# Patient Record
Sex: FEMALE | Race: WHITE | Employment: OTHER | URBAN - METROPOLITAN AREA
[De-identification: names, ages, dates, MRNs, and addresses within clinical notes are randomized per-mention and may not be internally consistent; named-entity substitution may affect disease eponyms.]

---

## 2018-01-17 ENCOUNTER — LAB ENCOUNTER (OUTPATIENT)
Dept: LAB | Facility: HOSPITAL | Age: 53
End: 2018-01-17
Attending: INTERNAL MEDICINE
Payer: COMMERCIAL

## 2018-01-17 DIAGNOSIS — I10 ESSENTIAL HYPERTENSION, MALIGNANT: Primary | ICD-10-CM

## 2018-01-17 LAB
ANION GAP SERPL CALC-SCNC: 8 MMOL/L (ref 0–18)
BUN SERPL-MCNC: 14 MG/DL (ref 8–20)
BUN/CREAT SERPL: 18.2 (ref 10–20)
CALCIUM SERPL-MCNC: 9.2 MG/DL (ref 8.5–10.5)
CHLORIDE SERPL-SCNC: 102 MMOL/L (ref 95–110)
CO2 SERPL-SCNC: 27 MMOL/L (ref 22–32)
CREAT SERPL-MCNC: 0.77 MG/DL (ref 0.5–1.5)
GLUCOSE SERPL-MCNC: 100 MG/DL (ref 70–99)
OSMOLALITY UR CALC.SUM OF ELEC: 285 MOSM/KG (ref 275–295)
POTASSIUM SERPL-SCNC: 4.2 MMOL/L (ref 3.3–5.1)
SODIUM SERPL-SCNC: 137 MMOL/L (ref 136–144)

## 2018-01-17 PROCEDURE — 36415 COLL VENOUS BLD VENIPUNCTURE: CPT

## 2018-01-17 PROCEDURE — 80048 BASIC METABOLIC PNL TOTAL CA: CPT

## 2018-06-21 ENCOUNTER — LAB ENCOUNTER (OUTPATIENT)
Dept: LAB | Facility: HOSPITAL | Age: 53
End: 2018-06-21
Attending: ANESTHESIOLOGY
Payer: COMMERCIAL

## 2018-06-21 DIAGNOSIS — E03.8 TSH (THYROID-STIMULATING HORMONE DEFICIENCY): Primary | ICD-10-CM

## 2018-06-21 DIAGNOSIS — I10 HIGH BLOOD PRESSURE: ICD-10-CM

## 2018-06-21 PROCEDURE — 80053 COMPREHEN METABOLIC PANEL: CPT

## 2018-06-21 PROCEDURE — 84480 ASSAY TRIIODOTHYRONINE (T3): CPT

## 2018-06-21 PROCEDURE — 84443 ASSAY THYROID STIM HORMONE: CPT

## 2018-06-21 PROCEDURE — 80061 LIPID PANEL: CPT

## 2018-07-26 RX ORDER — CLONIDINE HYDROCHLORIDE 0.1 MG/1
TABLET ORAL
Qty: 30 TABLET | Refills: 0 | Status: ON HOLD | OUTPATIENT
Start: 2018-07-26 | End: 2019-04-07

## 2018-10-04 RX ORDER — DILTIAZEM HYDROCHLORIDE 180 MG/1
CAPSULE, EXTENDED RELEASE ORAL
Qty: 60 CAPSULE | Refills: 3 | Status: ON HOLD | OUTPATIENT
Start: 2018-10-04 | End: 2021-05-02

## 2018-10-15 RX ORDER — PANCRELIPASE 24000; 76000; 120000 [USP'U]/1; [USP'U]/1; [USP'U]/1
CAPSULE, DELAYED RELEASE PELLETS ORAL
Refills: 2 | OUTPATIENT
Start: 2018-10-15

## 2018-10-15 NOTE — TELEPHONE ENCOUNTER
Pt has not seen Dr. Tanesha Neff since 2015. Will need to call office for apt before any refills can be given.

## 2018-10-18 ENCOUNTER — OFFICE VISIT (OUTPATIENT)
Dept: OPTOMETRY | Facility: CLINIC | Age: 53
End: 2018-10-18
Payer: COMMERCIAL

## 2018-10-18 DIAGNOSIS — H52.13 MYOPIA OF BOTH EYES: ICD-10-CM

## 2018-10-18 DIAGNOSIS — E11.9 CONTROLLED TYPE 2 DIABETES MELLITUS WITHOUT COMPLICATION, WITHOUT LONG-TERM CURRENT USE OF INSULIN (HCC): Primary | ICD-10-CM

## 2018-10-18 PROCEDURE — 92015 DETERMINE REFRACTIVE STATE: CPT | Performed by: OPTOMETRIST

## 2018-10-18 PROCEDURE — 92004 COMPRE OPH EXAM NEW PT 1/>: CPT | Performed by: OPTOMETRIST

## 2018-10-18 NOTE — ASSESSMENT & PLAN NOTE
New glasses and contact lens RX given today. Patient knows that she can come back if she feels her vision hs changed due to blood sugar fluctuation.

## 2018-10-18 NOTE — PROGRESS NOTES
Leopoldo Carrera is a 48year old female. HPI:     HPI     Diabetic Eye Exam     Diabetes characteristics include controlled with diet, Type 2 and taking oral medications. Duration of 4 years. Blood sugar level fluctuates.   Number of years diabetic 4 CAPSULES BY MOUTH IN THE MORNING  Disp: 60 capsule Rfl: 3   Glucose Blood In Vitro Strip by Misc. (Non-Drug; Combo Route) route. Pt test bs bid Disp:  Rfl:    Pancrelipase, Lip-Prot-Amyl, 29420 UNITS Oral Cap DR Particles Take by mouth.  Disp:  Rfl:    CloNI Normal          Keratometry       K1 K2    Right 46.25 45.25    Left 46.50 45.25            Slit Lamp and Fundus Exam     External Exam       Right Left    External Normal Normal          Slit Lamp Exam       Right Left    Lids/Lashes Normal Normal    Conj Prescriptions      No prescriptions requested or ordered in this encounter        Follow up instructions:  No Follow-up on file.     10/18/2018  Scribed by: Terell Joseph

## 2018-10-18 NOTE — PROGRESS NOTES
Shelley Reis is a 48year old female. HPI:     HPI     Diabetic Eye Exam     Diabetes characteristics include controlled with diet, Type 2 and taking oral medications. Duration of 4 years. Blood sugar level fluctuates.   Number of years diabetic 4 CAPSULES BY MOUTH IN THE MORNING  Disp: 60 capsule Rfl: 3   Glucose Blood In Vitro Strip by Misc. (Non-Drug; Combo Route) route. Pt test bs bid Disp:  Rfl:    Pancrelipase, Lip-Prot-Amyl, 12032 UNITS Oral Cap DR Particles Take by mouth.  Disp:  Rfl:    CloNI Normal          Keratometry       K1 K2    Right 46.25 45.25    Left 46.50 45.25            Slit Lamp and Fundus Exam     External Exam       Right Left    External Normal Normal          Slit Lamp Exam       Right Left    Lids/Lashes Normal Normal    Conj diabetes mellitus without complication, without long-term current use of insulin ([address]  I advised patient that there is no background diabetic retinopathy in either eye and that they should continue to keep their blood sugar under control and continue to s

## 2018-10-18 NOTE — PATIENT INSTRUCTIONS
Myopia  New glasses and contact lens RX given today. Patient knows that she can come back if she feels her vision hs changed due to blood sugar fluctuation.     Controlled type 2 diabetes mellitus without complication, without long-term current use of ins

## 2018-11-12 ENCOUNTER — LAB ENCOUNTER (OUTPATIENT)
Dept: LAB | Facility: HOSPITAL | Age: 53
End: 2018-11-12
Attending: ANESTHESIOLOGY
Payer: COMMERCIAL

## 2018-11-12 DIAGNOSIS — E11.9 DIABETES (HCC): Primary | ICD-10-CM

## 2018-11-12 PROCEDURE — 80061 LIPID PANEL: CPT

## 2018-11-12 PROCEDURE — 36415 COLL VENOUS BLD VENIPUNCTURE: CPT

## 2018-11-12 PROCEDURE — 83036 HEMOGLOBIN GLYCOSYLATED A1C: CPT

## 2018-11-12 PROCEDURE — 82947 ASSAY GLUCOSE BLOOD QUANT: CPT

## 2018-11-12 PROCEDURE — 80051 ELECTROLYTE PANEL: CPT

## 2018-12-26 ENCOUNTER — APPOINTMENT (OUTPATIENT)
Dept: GENERAL RADIOLOGY | Facility: HOSPITAL | Age: 53
End: 2018-12-26
Attending: EMERGENCY MEDICINE
Payer: COMMERCIAL

## 2018-12-26 ENCOUNTER — HOSPITAL ENCOUNTER (OUTPATIENT)
Facility: HOSPITAL | Age: 53
Setting detail: OBSERVATION
Discharge: HOME | End: 2018-12-27
Attending: EMERGENCY MEDICINE | Admitting: HOSPITALIST
Payer: COMMERCIAL

## 2018-12-26 DIAGNOSIS — R07.89 CHEST PAIN, ATYPICAL: Primary | ICD-10-CM

## 2018-12-26 PROCEDURE — 71046 X-RAY EXAM CHEST 2 VIEWS: CPT | Performed by: EMERGENCY MEDICINE

## 2018-12-26 PROCEDURE — 99219 INITIAL OBSERVATION CARE,LEVL II: CPT | Performed by: HOSPITALIST

## 2018-12-26 RX ORDER — OMEPRAZOLE 20 MG/1
20 CAPSULE, DELAYED RELEASE ORAL
COMMUNITY

## 2018-12-26 RX ORDER — ASPIRIN 81 MG/1
324 TABLET, CHEWABLE ORAL ONCE
Status: DISCONTINUED | OUTPATIENT
Start: 2018-12-26 | End: 2018-12-27

## 2018-12-26 RX ORDER — DULOXETIN HYDROCHLORIDE 60 MG/1
60 CAPSULE, DELAYED RELEASE ORAL NIGHTLY
COMMUNITY
End: 2020-08-05

## 2018-12-27 ENCOUNTER — APPOINTMENT (OUTPATIENT)
Dept: CV DIAGNOSTICS | Facility: HOSPITAL | Age: 53
End: 2018-12-27
Attending: HOSPITALIST
Payer: COMMERCIAL

## 2018-12-27 ENCOUNTER — APPOINTMENT (OUTPATIENT)
Dept: CV DIAGNOSTICS | Facility: HOSPITAL | Age: 53
End: 2018-12-27
Attending: INTERNAL MEDICINE
Payer: COMMERCIAL

## 2018-12-27 ENCOUNTER — APPOINTMENT (OUTPATIENT)
Dept: NUCLEAR MEDICINE | Facility: HOSPITAL | Age: 53
End: 2018-12-27
Attending: HOSPITALIST
Payer: COMMERCIAL

## 2018-12-27 ENCOUNTER — ANESTHESIA EVENT (OUTPATIENT)
Dept: ENDOSCOPY | Facility: HOSPITAL | Age: 53
End: 2018-12-27
Payer: COMMERCIAL

## 2018-12-27 ENCOUNTER — ANESTHESIA (OUTPATIENT)
Dept: ENDOSCOPY | Facility: HOSPITAL | Age: 53
End: 2018-12-27
Payer: COMMERCIAL

## 2018-12-27 VITALS
OXYGEN SATURATION: 96 % | RESPIRATION RATE: 16 BRPM | SYSTOLIC BLOOD PRESSURE: 126 MMHG | BODY MASS INDEX: 22.29 KG/M2 | TEMPERATURE: 98 F | WEIGHT: 142 LBS | DIASTOLIC BLOOD PRESSURE: 80 MMHG | HEART RATE: 60 BPM | HEIGHT: 67 IN

## 2018-12-27 PROCEDURE — 93306 TTE W/DOPPLER COMPLETE: CPT | Performed by: INTERNAL MEDICINE

## 2018-12-27 PROCEDURE — 0DB38ZX EXCISION OF LOWER ESOPHAGUS, VIA NATURAL OR ARTIFICIAL OPENING ENDOSCOPIC, DIAGNOSTIC: ICD-10-PCS | Performed by: INTERNAL MEDICINE

## 2018-12-27 PROCEDURE — 0DB68ZX EXCISION OF STOMACH, VIA NATURAL OR ARTIFICIAL OPENING ENDOSCOPIC, DIAGNOSTIC: ICD-10-PCS | Performed by: INTERNAL MEDICINE

## 2018-12-27 PROCEDURE — 0DB18ZX EXCISION OF UPPER ESOPHAGUS, VIA NATURAL OR ARTIFICIAL OPENING ENDOSCOPIC, DIAGNOSTIC: ICD-10-PCS | Performed by: INTERNAL MEDICINE

## 2018-12-27 PROCEDURE — 99217 OBSERVATION CARE DISCHARGE: CPT | Performed by: HOSPITALIST

## 2018-12-27 PROCEDURE — 93017 CV STRESS TEST TRACING ONLY: CPT | Performed by: HOSPITALIST

## 2018-12-27 PROCEDURE — 78452 HT MUSCLE IMAGE SPECT MULT: CPT | Performed by: HOSPITALIST

## 2018-12-27 PROCEDURE — 0DB98ZX EXCISION OF DUODENUM, VIA NATURAL OR ARTIFICIAL OPENING ENDOSCOPIC, DIAGNOSTIC: ICD-10-PCS | Performed by: INTERNAL MEDICINE

## 2018-12-27 RX ORDER — NALOXONE HYDROCHLORIDE 0.4 MG/ML
80 INJECTION, SOLUTION INTRAMUSCULAR; INTRAVENOUS; SUBCUTANEOUS AS NEEDED
Status: DISCONTINUED | OUTPATIENT
Start: 2018-12-27 | End: 2018-12-27 | Stop reason: HOSPADM

## 2018-12-27 RX ORDER — SODIUM CHLORIDE, SODIUM LACTATE, POTASSIUM CHLORIDE, CALCIUM CHLORIDE 600; 310; 30; 20 MG/100ML; MG/100ML; MG/100ML; MG/100ML
INJECTION, SOLUTION INTRAVENOUS CONTINUOUS
Status: DISCONTINUED | OUTPATIENT
Start: 2018-12-27 | End: 2018-12-27

## 2018-12-27 RX ORDER — MAGNESIUM HYDROXIDE/ALUMINUM HYDROXICE/SIMETHICONE 120; 1200; 1200 MG/30ML; MG/30ML; MG/30ML
30 SUSPENSION ORAL 4 TIMES DAILY PRN
Status: DISCONTINUED | OUTPATIENT
Start: 2018-12-27 | End: 2018-12-27

## 2018-12-27 RX ORDER — ACETAMINOPHEN 325 MG/1
650 TABLET ORAL EVERY 6 HOURS PRN
Status: DISCONTINUED | OUTPATIENT
Start: 2018-12-27 | End: 2018-12-27

## 2018-12-27 RX ORDER — HEPARIN SODIUM 5000 [USP'U]/ML
5000 INJECTION, SOLUTION INTRAVENOUS; SUBCUTANEOUS EVERY 12 HOURS
Status: DISCONTINUED | OUTPATIENT
Start: 2018-12-27 | End: 2018-12-27

## 2018-12-27 RX ORDER — LIDOCAINE HYDROCHLORIDE 10 MG/ML
INJECTION, SOLUTION EPIDURAL; INFILTRATION; INTRACAUDAL; PERINEURAL AS NEEDED
Status: DISCONTINUED | OUTPATIENT
Start: 2018-12-27 | End: 2018-12-27 | Stop reason: SURG

## 2018-12-27 RX ORDER — ONDANSETRON 2 MG/ML
4 INJECTION INTRAMUSCULAR; INTRAVENOUS EVERY 6 HOURS PRN
Status: DISCONTINUED | OUTPATIENT
Start: 2018-12-27 | End: 2018-12-27

## 2018-12-27 RX ORDER — NEBIVOLOL 5 MG/1
10 TABLET ORAL DAILY
Status: DISCONTINUED | OUTPATIENT
Start: 2018-12-27 | End: 2018-12-27

## 2018-12-27 RX ORDER — SODIUM CHLORIDE, SODIUM LACTATE, POTASSIUM CHLORIDE, CALCIUM CHLORIDE 600; 310; 30; 20 MG/100ML; MG/100ML; MG/100ML; MG/100ML
INJECTION, SOLUTION INTRAVENOUS CONTINUOUS PRN
Status: DISCONTINUED | OUTPATIENT
Start: 2018-12-27 | End: 2018-12-27 | Stop reason: SURG

## 2018-12-27 RX ORDER — PANTOPRAZOLE SODIUM 20 MG/1
20 TABLET, DELAYED RELEASE ORAL
Status: DISCONTINUED | OUTPATIENT
Start: 2018-12-27 | End: 2018-12-27

## 2018-12-27 RX ORDER — DIAZEPAM 2 MG/1
2 TABLET ORAL NIGHTLY PRN
Status: DISCONTINUED | OUTPATIENT
Start: 2018-12-27 | End: 2018-12-27

## 2018-12-27 RX ORDER — SODIUM CHLORIDE 9 MG/ML
INJECTION, SOLUTION INTRAVENOUS
Status: COMPLETED
Start: 2018-12-27 | End: 2018-12-27

## 2018-12-27 RX ORDER — DEXTROSE MONOHYDRATE 25 G/50ML
50 INJECTION, SOLUTION INTRAVENOUS
Status: DISCONTINUED | OUTPATIENT
Start: 2018-12-27 | End: 2018-12-27 | Stop reason: HOSPADM

## 2018-12-27 RX ORDER — HYDROCODONE BITARTRATE AND ACETAMINOPHEN 5; 325 MG/1; MG/1
1 TABLET ORAL EVERY 6 HOURS PRN
Status: DISCONTINUED | OUTPATIENT
Start: 2018-12-27 | End: 2018-12-27

## 2018-12-27 RX ORDER — DILTIAZEM HYDROCHLORIDE 180 MG/1
360 CAPSULE, COATED, EXTENDED RELEASE ORAL DAILY
Status: DISCONTINUED | OUTPATIENT
Start: 2018-12-27 | End: 2018-12-27

## 2018-12-27 RX ORDER — DULOXETIN HYDROCHLORIDE 30 MG/1
60 CAPSULE, DELAYED RELEASE ORAL NIGHTLY
Status: DISCONTINUED | OUTPATIENT
Start: 2018-12-27 | End: 2018-12-27

## 2018-12-27 RX ORDER — ONDANSETRON 2 MG/ML
INJECTION INTRAMUSCULAR; INTRAVENOUS AS NEEDED
Status: DISCONTINUED | OUTPATIENT
Start: 2018-12-27 | End: 2018-12-27 | Stop reason: SURG

## 2018-12-27 RX ORDER — CLONIDINE HYDROCHLORIDE 0.2 MG/1
0.2 TABLET ORAL 3 TIMES DAILY
Status: DISCONTINUED | OUTPATIENT
Start: 2018-12-27 | End: 2018-12-27

## 2018-12-27 RX ADMIN — SODIUM CHLORIDE, SODIUM LACTATE, POTASSIUM CHLORIDE, CALCIUM CHLORIDE: 600; 310; 30; 20 INJECTION, SOLUTION INTRAVENOUS at 14:38:00

## 2018-12-27 RX ADMIN — ONDANSETRON 4 MG: 2 INJECTION INTRAMUSCULAR; INTRAVENOUS at 14:47:00

## 2018-12-27 RX ADMIN — SODIUM CHLORIDE, SODIUM LACTATE, POTASSIUM CHLORIDE, CALCIUM CHLORIDE: 600; 310; 30; 20 INJECTION, SOLUTION INTRAVENOUS at 15:03:00

## 2018-12-27 RX ADMIN — LIDOCAINE HYDROCHLORIDE 80 MG: 10 INJECTION, SOLUTION EPIDURAL; INFILTRATION; INTRACAUDAL; PERINEURAL at 14:42:00

## 2018-12-27 NOTE — ANESTHESIA POSTPROCEDURE EVALUATION
Patient: Shine Perry    Procedure Summary     Date:  12/27/18 Room / Location:  [facility] ENDOSCOPY 03 / [facility]    Anesthesia Start:  5792 Anesthesia Stop:      Procedure:  ESOPHAGOGASTRODUODENOSCOPY (EGD) (N/A ) Diagnosis:  (Hiatal hernia, gastric

## 2018-12-27 NOTE — CONSULTS
Gastroenterology consultation note    Reason for consultation:  Noncardiac chest pain, epigastric pain, dysphagia/odynophagia      History of present illness:   The patient is a 48year old female who presents with a several week history of malaise, exercis bacteria per:NG   • Serratia 2005   • Stress fracture of hip 2013    hip pinning   • Type 1 diabetes mellitus (HCC)     Type 1-C           Iodinated Diagnosti*        Comment:Other reaction(s): IODINATED CONTRAST MEDIA - IV             DYE  Penicillins History    Socioeconomic History      Marital status:       Spouse name: Not on file      Number of children: Not on file      Years of education: Not on file      Highest education level: Not on file    Social Needs      Financial resource strain: HPI.        Physical examination:  GEN:  Pleasant well-developed well-nourished female who is in no acute distress. HEENT: Negative for scleral icterus. Conjunctivae are pink.   Neck: Supple without adenopathy or thyromegaly  Chest: Clear to auscultation Biopsies of each area will be obtained. I will also obtain an ESR, CRP and LDH in light of the previous history of lymphoma.   If the endoscopy is negative and the patient's symptoms remain unexplained, a CT scan of the chest, abdomen and pelvis would be

## 2018-12-27 NOTE — H&P
[address] [place] Patient Status:  Emergency    1965 MRN [id number]   Location [address] Attending Sender, Kiko Theodore MD   Hosp Day # 0 PCP Leonela Stockton MD     Date:   Maternal Grandmother    • Genetic Disease Other 5        Neurofibromatosis   • Diabetes Neg       reports that  has never smoked. she has never used smokeless tobacco. She reports that she does not drink alcohol.     Allergies:    Iodinated Diagnosti* Alert and oriented. Diffuse skin problem:  None. Eye:  Pupils are equal, round and reactive to light, extraocular movements are intact, Normal conjunctiva. HENT:  Normocephalic, oral mucosa is moist.  Head:  Normocephalic, atraumatic.   Neck:  Supple, no

## 2018-12-27 NOTE — PLAN OF CARE
Problem: Diabetes/Glucose Control  Goal: Glucose maintained within prescribed range  INTERVENTIONS:  - Monitor Blood Glucose as ordered  - Assess for signs and symptoms of hyperglycemia and hypoglycemia  - Administer ordered medications to maintain glucose effectiveness of antiarrhythmic and heart rate control medications as ordered  - Initiate emergency measures for life threatening arrhythmias  - Monitor electrolytes and administer replacement therapy as ordered  Outcome: Progressing      Problem: METABOLI

## 2018-12-27 NOTE — HISTORICAL OFFICE NOTE
Flori Esparza  : 1965  ACCOUNT:  [id number]  [id number]  PCP: Dr. Gucci Castaneda     TODAY'S DATE: 2018  DICTATED BY:  Sabina Tomlinson MD]      CHIEF COMPLAINT: [Followup of Hypertension.]    HPI:    [On 2018, Anahi Machuca, a 46 year normal rate and rhythm, clear to auscultation. GI: no masses, tenderness or hepatosplenomegaly, rectal deferred. MS: adequate gait for exercise/testing. EXT: no clubbing or cyanosis. SKIN: no rashes, lesions, ulcers.   NEURO/PSYCH: alert and oriented to ti 08/25/1965 02/05/18 11:10:23  ACCOUNT:  [id number]  HOME PHONE:  [phone]  WORK PHONE:     Spoke with patient's  on her behalf. She went back on the clonidine 3 times a day.   She takes this in part because of neuropathic pain from previous cance cm/s with a renal-aortic ratio of 1.1. Renal resistive indices are normal and average less than 0.70. On the left, the kidney is 11.2 cm. There is a widely patent renal artery with a maximum velocity of 70/26 cm/s and a renal-aortic ratio of 0.8.

## 2018-12-27 NOTE — OPERATIVE REPORT
[facility] Endoscopy Report      Date of Procedure:  12/27/18        Preoperative Diagnosis:  1. Cardiac chest pain  2. Epigastric pain  3. Dysphagia/odynophagia      Postoperative Diagnosis:  1.  2 cm hiatal hernia  2.   Gastric fundic g surgical pathology. The duodenal bulb and post bulbar regions were normal.  Biopsies of the second portion of the duodenum were obtained. Impression:  1.  2 cm hiatal hernia  2. Gastric fundic gland polyps    Recommendations:  1.   Follow-up biopsy r

## 2018-12-27 NOTE — H&P
History & Physical Examination    Patient Name: Jai Price  MRN: [id number]  [facility]: [id number]  YOB: 1965    Diagnosis: Non cardiac chest pain, epigastric pain, dysphagia        Medications Prior to Admission:  DULoxetine HCl 60 MG Oral 0.2 mg Oral TID   ondansetron HCl (ZOFRAN) injection 4 mg 4 mg Intravenous Q6H PRN   acetaminophen (TYLENOL) tab 650 mg 650 mg Oral Q6H PRN   Heparin Sodium (Porcine) 5000 UNIT/ML injection 5,000 Units 5,000 Units Subcutaneous Q12H   HYDROcodone-acetaminop Breast Cancer Maternal Grandmother    • Genetic Disease Other 5        Neurofibromatosis   • Diabetes Neg      Social History    Tobacco Use      Smoking status: Never Smoker      Smokeless tobacco: Never Used    Alcohol use: No      SYSTEM Check if Review

## 2018-12-27 NOTE — ED INITIAL ASSESSMENT (HPI)
Chest pressure that radiates to her throat x 1 week intermittently. Pt states last 2 days have been worse. Pt denies sob.

## 2018-12-27 NOTE — ANESTHESIA PREPROCEDURE EVALUATION
Anesthesia PreOp Note    HPI:     Nithya Melara is a 48year old female who presents for preoperative consultation requested by: Yecenia Steele MD    Date of Surgery: 12/26/2018 - 12/27/2018    Procedure(s):  ESOPHAGOGASTRODUODENOSCOPY (EGD)  In mouth nightly as needed. Disp:  Rfl: 2 Taking   CREON 16166 UNITS Oral Cap DR Particles TAKE 3 CAPSULES BY MOUTH 3 (THREE) TIMES DAILY WITH MEALS.  Disp: 810 capsule Rfl: 3 Taking   CloNIDine HCl 0.1 MG Oral Tab DAILY IN THE AFTERNOON Disp: 30 tablet Rfl: Oral Q6H PRN Brice Finch MD    Alum & Mag Hydroxide-Simeth (MAALOX) oral suspension 30 mL 30 mL Oral QID PRN Brice Finch MD    Pancrelipase (Lip-Prot-Amyl) (CREON) 51025-50141 units DR- PATIENT'S OWN SUPPLY  72,000 units of lipase Oral TID CC Pot Exercise: Not Asked        Bike Helmet: Not Asked        Seat Belt: Not Asked        Self-Exams: Not Asked        Grew up on a farm: Not Asked        History of tanning: Not Asked        Outdoor occupation: Not Asked        Pt has a pacemaker: No Anesthesia Plan:   ASA:  2  Plan:   MAC  Informed Consent Plan and Risks Discussed With:  Patient  Discussed plan with:  Surgeon      I have informed Arely Guard and/or legal guardian or family member of the nature of the anesthetic plan, benefi

## 2018-12-27 NOTE — ED PROVIDER NOTES
Patient Seen in: [facility] Emergency Department    History   Patient presents with:  Chest Pain Angina (cardiovascular)    Stated Complaint: Chest pain for a week    HPI    Patient is a 59-year-old female who presents to the emergency department as noted above.     Physical Exam     ED Triage Vitals [12/26/18 1908]   /83   Pulse 52   Resp 18   Temp 99.3 °F (37.4 °C)   Temp src Oral   SpO2 99 %   O2 Device None (Room air)       Current:/83   Pulse (!) 47   Temp 99.3 °F (37.4 °C) (Oral) GREEN   RAINBOW DRAW LIGHT GREEN   RAINBOW DRAW GOLD   RAINBOW DRAW LAVENDER TALL (BNP)   CBC W/ DIFFERENTIAL     EKG    Rate, intervals and axes as noted on EKG Report.   Rate: 49  Rhythm: Sinus Rhythm  Reading: No acute changes                    MDM   Si

## 2018-12-27 NOTE — CONSULTS
[facility] HOSP - [facility]    Report of Cardiology Consultation    Henry Ayala Patient Status:  Emergency    1965 MRN [id number]   Location [address] Attending Sender, Sammy Galvez MD   Hosp Day # 0 PCP María Temple MD Family History  Family History   Problem Relation Age of Onset   • Glaucoma Mother    • Hypertension Mother    • Heart Disease Father         CAD   • Hypertension Father    • Other (Other) Father         polymyalgia rheumatic   • Breast Cancer Matern CREATSERUM 0.90 12/26/2018    BUN 15 12/26/2018     12/26/2018    K 3.7 12/26/2018    CL 99 12/26/2018    CO2 28 12/26/2018     (H) 12/26/2018    CA 9.3 12/26/2018    ALB 4.0 06/21/2018    ALKPHO 93 06/21/2018    TP 7.4 06/21/2018    AST 22 06

## 2018-12-27 NOTE — PROGRESS NOTES
[facility]    Progress Note    [facility] Patient Status:  Observation    1965 MRN [id number]   Location [facility] 3W/SW Attending Jevon Ruff MD   Hosp Day # 0 PCP Arvin Gutierrez MD       Assessment and Plan: effort  CV: Heart with regular rate and rhythm, nl S1,S2 ,no murmur  Abd: Abdomen soft, nontender, nondistended,   Ext:  no clubbing, no cyanosis,no edema  Neuro: no focal deficits  Skin: no rashes or lesions    Scheduled Meds:   • Nebivolol HCl  10 mg Tenna Job

## 2018-12-28 NOTE — PLAN OF CARE
- pt leaving for echo now  - ok for DC after echo is completed  - will f/u on results tomorrow 12/29/18  - plan d/w Dr. Roopa Chacon and Dr. Jose Schultz  - d/w patient        COSTA Iverson  S Cardiology  12/27/18

## 2018-12-28 NOTE — TRANSITION NOTE
For Cardiology Office: This is a patient of Dr. Phyllis Horne. She was seen by Dr. Pao Butcher on 12/26 for chest pain. She has a history of hypertension. Troponins were negative. Nuclear stress test showed normal perfusion and an ejection fraction of 84%.   An e CREON      Take by mouth. Refills:  0     CREON 10739-85083 units Cpep  Generic drug:  Pancrelipase (Lip-Prot-Amyl)      TAKE 3 CAPSULES BY MOUTH 3 (THREE) TIMES DAILY WITH MEALS.    Quantity:  810 capsule  Refills:  3     diazepam 2 MG Tabs  Commonly kno

## 2019-01-07 ENCOUNTER — MED REC SCAN ONLY (OUTPATIENT)
Dept: GASTROENTEROLOGY | Facility: CLINIC | Age: 54
End: 2019-01-07

## 2019-01-07 NOTE — DISCHARGE SUMMARY
[facility] HOSPITALIST  DISCHARGE SUMMARY     Silvina Carlos Patient Status:  Observation    1965 MRN [id number]   [facility] 3W/ Attending No att. providers found   Hosp Day # 0 PCP Enedina Cabrera MD     Date of Admission:  Her total cholesterol was 254 triglycerides 104, HDL 58 and LDL was 175. She states that she has been on a statin in the past and it caused muscle aches. Defer to cardiology/follow up as outpatient. She also has diabetes and a hemoglobin A1c of 6.2.  Cristal 30 tablet  Refills:  0     Pancrelipase (Lip-Prot-Amyl) 35104-34791 units Cpep  Commonly known as:  CREON      Take by mouth.    Refills:  0     CREON 73798-54657 units Cpep  Generic drug:  Pancrelipase (Lip-Prot-Amyl)      TAKE 3 CAPSULES BY MOUTH 3 (THRE Risk  0-28   Low Risk. TCM Follow-Up Recommendation:  LACE 29-58:  Moderate Risk of readmission after discharge from the hospital.        Philip Joseph MD 1/7/2019    Time spent:  > 30 minutes

## 2019-03-02 ENCOUNTER — LAB ENCOUNTER (OUTPATIENT)
Dept: LAB | Facility: HOSPITAL | Age: 54
End: 2019-03-02
Attending: INTERNAL MEDICINE
Payer: COMMERCIAL

## 2019-03-02 DIAGNOSIS — E10.9 DIABETES MELLITUS TYPE I (HCC): Primary | ICD-10-CM

## 2019-03-02 LAB
EST. AVERAGE GLUCOSE BLD GHB EST-MCNC: 137 MG/DL (ref 68–126)
GLUCOSE BLD-MCNC: 115 MG/DL (ref 70–99)
HBA1C MFR BLD HPLC: 6.4 % (ref ?–5.7)

## 2019-03-02 PROCEDURE — 83036 HEMOGLOBIN GLYCOSYLATED A1C: CPT

## 2019-03-02 PROCEDURE — 82947 ASSAY GLUCOSE BLOOD QUANT: CPT

## 2019-03-02 PROCEDURE — 36415 COLL VENOUS BLD VENIPUNCTURE: CPT

## 2019-03-02 PROCEDURE — 84681 ASSAY OF C-PEPTIDE: CPT

## 2019-03-04 LAB — C-PEPTIDE, SERUM OR PLASMA: 1.6 NG/ML

## 2019-04-05 ENCOUNTER — HOSPITAL ENCOUNTER (EMERGENCY)
Facility: HOSPITAL | Age: 54
Discharge: STILL ADMITTED OR ADMINISTRATIVE | End: 2019-04-06
Payer: COMMERCIAL

## 2019-04-07 ENCOUNTER — APPOINTMENT (OUTPATIENT)
Dept: NUCLEAR MEDICINE | Facility: HOSPITAL | Age: 54
End: 2019-04-07
Attending: EMERGENCY MEDICINE
Payer: COMMERCIAL

## 2019-04-07 ENCOUNTER — HOSPITAL ENCOUNTER (OUTPATIENT)
Facility: HOSPITAL | Age: 54
Setting detail: OBSERVATION
Discharge: HOME | End: 2019-04-09
Attending: EMERGENCY MEDICINE
Payer: COMMERCIAL

## 2019-04-07 ENCOUNTER — APPOINTMENT (OUTPATIENT)
Dept: GENERAL RADIOLOGY | Facility: HOSPITAL | Age: 54
End: 2019-04-07
Attending: EMERGENCY MEDICINE
Payer: COMMERCIAL

## 2019-04-07 DIAGNOSIS — R07.9 ACUTE CHEST PAIN: ICD-10-CM

## 2019-04-07 DIAGNOSIS — R23.0 PERIORAL CYANOSIS: ICD-10-CM

## 2019-04-07 DIAGNOSIS — R06.00 DYSPNEA ON EXERTION: Primary | ICD-10-CM

## 2019-04-07 PROBLEM — R06.09 DYSPNEA ON EXERTION: Status: ACTIVE | Noted: 2019-04-07

## 2019-04-07 PROBLEM — E87.3 METABOLIC ALKALOSIS: Status: ACTIVE | Noted: 2019-04-07

## 2019-04-07 PROBLEM — E87.6 HYPOKALEMIA: Status: ACTIVE | Noted: 2019-04-07

## 2019-04-07 PROCEDURE — 99220 INITIAL OBSERVATION CARE,LEVL III: CPT | Performed by: HOSPITALIST

## 2019-04-07 PROCEDURE — 99214 OFFICE O/P EST MOD 30 MIN: CPT | Performed by: INTERNAL MEDICINE

## 2019-04-07 PROCEDURE — 78582 LUNG VENTILAT&PERFUS IMAGING: CPT | Performed by: EMERGENCY MEDICINE

## 2019-04-07 PROCEDURE — 71045 X-RAY EXAM CHEST 1 VIEW: CPT | Performed by: EMERGENCY MEDICINE

## 2019-04-07 RX ORDER — OLMESARTAN MEDOXOMIL 40 MG/1
40 TABLET ORAL DAILY
COMMUNITY
End: 2019-10-08

## 2019-04-07 RX ORDER — LOSARTAN POTASSIUM 100 MG/1
100 TABLET ORAL DAILY
Status: DISCONTINUED | OUTPATIENT
Start: 2019-04-07 | End: 2019-04-09

## 2019-04-07 RX ORDER — METOPROLOL TARTRATE 50 MG/1
100 TABLET, FILM COATED ORAL ONCE
Status: COMPLETED | OUTPATIENT
Start: 2019-04-08 | End: 2019-04-08

## 2019-04-07 RX ORDER — EZETIMIBE 10 MG/1
10 TABLET ORAL DAILY
COMMUNITY
End: 2019-12-16 | Stop reason: ALTCHOICE

## 2019-04-07 RX ORDER — METOCLOPRAMIDE HYDROCHLORIDE 5 MG/ML
10 INJECTION INTRAMUSCULAR; INTRAVENOUS EVERY 8 HOURS PRN
Status: DISCONTINUED | OUTPATIENT
Start: 2019-04-07 | End: 2019-04-09

## 2019-04-07 RX ORDER — SODIUM CHLORIDE 0.9 % (FLUSH) 0.9 %
3 SYRINGE (ML) INJECTION AS NEEDED
Status: DISCONTINUED | OUTPATIENT
Start: 2019-04-07 | End: 2019-04-09

## 2019-04-07 RX ORDER — METOPROLOL TARTRATE 50 MG/1
100 TABLET, FILM COATED ORAL ONCE AS NEEDED
Status: DISCONTINUED | OUTPATIENT
Start: 2019-04-07 | End: 2019-04-09

## 2019-04-07 RX ORDER — ASPIRIN 81 MG/1
324 TABLET, CHEWABLE ORAL ONCE
Status: COMPLETED | OUTPATIENT
Start: 2019-04-07 | End: 2019-04-07

## 2019-04-07 RX ORDER — METOPROLOL TARTRATE 50 MG/1
100 TABLET, FILM COATED ORAL ONCE
Status: COMPLETED | OUTPATIENT
Start: 2019-04-07 | End: 2019-04-07

## 2019-04-07 RX ORDER — PANTOPRAZOLE SODIUM 40 MG/1
40 TABLET, DELAYED RELEASE ORAL
Status: DISCONTINUED | OUTPATIENT
Start: 2019-04-08 | End: 2019-04-09

## 2019-04-07 RX ORDER — METOPROLOL TARTRATE 50 MG/1
50 TABLET, FILM COATED ORAL ONCE AS NEEDED
Status: ACTIVE | OUTPATIENT
Start: 2019-04-07 | End: 2019-04-07

## 2019-04-07 RX ORDER — METOPROLOL TARTRATE 50 MG/1
50 TABLET, FILM COATED ORAL ONCE AS NEEDED
Status: DISCONTINUED | OUTPATIENT
Start: 2019-04-07 | End: 2019-04-09

## 2019-04-07 RX ORDER — DEXTROSE MONOHYDRATE 25 G/50ML
50 INJECTION, SOLUTION INTRAVENOUS AS NEEDED
Status: DISCONTINUED | OUTPATIENT
Start: 2019-04-07 | End: 2019-04-07

## 2019-04-07 RX ORDER — METOPROLOL TARTRATE 50 MG/1
50 TABLET, FILM COATED ORAL ONCE AS NEEDED
Status: COMPLETED | OUTPATIENT
Start: 2019-04-08 | End: 2019-04-08

## 2019-04-07 RX ORDER — DILTIAZEM HYDROCHLORIDE 180 MG/1
360 CAPSULE, EXTENDED RELEASE ORAL DAILY
Status: DISCONTINUED | OUTPATIENT
Start: 2019-04-08 | End: 2019-04-09

## 2019-04-07 RX ORDER — NITROGLYCERIN 0.4 MG/1
0.4 TABLET SUBLINGUAL EVERY 5 MIN PRN
Status: DISCONTINUED | OUTPATIENT
Start: 2019-04-07 | End: 2019-04-07

## 2019-04-07 RX ORDER — HEPARIN SODIUM 5000 [USP'U]/ML
5000 INJECTION, SOLUTION INTRAVENOUS; SUBCUTANEOUS EVERY 12 HOURS SCHEDULED
Status: DISCONTINUED | OUTPATIENT
Start: 2019-04-07 | End: 2019-04-09

## 2019-04-07 RX ORDER — INDAPAMIDE 1.25 MG/1
1.25 TABLET, FILM COATED ORAL DAILY
Status: DISCONTINUED | OUTPATIENT
Start: 2019-04-07 | End: 2019-04-07

## 2019-04-07 RX ORDER — METOPROLOL TARTRATE 50 MG/1
50 TABLET, FILM COATED ORAL ONCE
Status: COMPLETED | OUTPATIENT
Start: 2019-04-08 | End: 2019-04-08

## 2019-04-07 RX ORDER — POTASSIUM CHLORIDE 20 MEQ/1
40 TABLET, EXTENDED RELEASE ORAL ONCE
Status: COMPLETED | OUTPATIENT
Start: 2019-04-07 | End: 2019-04-07

## 2019-04-07 RX ORDER — DEXTROSE MONOHYDRATE 25 G/50ML
50 INJECTION, SOLUTION INTRAVENOUS AS NEEDED
Status: DISCONTINUED | OUTPATIENT
Start: 2019-04-07 | End: 2019-04-09

## 2019-04-07 RX ORDER — DIPHENHYDRAMINE HCL 50 MG
50 CAPSULE ORAL ONCE
Status: COMPLETED | OUTPATIENT
Start: 2019-04-07 | End: 2019-04-08

## 2019-04-07 RX ORDER — INSULIN ASPART INJECTION 100 [IU]/ML
INJECTION, SOLUTION SUBCUTANEOUS
COMMUNITY
End: 2022-01-26

## 2019-04-07 RX ORDER — NITROGLYCERIN 0.4 MG/1
0.4 TABLET SUBLINGUAL EVERY 5 MIN PRN
Status: DISCONTINUED | OUTPATIENT
Start: 2019-04-07 | End: 2019-04-09

## 2019-04-07 RX ORDER — AMLODIPINE BESYLATE 5 MG/1
5 TABLET ORAL NIGHTLY
Status: DISCONTINUED | OUTPATIENT
Start: 2019-04-07 | End: 2019-04-09

## 2019-04-07 RX ORDER — DILTIAZEM HYDROCHLORIDE 5 MG/ML
10 INJECTION INTRAVENOUS SEE ADMIN INSTRUCTIONS
Status: DISCONTINUED | OUTPATIENT
Start: 2019-04-07 | End: 2019-04-09

## 2019-04-07 RX ORDER — PREDNISONE 50 MG/1
50 TABLET ORAL EVERY 6 HOURS
Status: COMPLETED | OUTPATIENT
Start: 2019-04-07 | End: 2019-04-08

## 2019-04-07 RX ORDER — EZETIMIBE 10 MG/1
10 TABLET ORAL DAILY
Status: DISCONTINUED | OUTPATIENT
Start: 2019-04-07 | End: 2019-04-09

## 2019-04-07 RX ORDER — NITROGLYCERIN 0.4 MG/1
0.4 TABLET SUBLINGUAL ONCE
Status: COMPLETED | OUTPATIENT
Start: 2019-04-07 | End: 2019-04-08

## 2019-04-07 RX ORDER — ALPRAZOLAM 0.25 MG/1
0.25 TABLET ORAL ONCE AS NEEDED
Status: COMPLETED | OUTPATIENT
Start: 2019-04-08 | End: 2019-04-08

## 2019-04-07 RX ORDER — POTASSIUM CHLORIDE 20 MEQ/1
40 TABLET, EXTENDED RELEASE ORAL EVERY 4 HOURS
Status: DISPENSED | OUTPATIENT
Start: 2019-04-07 | End: 2019-04-08

## 2019-04-07 RX ORDER — ROSUVASTATIN CALCIUM 5 MG/1
5 TABLET, COATED ORAL NIGHTLY
Status: DISCONTINUED | OUTPATIENT
Start: 2019-04-07 | End: 2019-04-09

## 2019-04-07 RX ORDER — ACETAMINOPHEN 325 MG/1
650 TABLET ORAL EVERY 6 HOURS PRN
Status: DISCONTINUED | OUTPATIENT
Start: 2019-04-07 | End: 2019-04-09

## 2019-04-07 RX ORDER — ROSUVASTATIN CALCIUM 5 MG/1
5 TABLET, COATED ORAL NIGHTLY
COMMUNITY
End: 2019-07-11

## 2019-04-07 RX ORDER — METOPROLOL TARTRATE 5 MG/5ML
5 INJECTION INTRAVENOUS SEE ADMIN INSTRUCTIONS
Status: DISCONTINUED | OUTPATIENT
Start: 2019-04-07 | End: 2019-04-09

## 2019-04-07 RX ORDER — INDAPAMIDE 2.5 MG/1
2.5 TABLET, FILM COATED ORAL DAILY
Status: DISCONTINUED | OUTPATIENT
Start: 2019-04-08 | End: 2019-04-07

## 2019-04-07 RX ORDER — DULOXETIN HYDROCHLORIDE 30 MG/1
60 CAPSULE, DELAYED RELEASE ORAL NIGHTLY
Status: DISCONTINUED | OUTPATIENT
Start: 2019-04-07 | End: 2019-04-09

## 2019-04-07 RX ORDER — INDAPAMIDE 1.25 MG/1
1.25 TABLET, FILM COATED ORAL DAILY
COMMUNITY
End: 2019-12-16 | Stop reason: ALTCHOICE

## 2019-04-07 RX ORDER — AMLODIPINE BESYLATE 5 MG/1
5 TABLET ORAL NIGHTLY
COMMUNITY
End: 2019-10-08

## 2019-04-07 RX ORDER — DIAZEPAM 2 MG/1
2 TABLET ORAL NIGHTLY PRN
Status: DISCONTINUED | OUTPATIENT
Start: 2019-04-07 | End: 2019-04-09

## 2019-04-07 RX ORDER — ASPIRIN 325 MG
325 TABLET ORAL DAILY
Status: DISCONTINUED | OUTPATIENT
Start: 2019-04-07 | End: 2019-04-09

## 2019-04-07 RX ORDER — METOPROLOL TARTRATE 50 MG/1
100 TABLET, FILM COATED ORAL ONCE AS NEEDED
Status: ACTIVE | OUTPATIENT
Start: 2019-04-07 | End: 2019-04-07

## 2019-04-07 RX ORDER — ONDANSETRON 2 MG/ML
4 INJECTION INTRAMUSCULAR; INTRAVENOUS EVERY 6 HOURS PRN
Status: DISCONTINUED | OUTPATIENT
Start: 2019-04-07 | End: 2019-04-09

## 2019-04-07 RX ORDER — SODIUM CHLORIDE 9 MG/ML
INJECTION, SOLUTION INTRAVENOUS CONTINUOUS
Status: DISCONTINUED | OUTPATIENT
Start: 2019-04-07 | End: 2019-04-09

## 2019-04-07 RX ORDER — DILTIAZEM HYDROCHLORIDE 180 MG/1
180 CAPSULE, EXTENDED RELEASE ORAL DAILY
Status: DISCONTINUED | OUTPATIENT
Start: 2019-04-07 | End: 2019-04-07

## 2019-04-07 RX ORDER — METOPROLOL TARTRATE 50 MG/1
50 TABLET, FILM COATED ORAL ONCE
Status: COMPLETED | OUTPATIENT
Start: 2019-04-07 | End: 2019-04-07

## 2019-04-07 NOTE — ED PROVIDER NOTES
Patient Seen in: [facility] AND [facility] Emergency Department    History   Patient presents with:  Dyspnea ADRIAN SOB (respiratory)    Stated Complaint:     HPI    Patient presents emergency department complaining of shortness of breath.   She states that she has signs reviewed. All other systems reviewed and negative except as noted above.     Physical Exam     ED Triage Vitals [04/07/19 1155]   /84   Pulse 98   Resp 18   Temp    Temp src    SpO2 100 %   O2 Device None (Room air)       Current:/79 Monocyte Absolute 1.08 (*)     All other components within normal limits   TROPONIN I - Normal   CK CREATINE KINASE (NOT CREATININE) - Normal   CBC WITH DIFFERENTIAL WITH PLATELET    Narrative:      The following orders were created for panel order CBC WITH Disposition and Plan     Clinical Impression:  Dyspnea on exertion  (primary encounter diagnosis)  Perioral cyanosis  Acute chest pain    Disposition:  Admit  4/7/2019  2:21 pm    Follow-up:  No follow-up provider specified.       Medications Pr

## 2019-04-07 NOTE — CONSULTS
[facility]  Cardiology Consultation    Mindy Nicholas Patient Status:  Emergency    1965 MRN [id number]   Location [address] Attending Tenisha Singh MD   Hosp Day # 0 PCP Marcelino Meade MD     Reason for Con 12/27/2018    Performed by Logan Alvarado MD at [facility]   • HIP SURGERY  2013    hip pinning for stress fracture     Family History   Problem Relation Age of Onset   • Glaucoma Mother    • Hypertension Mother    • Heart Disease Father 04/07/2019    HCT 40.3 04/07/2019    .0 04/07/2019    CREATSERUM 1.13 04/07/2019    BUN 17 04/07/2019     04/07/2019    K 3.0 04/07/2019    CL 97 04/07/2019    CO2 33.0 04/07/2019    GLU 96 04/07/2019    CA 9.4 04/07/2019    TROP <0.045 04/07/

## 2019-04-07 NOTE — H&P
[address] [place] Patient Status:  Observation    1965 MRN [id number]   Location [facility]5W Attending Gracie Omer MD   Hosp Day # 0 PCP Pool Fatima MD     Date:    Date of Breast Cancer Maternal Grandmother    • Genetic Disease Other 5        Neurofibromatosis   • Diabetes Neg      Social History:  Social History    Tobacco Use      Smoking status: Never Smoker      Smokeless tobacco: Never Used    Alcohol use: No    Drug us pulse 83, temperature 98.4 °F (36.9 °C), temperature source Oral, resp. rate 18, height 5' 7\" (1.702 m), weight 149 lb (67.6 kg), SpO2 99 %.      Gen: No acute distress, alert and oriented x3  Pulm: Lungs clear, normal respiratory effort  CV: Heart with re --------------------------       Assessment/Plan:     Chest pain and  Dyspnea on exertion  - consult with cardiology and pulmonology  - plan for CTA in the morning  - prednisone protocol prior to CTA due to contrast allergy  - ABG reviewed   - echo with efra

## 2019-04-07 NOTE — CONSULTS
[facility] HOSP - [facility]    Report of Consultation    [facility] Patient Status:  Observation    1965 MRN [id number]   Location [facility]5W Attending Natalie Ulrich MD   Hosp Day # 0 PCP Shaka Velazco MD     Date of Admission:  4 SURGERY  2013    hip pinning for stress fracture       Family History  Family History   Problem Relation Age of Onset   • Glaucoma Mother    • Hypertension Mother    • Heart Disease Father         CAD   • Hypertension Father    • Other (Other) Father injection 5 mg 5 mg Intravenous See Admin Instructions   Metoprolol Tartrate (LOPRESSOR) tab 50 mg 50 mg Oral Once   Or      Metoprolol Tartrate (LOPRESSOR) tab 100 mg 100 mg Oral Once   [START ON 4/8/2019] Metoprolol Tartrate (LOPRESSOR) tab 50 mg 50 mg O Medoxomil 40 MG Oral Tab Take 40 mg by mouth daily. Take after breakfast   ezetimibe 10 MG Oral Tab Take 10 mg by mouth daily. Empagliflozin (JARDIANCE) 25 MG Oral Tab Take 1 tablet by mouth daily.    amLODIPine Besylate 5 MG Oral Tab Take 5 mg by mouth n Laboratory Data:  Lab Results   Component Value Date    WBC 12.9 (H) 04/07/2019    HGB 13.6 04/07/2019    HCT 40.3 04/07/2019    .0 (H) 04/07/2019    CREATSERUM 1.13 (H) 04/07/2019    BUN 17 04/07/2019     04/07/2019    K 3.0 (L) 04/07/201 participate in the care of your patient. Cathi Sloan.  Susan  4/7/2019

## 2019-04-08 ENCOUNTER — APPOINTMENT (OUTPATIENT)
Dept: CT IMAGING | Facility: HOSPITAL | Age: 54
End: 2019-04-08
Attending: INTERNAL MEDICINE
Payer: COMMERCIAL

## 2019-04-08 ENCOUNTER — APPOINTMENT (OUTPATIENT)
Dept: CV DIAGNOSTICS | Facility: HOSPITAL | Age: 54
End: 2019-04-08
Attending: INTERNAL MEDICINE
Payer: COMMERCIAL

## 2019-04-08 PROCEDURE — 93306 TTE W/DOPPLER COMPLETE: CPT | Performed by: INTERNAL MEDICINE

## 2019-04-08 PROCEDURE — 99226 SUBSEQUENT OBSERVATION CARE: CPT | Performed by: HOSPITALIST

## 2019-04-08 PROCEDURE — 99243 OFF/OP CNSLTJ NEW/EST LOW 30: CPT | Performed by: INTERNAL MEDICINE

## 2019-04-08 PROCEDURE — 75574 CT ANGIO HRT W/3D IMAGE: CPT | Performed by: INTERNAL MEDICINE

## 2019-04-08 PROCEDURE — 99214 OFFICE O/P EST MOD 30 MIN: CPT | Performed by: INTERNAL MEDICINE

## 2019-04-08 RX ORDER — DIPHENHYDRAMINE HCL 25 MG
CAPSULE ORAL
Status: DISPENSED
Start: 2019-04-08 | End: 2019-04-08

## 2019-04-08 RX ORDER — DIPHENHYDRAMINE HCL 25 MG
25 CAPSULE ORAL ONCE
Status: COMPLETED | OUTPATIENT
Start: 2019-04-08 | End: 2019-04-08

## 2019-04-08 RX ORDER — MAGNESIUM HYDROXIDE/ALUMINUM HYDROXICE/SIMETHICONE 120; 1200; 1200 MG/30ML; MG/30ML; MG/30ML
30 SUSPENSION ORAL 4 TIMES DAILY PRN
Status: DISCONTINUED | OUTPATIENT
Start: 2019-04-08 | End: 2019-04-09

## 2019-04-08 RX ORDER — ALPRAZOLAM 0.25 MG/1
TABLET ORAL
Status: DISPENSED
Start: 2019-04-08 | End: 2019-04-08

## 2019-04-08 RX ORDER — DILTIAZEM HYDROCHLORIDE 5 MG/ML
INJECTION INTRAVENOUS
Status: DISPENSED
Start: 2019-04-08 | End: 2019-04-08

## 2019-04-08 RX ORDER — METOPROLOL TARTRATE 5 MG/5ML
INJECTION INTRAVENOUS
Status: DISPENSED
Start: 2019-04-08 | End: 2019-04-08

## 2019-04-08 NOTE — IMAGING NOTE
TO RAD HOLDING AT 0815  HX TAKEN PT CONSENTED 4/7/19 VS /66 HR 69    18 GAUGE IV STARTED AT ON UNIT LEFT HAND POC TESTING COMPLETED GFR = 58 CREATINE = 1.09    CTA ORDERED BY  DR REA WAS PT GIVEN CTA  PREMEDS  MG 4/7, 100 MG 4/8, WITH AD

## 2019-04-08 NOTE — PLAN OF CARE
Problem: Patient Centered Care  Goal: Patient preferences are identified and integrated in the patient's plan of care  Description  Interventions:  - What would you like us to know as we care for you? \"I am a .  My  is an anesthesi

## 2019-04-08 NOTE — PROGRESS NOTES
[facility]    Progress Note    Maritza Kern Patient Status:  Observation    1965 MRN [id number]   Location [facility]5W Attending George Isbell MD   Hosp Day # 0 PCP Elvira Moreno MD        Subjective:     Constitut 04/08/2019    BUN 22 (H) 04/08/2019     04/08/2019    K 4.2 04/08/2019    K 4.2 04/08/2019     04/08/2019    CO2 28.0 04/08/2019     (H) 04/08/2019    CA 10.1 04/08/2019    ALB 3.6 04/08/2019    ALKPHO 111 (H) 04/08/2019    BILT 0.3 04/0 changes have occurred. Electronically signed on 04/08/2019 at 16:00 by Clarence Razo.  Brendan Dao MD  4/8/2019

## 2019-04-08 NOTE — HISTORICAL OFFICE NOTE
Katrina Corbinraheel  : 1965  ACCOUNT:  [id number]  [phone]  PCP: Dr. Chata Teague     TODAY'S DATE: 2019  DICTATED BY:  [Dr. Daniel Neighbours: [Followup of Hypertension.]    HPI:    [On 2019, Roxanna Marin, a 48 year [employer] [facility]    MEDICATIONS: Selected prescriptions see below    VITAL SIGNS: [B/P - 110/60 , Pulse - 70, Weight -  159, Height -   67 , BMI - 24.9 ]    CONS: well developed, well nourished. WEIGHT: BMI parameters reviewed and discussed.  HEAD/ allergy some 30 years ago and being treated for Hodgkin's. It was a rash. We also could potentially do a VQ scan to look for pulmonary emboli but this is pretty unlikely. Her  and coworkers say that from time to time her lips get blue randomly.

## 2019-04-08 NOTE — PLAN OF CARE
Received call from pharmacy that Empagliflozin [facility]) is non-formulary. Pt's  took all home medications at this time.  Pt made aware of medication being non-formulary and able to take home dose during hospitalization after pharmacy verifies and

## 2019-04-08 NOTE — PLAN OF CARE
IVF per renal @ 75ml/hr. Urine electrolytes and UA collected. Maalox PRN administered for c/o acid reflux overnight.  Steroid prep d/t dye allergy for cCTA and 2D echo in AM.     0530 - CT department notified that day shift RN is to be notified 1 hr prior t

## 2019-04-08 NOTE — PROGRESS NOTES
Cardiology  Patient seen earlier  CTA without any significant disease. Echo with nl ef, I reviewed bubble study, I see no pfo on that or ct.    Pro bnp mildly elevated for age but no clinical chf, no pe  I have no cardiac cause for chest pain nor for perio

## 2019-04-08 NOTE — PROGRESS NOTES
[facility]    Progress Note    Diana Luong Patient Status:  Observation    1965 MRN [id number]   Location [facility]5W Attending Isabella Edwards MD   Hosp Day # 0 PCP León Arce MD       SUBJECTIVE:    Still not f (OVV=[id number])    Result Date: 4/7/2019  CONCLUSION: Ventilation/perfusion lung scan demonstrates no evidence for pulmonary embolism. The retained radiotracer on the ventilation portion indicates air trapping possibly from bronchospasm or COPD.   Please corre See Admin Instructions   Or      metoprolol Tartrate (LOPRESSOR) 5 MG/5ML injection 5 mg 5 mg Intravenous See Admin Instructions   Or      dilTIAZem HCl (CARDIZEM) injection 10 mg 10 mg Intravenous See Admin Instructions   Or      metoprolol Tartrate (LOPR alkalosis  - nephrology consulted     Perioral cyanosis  - pulm and cards consulted  - v/q scan done and neg for pe  - cxr negaitive  - ABG with minimally elevated carboxyhemoglobin - d/w Pulm  - await echo results to evaluate for shunt      Lactic acidosi

## 2019-04-09 ENCOUNTER — APPOINTMENT (OUTPATIENT)
Dept: CT IMAGING | Facility: HOSPITAL | Age: 54
End: 2019-04-09
Attending: INTERNAL MEDICINE
Payer: COMMERCIAL

## 2019-04-09 VITALS
RESPIRATION RATE: 18 BRPM | BODY MASS INDEX: 25.94 KG/M2 | SYSTOLIC BLOOD PRESSURE: 112 MMHG | DIASTOLIC BLOOD PRESSURE: 66 MMHG | HEIGHT: 67 IN | TEMPERATURE: 99 F | WEIGHT: 165.31 LBS | HEART RATE: 73 BPM | OXYGEN SATURATION: 97 %

## 2019-04-09 PROCEDURE — 94726 PLETHYSMOGRAPHY LUNG VOLUMES: CPT | Performed by: INTERNAL MEDICINE

## 2019-04-09 PROCEDURE — 99214 OFFICE O/P EST MOD 30 MIN: CPT | Performed by: INTERNAL MEDICINE

## 2019-04-09 PROCEDURE — 94729 DIFFUSING CAPACITY: CPT | Performed by: INTERNAL MEDICINE

## 2019-04-09 PROCEDURE — 94060 EVALUATION OF WHEEZING: CPT | Performed by: INTERNAL MEDICINE

## 2019-04-09 PROCEDURE — 99217 OBSERVATION CARE DISCHARGE: CPT | Performed by: HOSPITALIST

## 2019-04-09 PROCEDURE — 99226 SUBSEQUENT OBSERVATION CARE: CPT | Performed by: INTERNAL MEDICINE

## 2019-04-09 PROCEDURE — 71250 CT THORAX DX C-: CPT | Performed by: INTERNAL MEDICINE

## 2019-04-09 RX ORDER — POTASSIUM CHLORIDE 20 MEQ/1
40 TABLET, EXTENDED RELEASE ORAL DAILY
Status: DISCONTINUED | OUTPATIENT
Start: 2019-04-09 | End: 2019-04-09

## 2019-04-09 RX ORDER — INDAPAMIDE 2.5 MG/1
5 TABLET, FILM COATED ORAL DAILY
Status: DISCONTINUED | OUTPATIENT
Start: 2019-04-09 | End: 2019-04-09

## 2019-04-09 RX ORDER — ALBUTEROL SULFATE 2.5 MG/3ML
SOLUTION RESPIRATORY (INHALATION)
Status: DISCONTINUED
Start: 2019-04-09 | End: 2019-04-09

## 2019-04-09 RX ORDER — INDAPAMIDE 2.5 MG/1
2.5 TABLET, FILM COATED ORAL DAILY
Status: DISCONTINUED | OUTPATIENT
Start: 2019-04-10 | End: 2019-04-09

## 2019-04-09 NOTE — PROGRESS NOTES
Pt seen today   twice     discussed w  Johnie Mckeon who is  afriend of mine  Will re start indipamide in am  Per pt request.   Await pulm workup  ? Interstitial fibrosis,  Dec perfusion of lungs?   Dr Karina Medrano doesn't feel this is cardiac which I agree with

## 2019-04-09 NOTE — PROGRESS NOTES
[facility]    Progress Note    Malu Mood Patient Status:  Observation    1965 MRN [id number]   Location [address] [facility] Attending No att. providers found   Hosp Day # 0 PCP Steve Busby MD        Subjective:     Ryan Son interstitial lung disease. Biapical pleural parenchymal scarring. No focal airspace consolidation. Age indeterminate right posterior 7th rib fracture. Correlate with point tenderness and history.    Dictated by (CST): Aide Noel MD on 4/09/2019 a

## 2019-04-09 NOTE — PLAN OF CARE
Problem: Patient Centered Care  Goal: Patient preferences are identified and integrated in the patient's plan of care  Description  Interventions:  - What would you like us to know as we care for you? \"I am a .  My  is an anesthesi \"out of shape. \" She insisted on continuing to ambulate; her O2 sat rebounded to upper 90's, but would again suddenly drop to mid 80's.  This occurred 4 times within 8-10 minutes of walking, and each occurrence, O2 sat rebounded to upper 90's without rest.

## 2019-04-09 NOTE — PROGRESS NOTES
S: c/o of fatigue     O:   Blood pressure (!) 84/46, pulse 64, temperature 98.8 °F (37.1 °C), temperature source Oral, resp. rate 18, height 170.2 cm (5' 7\"), weight 165 lb 4.8 oz (75 kg), SpO2 97 %.        04/09/19  0459 04/09/19  0810 04/09/19  0930 04/0 mg Intravenous See Admin Instructions    Or   • metoprolol Tartrate  5 mg Intravenous See Admin Instructions   • amLODIPine Besylate  5 mg Oral Nightly   • DULoxetine HCl  60 mg Oral Nightly   • ezetimibe  10 mg Oral Daily   • insulin detemir  7 Units Subc

## 2019-04-09 NOTE — PROCEDURES
[facility]    PFT Interpretation    [address]     1965 MRN [id number]   Height 5' 7\" (170.2 cm) Age 48year old   Weight 165 lb 4.8 oz (75 kg) Sex Female         Spirometry:     FEV1 3.06 L which is 103 %   FEV1 /FVC 81 %

## 2019-04-09 NOTE — PLAN OF CARE
Problem: Patient Centered Care  Goal: Patient preferences are identified and integrated in the patient's plan of care  Description  Interventions:  - What would you like us to know as we care for you? \"I am a .  My  is an anesthesi consult  - Plan for cCTA and 2D echo in AM   - See additional Care Plan goals for specific interventions   4/9/2019 1301 by Nhan Chang, RN  Outcome: Adequate for Discharge  4/9/2019 1004 by Nhan Chang, RN  Outcome: Not Progressing

## 2019-04-09 NOTE — PLAN OF CARE
Problem: Patient Centered Care  Goal: Patient preferences are identified and integrated in the patient's plan of care  Description  Interventions:  - What would you like us to know as we care for you? \"I am a .  My  is an anesthesi respiratory and cardiac stability.

## 2019-04-09 NOTE — PROGRESS NOTES
[facility]  Nephrology Daily Progress Note    Violet Caraballo  [id number]  48year old      HPI:   Violet Caraballo is a 48year old female. Comfortable at rest.  Just completed PFTs. Results pending.  RN was walking with pt today and t strength  no deformities  Extremities: no edema, cyanosis, or clubbing  Neurological: exam appropriate for age reflexes and motor skills appropriate for age    Labs:  Lab Results   Component Value Date    CREATSERUM 0.99 04/09/2019    BUN 22 04/09/2019 Please correlate clinically. Dictated by (CST): Feng Lowe MD on 4/07/2019 at 13:35     Approved by (CST): Feng Lowe MD on 4/07/2019 at 13:39          Ct Cardiac Over Read    Result Date: 4/8/2019  CONCLUSION:  CT cardiac over read.  Ple Tartrate (LOPRESSOR) 5 MG/5ML injection 5 mg, 5 mg, Intravenous, See Admin Instructions  •  amLODIPine Besylate (NORVASC) tab 5 mg, 5 mg, Oral, Nightly  •  diazepam (VALIUM) tab 2 mg, 2 mg, Oral, Nightly PRN  •  DULoxetine HCl (CYMBALTA) DR particles cap 6 likely had contraction alkalosis secondary to over diueresis. Start KCL 40 meq qd. Discussed with RN and Dr. Douglas Parra. Await PFT results.              4/9/2019  Dillon Trujillo MD

## 2019-04-09 NOTE — CONSULTS
[facility]    PATIENT'S NAME: Bhavna Gray HASMUKH   ATTENDING PHYSICIAN: Tsering Dalton MD   CONSULTING PHYSICIAN: Steve Fountain.  Mary Jo iGron MD   PATIENT ACCOUNT#:   [id number]    LOCATION:  [address] [place] [address] #:   [id number]       DATE OF BIRTH: underwent chemo and received vincristine and bleomycin. She subsequently developed type 1 diabetes, which she states is well controlled. PAST SURGICAL HISTORY:  The patient has also had an appendectomy, a hip pinning, and laparoscopic surgery.      ME which could cause right-to-left shunting, and she is going to get a high-chest resolution CAT scan tomorrow. Her oxygen has desaturated when she is walking. It is possible she has a diffusion problem with her lungs or fibrosis.   We will defer to Dr. Hakan Avitia

## 2019-04-10 ENCOUNTER — ANESTHESIA EVENT (OUTPATIENT)
Dept: INTERVENTIONAL RADIOLOGY/VASCULAR | Facility: HOSPITAL | Age: 54
End: 2019-04-10
Payer: COMMERCIAL

## 2019-04-10 ENCOUNTER — HOSPITAL ENCOUNTER (OUTPATIENT)
Dept: INTERVENTIONAL RADIOLOGY/VASCULAR | Facility: HOSPITAL | Age: 54
Discharge: HOME | End: 2019-04-10
Attending: INTERNAL MEDICINE | Admitting: INTERNAL MEDICINE
Payer: COMMERCIAL

## 2019-04-10 ENCOUNTER — APPOINTMENT (OUTPATIENT)
Dept: CV DIAGNOSTICS | Facility: HOSPITAL | Age: 54
End: 2019-04-10
Attending: INTERNAL MEDICINE
Payer: COMMERCIAL

## 2019-04-10 VITALS
DIASTOLIC BLOOD PRESSURE: 70 MMHG | BODY MASS INDEX: 25.96 KG/M2 | SYSTOLIC BLOOD PRESSURE: 108 MMHG | HEART RATE: 73 BPM | WEIGHT: 165.38 LBS | HEIGHT: 67 IN | RESPIRATION RATE: 13 BRPM | OXYGEN SATURATION: 97 %

## 2019-04-10 PROCEDURE — 36415 COLL VENOUS BLD VENIPUNCTURE: CPT

## 2019-04-10 PROCEDURE — 93312 ECHO TRANSESOPHAGEAL: CPT

## 2019-04-10 PROCEDURE — 82962 GLUCOSE BLOOD TEST: CPT

## 2019-04-10 PROCEDURE — 93325 DOPPLER ECHO COLOR FLOW MAPG: CPT | Performed by: INTERNAL MEDICINE

## 2019-04-10 PROCEDURE — 93320 DOPPLER ECHO COMPLETE: CPT | Performed by: INTERNAL MEDICINE

## 2019-04-10 PROCEDURE — 84132 ASSAY OF SERUM POTASSIUM: CPT | Performed by: INTERNAL MEDICINE

## 2019-04-10 PROCEDURE — B24BZZ4 ULTRASONOGRAPHY OF HEART WITH AORTA, TRANSESOPHAGEAL: ICD-10-PCS | Performed by: INTERNAL MEDICINE

## 2019-04-10 RX ORDER — ONDANSETRON 2 MG/ML
4 INJECTION INTRAMUSCULAR; INTRAVENOUS ONCE AS NEEDED
Status: DISCONTINUED | OUTPATIENT
Start: 2019-04-10 | End: 2019-04-10

## 2019-04-10 RX ORDER — DEXTROSE MONOHYDRATE 25 G/50ML
50 INJECTION, SOLUTION INTRAVENOUS
Status: DISCONTINUED | OUTPATIENT
Start: 2019-04-10 | End: 2019-04-10

## 2019-04-10 RX ORDER — SODIUM CHLORIDE 9 MG/ML
INJECTION, SOLUTION INTRAVENOUS CONTINUOUS
Status: DISCONTINUED | OUTPATIENT
Start: 2019-04-10 | End: 2019-04-10

## 2019-04-10 RX ORDER — HALOPERIDOL 5 MG/ML
0.25 INJECTION INTRAMUSCULAR ONCE AS NEEDED
Status: DISCONTINUED | OUTPATIENT
Start: 2019-04-10 | End: 2019-04-10

## 2019-04-10 RX ORDER — LIDOCAINE HYDROCHLORIDE 10 MG/ML
INJECTION, SOLUTION EPIDURAL; INFILTRATION; INTRACAUDAL; PERINEURAL AS NEEDED
Status: DISCONTINUED | OUTPATIENT
Start: 2019-04-10 | End: 2019-04-10 | Stop reason: SURG

## 2019-04-10 RX ORDER — SODIUM CHLORIDE, SODIUM LACTATE, POTASSIUM CHLORIDE, CALCIUM CHLORIDE 600; 310; 30; 20 MG/100ML; MG/100ML; MG/100ML; MG/100ML
INJECTION, SOLUTION INTRAVENOUS CONTINUOUS
Status: DISCONTINUED | OUTPATIENT
Start: 2019-04-10 | End: 2019-04-10

## 2019-04-10 RX ORDER — NALOXONE HYDROCHLORIDE 0.4 MG/ML
80 INJECTION, SOLUTION INTRAMUSCULAR; INTRAVENOUS; SUBCUTANEOUS AS NEEDED
Status: DISCONTINUED | OUTPATIENT
Start: 2019-04-10 | End: 2019-04-10

## 2019-04-10 RX ORDER — SODIUM CHLORIDE, SODIUM LACTATE, POTASSIUM CHLORIDE, CALCIUM CHLORIDE 600; 310; 30; 20 MG/100ML; MG/100ML; MG/100ML; MG/100ML
INJECTION, SOLUTION INTRAVENOUS CONTINUOUS PRN
Status: DISCONTINUED | OUTPATIENT
Start: 2019-04-10 | End: 2019-04-10 | Stop reason: SURG

## 2019-04-10 RX ORDER — SODIUM CHLORIDE 9 MG/ML
INJECTION, SOLUTION INTRAVENOUS
Status: DISCONTINUED
Start: 2019-04-10 | End: 2019-04-10

## 2019-04-10 RX ADMIN — LIDOCAINE HYDROCHLORIDE 50 MG: 10 INJECTION, SOLUTION EPIDURAL; INFILTRATION; INTRACAUDAL; PERINEURAL at 14:20:00

## 2019-04-10 RX ADMIN — SODIUM CHLORIDE, SODIUM LACTATE, POTASSIUM CHLORIDE, CALCIUM CHLORIDE: 600; 310; 30; 20 INJECTION, SOLUTION INTRAVENOUS at 14:09:00

## 2019-04-10 NOTE — PROGRESS NOTES
Patient awake and alert, no episodes of circumoral cyanosis while here, Instructions were given to the patient, she stated understanding  Escorted to [facility]. IV's removed, IV's removed, drinking cold water 1 hour after lidocaine spray, without problems.

## 2019-04-10 NOTE — ANESTHESIA PREPROCEDURE EVALUATION
Anesthesia PreOp Note    HPI:     Tommie Norman is a 48year old female who presents for preoperative consultation requested by: * No surgeons listed *    Date of Surgery: 4/10/2019    * No procedures listed *  Indication: * No pre-op diagnosis Oral Tab Take 1.25 mg by mouth daily. Take 2 tablets by mouth every morning Disp:  Rfl:  4/9/2019 at Unknown time   Olmesartan Medoxomil 40 MG Oral Tab Take 40 mg by mouth daily.  Take after breakfast Disp:  Rfl:  4/9/2019 at Unknown time   ezetimibe 10 MG Damaris Martini MD     No current [facility]-ordered outpatient medications on file.       Iodinated Diagnosti*    HIVES    Comment:Other reaction(s): IODINATED CONTRAST MEDIA - IV             DYE  Penicillins                 Family History   Problem Relation Age of Onset Transfusions: Not Asked        Caffeine Concern: Yes          coffee-1 cup/day        Occupational Exposure: Not Asked        Hobby Hazards: Not Asked        Sleep Concern: Not Asked        Stress Concern: Not Asked        Weight Concern: Not Asked Neuro/Psych - negative ROS     GI/Hepatic/Renal    (+) hepatitis,     Endo/Other    (+) diabetes mellitus,     Comments: H/o hodgkin's dz  S/p splenectomy  Abdominal              Anesthesia Plan:   ASA:  3  Plan:   MAC  Informed Consent Plan and Risks Disc

## 2019-04-10 NOTE — ANESTHESIA POSTPROCEDURE EVALUATION
Patient: Fela Waters    Procedure Summary     Date:  04/10/19 Room / Location:  [facility]    Anesthesia Start:  1401 Anesthesia Stop:      Procedure:  EP SHERYL Diagnosis:  (Cyanosis, hypoxia)    Scheduled Providers:

## 2019-04-23 ENCOUNTER — HOSPITAL ENCOUNTER (OUTPATIENT)
Dept: ENDOCRINOLOGY | Facility: HOSPITAL | Age: 54
Discharge: HOME | End: 2019-04-23
Attending: INTERNAL MEDICINE
Payer: COMMERCIAL

## 2019-04-23 VITALS — BODY MASS INDEX: 26 KG/M2 | WEIGHT: 163.69 LBS

## 2019-04-23 DIAGNOSIS — E10.65 TYPE 1 DIABETES MELLITUS WITH HYPERGLYCEMIA, WITH LONG-TERM CURRENT USE OF INSULIN (HCC): Primary | ICD-10-CM

## 2019-04-23 NOTE — PROGRESS NOTES
Lolita Cline 8/25/1965 attended for Intensive Management: Diabetes Education  Date: 4/23/2019 Start Time: 1:15 pm End Time: 2:15 pm      HgA1c: 6.4%    Lolita Metzger currently uses a Dexcom for blood glucose management.  She reports her fasting blood

## 2019-04-25 ENCOUNTER — TELEPHONE (OUTPATIENT)
Dept: ENDOCRINOLOGY | Facility: HOSPITAL | Age: 54
End: 2019-04-25

## 2019-05-04 ENCOUNTER — HOSPITAL ENCOUNTER (OUTPATIENT)
Dept: ENDOCRINOLOGY | Facility: HOSPITAL | Age: 54
Discharge: HOME | End: 2019-05-04
Attending: INTERNAL MEDICINE
Payer: COMMERCIAL

## 2019-05-04 VITALS — WEIGHT: 164 LBS | BODY MASS INDEX: 26 KG/M2

## 2019-05-04 DIAGNOSIS — E11.9 CONTROLLED TYPE 2 DIABETES MELLITUS WITHOUT COMPLICATION, WITHOUT LONG-TERM CURRENT USE OF INSULIN (HCC): Primary | ICD-10-CM

## 2019-05-04 NOTE — PROGRESS NOTES
Katie Cline  : 1965 was seen for Pre-Pump Education:    Date: 2019   Start time: 11:15 A End time: 12:45 P  Assessment:    Counts carbs accurately?: Yes - guesstimating on pastas, eating out -- to use carb manager, use fist to [employer] understanding and has no further questions at this time.     Gaby Starr RD

## 2019-06-03 ENCOUNTER — TELEPHONE (OUTPATIENT)
Dept: ENDOCRINOLOGY | Facility: HOSPITAL | Age: 54
End: 2019-06-03

## 2019-06-03 NOTE — TELEPHONE ENCOUNTER
Answered several questions about lows in court. Wanted to stop long acting insulin to prevent this - discussed DKA and minimum dose of 3-6 units long acting insulin for runs or court dates. Using 7-10 units on day - to day when not running or in court.

## 2019-06-03 NOTE — TELEPHONE ENCOUNTER
Reports she is having lows in court. Saida asked that I call her back; left a message with hours of work and number for call back.

## 2019-06-10 ENCOUNTER — APPOINTMENT (OUTPATIENT)
Dept: ENDOCRINOLOGY | Facility: HOSPITAL | Age: 54
End: 2019-06-10
Attending: INTERNAL MEDICINE
Payer: COMMERCIAL

## 2019-06-13 ENCOUNTER — HOSPITAL ENCOUNTER (OUTPATIENT)
Dept: ENDOCRINOLOGY | Facility: HOSPITAL | Age: 54
Discharge: HOME | End: 2019-06-13
Attending: INTERNAL MEDICINE
Payer: COMMERCIAL

## 2019-06-13 VITALS — BODY MASS INDEX: 26 KG/M2 | WEIGHT: 164.5 LBS

## 2019-06-13 DIAGNOSIS — E10.65 TYPE 1 DIABETES MELLITUS WITH HYPERGLYCEMIA, WITH LONG-TERM CURRENT USE OF INSULIN (HCC): Primary | ICD-10-CM

## 2019-06-13 NOTE — PROGRESS NOTES
Solomon Cline  : 1965 was seen for Initial Pump Education: Tandem  x2 with Basal IQ technology    Date: 2019   Start time: 8:05 am End time: 10:15 am    Assessment: Wt 164 lb 8 oz   BMI 25.76 kg/m²        Taught concept of insulin p

## 2019-06-22 ENCOUNTER — HOSPITAL ENCOUNTER (OUTPATIENT)
Dept: ENDOCRINOLOGY | Facility: HOSPITAL | Age: 54
Discharge: HOME | End: 2019-06-22
Attending: INTERNAL MEDICINE
Payer: COMMERCIAL

## 2019-06-22 DIAGNOSIS — E10.65 TYPE 1 DIABETES MELLITUS WITH HYPERGLYCEMIA, WITH LONG-TERM CURRENT USE OF INSULIN (HCC): Primary | ICD-10-CM

## 2019-06-22 NOTE — PROGRESS NOTES
Katie Cline  : 1965 was seen for Insulin Pump Follow up: Tandem with Basal IQ Technology    Date: 2019   Start time: 10:30 am End time: 11:45 am    Sandrotara Marsh is happy with starting the insulin pump.  She feels it is helping her manag mg/dl      Patient verbalized understanding and has no further questions at this time.   Download faxed to office  Next appointment 7/27/18  Annie Pandey RN

## 2019-07-11 ENCOUNTER — HOSPITAL ENCOUNTER (OUTPATIENT)
Age: 54
Discharge: HOME | End: 2019-07-11
Attending: EMERGENCY MEDICINE
Payer: COMMERCIAL

## 2019-07-11 VITALS
BODY MASS INDEX: 23.23 KG/M2 | TEMPERATURE: 98 F | WEIGHT: 148 LBS | HEIGHT: 67 IN | RESPIRATION RATE: 18 BRPM | SYSTOLIC BLOOD PRESSURE: 134 MMHG | DIASTOLIC BLOOD PRESSURE: 87 MMHG | HEART RATE: 77 BPM | OXYGEN SATURATION: 98 %

## 2019-07-11 DIAGNOSIS — J02.8 ACUTE BACTERIAL PHARYNGITIS: Primary | ICD-10-CM

## 2019-07-11 DIAGNOSIS — B96.89 ACUTE BACTERIAL PHARYNGITIS: Primary | ICD-10-CM

## 2019-07-11 LAB — S PYO AG THROAT QL: NEGATIVE

## 2019-07-11 PROCEDURE — 99213 OFFICE O/P EST LOW 20 MIN: CPT

## 2019-07-11 PROCEDURE — 99214 OFFICE O/P EST MOD 30 MIN: CPT

## 2019-07-11 PROCEDURE — 87430 STREP A AG IA: CPT

## 2019-07-11 RX ORDER — AZITHROMYCIN 250 MG/1
TABLET, FILM COATED ORAL
Qty: 1 PACKAGE | Refills: 0 | Status: SHIPPED | OUTPATIENT
Start: 2019-07-11 | End: 2019-07-16

## 2019-07-11 NOTE — DISCHARGE SUMMARY
[facility]    Discharge Summary    [address] [facility] [place] Patient Status:  Observation    1965 MRN [id number]   Location [address] [facility] Attending No att. providers found   Hosp Day # 0 PCP Aleks Flower MD     Date of Admis Chest pain and  Dyspnea on exertion  - consult with cardiology and pulmonology  - plan for CTA in the morning - no acute findings  - prednisone protocol prior to CTA due to contrast allergy  - ABG reviewed   - echo with bubble study wnl     Hypokalemia before meals. Sliding scale based on carb intake at meals   Refills:  0     indapamide 1.25 MG Tabs  Commonly known as:  LOZOL      Take 1.25 mg by mouth daily.  Take 2 tablets by mouth every morning   Refills:  0     insulin glargine 100 UNIT/ML Soln  Comm

## 2019-07-11 NOTE — ED INITIAL ASSESSMENT (HPI)
Pt presents to the IC with c/o a sore throat for the last 6 weeks. No nasal congestion or cough. No fevers. Hx of type I diabetes and a slenectomy.

## 2019-07-11 NOTE — ED PROVIDER NOTES
Patient Seen in: [address]    History   Patient presents with:  Sore Throat    Stated Complaint: sore throat    HPI    Patient complains of sore throat which she has had for the past 6 weeks.   Symptoms have been persist no chest pain  GI no vomiting or diarrhea  Musculoskeletal no swelling  Neurologic denies dizziness  Skin no rash      All other systems reviewed and negative except as noted above.     Physical Exam     ED Triage Vitals [07/11/19 1222]   /87   Pulse improve        Medications Prescribed:  Current Discharge Medication List    START taking these medications    azithromycin (ZITHROMAX Z-ZORAIDA) 250 MG Oral Tab  500 mg once followed by 250 mg daily x 4 days  Qty: 1 Package Refills: 0

## 2019-07-27 ENCOUNTER — APPOINTMENT (OUTPATIENT)
Dept: ENDOCRINOLOGY | Facility: HOSPITAL | Age: 54
End: 2019-07-27
Attending: INTERNAL MEDICINE
Payer: COMMERCIAL

## 2019-08-28 ENCOUNTER — TELEPHONE (OUTPATIENT)
Dept: PULMONOLOGY | Facility: CLINIC | Age: 54
End: 2019-08-28

## 2019-08-29 NOTE — TELEPHONE ENCOUNTER
Pt called back attempt to transfer no answer please call   Pt is in [address] and will call back Tuesday

## 2019-09-03 NOTE — TELEPHONE ENCOUNTER
Pt calling again to schedule appt. Pt states she is back from [facility].  Please call [phone]

## 2019-09-03 NOTE — TELEPHONE ENCOUNTER
Consult appt made for 9/19/19 1:15. Pt notified of time date and place of appt. Pt verbalized understanding. Pt states Dr Carolee Williamson spoke to her  and suggested he prescribe an inhaler.   Pt states she has been using the inhaler 1 puff daily (inhaler n

## 2019-09-30 ENCOUNTER — LAB ENCOUNTER (OUTPATIENT)
Dept: LAB | Facility: HOSPITAL | Age: 54
End: 2019-09-30
Attending: INTERNAL MEDICINE
Payer: COMMERCIAL

## 2019-09-30 DIAGNOSIS — E10.9 DIABETES MELLITUS TYPE I (HCC): Primary | ICD-10-CM

## 2019-09-30 DIAGNOSIS — E78.2 MIXED HYPERLIPIDEMIA: ICD-10-CM

## 2019-09-30 PROCEDURE — 83036 HEMOGLOBIN GLYCOSYLATED A1C: CPT

## 2019-09-30 PROCEDURE — 82043 UR ALBUMIN QUANTITATIVE: CPT

## 2019-09-30 PROCEDURE — 80053 COMPREHEN METABOLIC PANEL: CPT

## 2019-09-30 PROCEDURE — 82570 ASSAY OF URINE CREATININE: CPT

## 2019-09-30 PROCEDURE — 36415 COLL VENOUS BLD VENIPUNCTURE: CPT

## 2019-09-30 PROCEDURE — 80061 LIPID PANEL: CPT

## 2019-10-08 ENCOUNTER — TELEPHONE (OUTPATIENT)
Dept: HEMATOLOGY/ONCOLOGY | Facility: HOSPITAL | Age: 54
End: 2019-10-08

## 2019-10-08 ENCOUNTER — OFFICE VISIT (OUTPATIENT)
Dept: HEMATOLOGY/ONCOLOGY | Facility: HOSPITAL | Age: 54
End: 2019-10-08
Attending: INTERNAL MEDICINE
Payer: COMMERCIAL

## 2019-10-08 VITALS
DIASTOLIC BLOOD PRESSURE: 79 MMHG | HEART RATE: 78 BPM | BODY MASS INDEX: 24.17 KG/M2 | WEIGHT: 154 LBS | TEMPERATURE: 98 F | SYSTOLIC BLOOD PRESSURE: 124 MMHG | RESPIRATION RATE: 16 BRPM | HEIGHT: 67 IN

## 2019-10-08 DIAGNOSIS — D72.9 NEUTROPHILIC LEUKOCYTOSIS: ICD-10-CM

## 2019-10-08 DIAGNOSIS — D89.2 ELEVATED IMMUNE PROTEIN IN BLOOD: Primary | ICD-10-CM

## 2019-10-08 DIAGNOSIS — D75.839 THROMBOCYTOSIS: ICD-10-CM

## 2019-10-08 DIAGNOSIS — G89.29 CHRONIC BILATERAL THORACIC BACK PAIN: ICD-10-CM

## 2019-10-08 DIAGNOSIS — M54.6 CHRONIC BILATERAL THORACIC BACK PAIN: ICD-10-CM

## 2019-10-08 DIAGNOSIS — M79.10 MUSCULAR PAIN: ICD-10-CM

## 2019-10-08 PROBLEM — D72.828 NEUTROPHILIC LEUKOCYTOSIS: Status: ACTIVE | Noted: 2019-10-08

## 2019-10-08 PROCEDURE — 99245 OFF/OP CONSLTJ NEW/EST HI 55: CPT | Performed by: INTERNAL MEDICINE

## 2019-10-08 RX ORDER — LISINOPRIL 10 MG/1
10 TABLET ORAL 2 TIMES DAILY
COMMUNITY
Start: 2019-10-04 | End: 2022-01-26

## 2019-10-08 NOTE — TELEPHONE ENCOUNTER
Pt called per Dr. Tien Tam request and notified that she spoke with Dr. Zakiya Xiao regarding low K+ level 2.8. Informed that symptoms of nausea,anorexia,muscle weakness and cramps can be related to low potassium level.  Dr. Zakiya Xiao ordered potassium and will monitor di

## 2019-10-08 NOTE — PROGRESS NOTES
NISHANT Ontiveros is a 47year old female who is here today for evaluation of Elevated immune protein in blood  (primary encounter diagnosis)  Muscular pain  Chronic bilateral thoracic back pain      Findings were initially noted on April 8, past 4 month.) and unexpected weight change. HENT:          Denies URI symptoms. Had URI in Sept that lasted 4 weeks. Respiratory: Positive for cough (dry cough prior to lisinopril, about 7 months.) and shortness of breath (BUSBY).     Cardiovascular Disp:  Rfl:    CARTIA  MG Oral Capsule SR 24 Hr TAKE TWO CAPSULES BY MOUTH IN THE MORNING  Disp: 60 capsule Rfl: 3   Glucose Blood In Vitro Strip by Misc. (Non-Drug; Combo Route) route.  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   Patient does have some symptoms that could be related to inflammation, particularly the proximal muscle myalgias that she has been describing normocytosis, will evaluate iron studies to rule out iron deficiency. Once the patient's results are available we will notify her of these results, and further work-up is necessary, as well as a follow-up pending results of these tests.         Orders Pl >60   GFR/      >60    >60   Bilirubin, Direct      0.0 - 0.2 mg/dL  0.1         Component      Latest Ref Rng & Units 4/8/2019 4/7/2019 12/26/2018 10/24/2014   WBC      4.0 - 11.0 x10(3) uL 16.5 (H) 12.9 (H) 10.5 9.5   RBC      3.80 - 5 0.9 mg/dL  <0.5   HSCRP      <3.00 mg/L 5.22 (H)

## 2019-10-09 NOTE — TELEPHONE ENCOUNTER
Discussed with the patient and her  test results. Patient aware of hypokalemia and d/w Dr. Esther Chun, due to diuretics. She had nl globulin. Hgb Nl but decreased 1 g from prior and evidence of iron deficiency. Recommend repeat GI w/u.     Also,

## 2019-10-10 RX ORDER — POLYETHYLENE GLYCOL 3350, SODIUM CHLORIDE, SODIUM BICARBONATE, POTASSIUM CHLORIDE 420; 11.2; 5.72; 1.48 G/4L; G/4L; G/4L; G/4L
4 POWDER, FOR SOLUTION ORAL ONCE
Qty: 4000 ML | Refills: 0 | Status: SHIPPED | OUTPATIENT
Start: 2019-10-10 | End: 2019-10-10

## 2019-10-10 NOTE — TELEPHONE ENCOUNTER
I spoke to [facility]. She has been very fatigued this year, however, she has had a difficult year with regards to heart failure secondary to diastolic dysfunction. Her diabetes is under good control. She has alternating constipation and diarrhea.   Stools

## 2019-10-11 ENCOUNTER — APPOINTMENT (OUTPATIENT)
Dept: LAB | Facility: HOSPITAL | Age: 54
End: 2019-10-11
Attending: INTERNAL MEDICINE
Payer: COMMERCIAL

## 2019-10-11 ENCOUNTER — APPOINTMENT (OUTPATIENT)
Dept: HEMATOLOGY/ONCOLOGY | Facility: HOSPITAL | Age: 54
End: 2019-10-11
Attending: INTERNAL MEDICINE
Payer: COMMERCIAL

## 2019-10-11 DIAGNOSIS — E87.6 HYPOKALEMIA: ICD-10-CM

## 2019-10-11 PROCEDURE — 80048 BASIC METABOLIC PNL TOTAL CA: CPT

## 2019-10-11 PROCEDURE — 36415 COLL VENOUS BLD VENIPUNCTURE: CPT

## 2019-10-11 RX ORDER — POTASSIUM CHLORIDE 1500 MG/1
20 TABLET, FILM COATED, EXTENDED RELEASE ORAL 2 TIMES DAILY
COMMUNITY
End: 2020-01-14 | Stop reason: ALTCHOICE

## 2019-10-11 NOTE — TELEPHONE ENCOUNTER
GI RN staff: Please schedule the patient for a colonoscopy on October 19, 2019 at 8 AM.  The procedure will be done with a split dose TriLyte preparation and monitored anesthesia care (I will arrange anesthesia support).   Please contact Dr. Dr. Ariane Lewis

## 2019-10-11 NOTE — TELEPHONE ENCOUNTER
CBLM to schedule procedure. Please transfer to [email or web address] at ext [phone] or [phone] for scheduling.

## 2019-10-11 NOTE — TELEPHONE ENCOUNTER
Called patient LMTCB so I may review what DM medications patient is on    Call center-- please transfer call to RN    Per medication list:  Insulin Aspart, w/Niacinamide, (FIASP) 100 UNIT/ML Subcutaneous Solution By Insulin Pump

## 2019-10-11 NOTE — TELEPHONE ENCOUNTER
CBLM to schedule procedure. Please transfer to [facility] at ext [phone] or [phone] for scheduling.

## 2019-10-11 NOTE — TELEPHONE ENCOUNTER
I spoke to patient she only takes:    Insulin Aspart, w/Niacinamide, (FIASP) 100 UNIT/ML Subcutaneous Solution By Insulin Pump     Called Dr. Radha Knox office at [phone] to get orders for insulin pump prior to CLN on 10/19/19.  Spoke to Josep she states of

## 2019-10-14 ENCOUNTER — TELEPHONE (OUTPATIENT)
Dept: GASTROENTEROLOGY | Facility: CLINIC | Age: 54
End: 2019-10-14

## 2019-10-14 DIAGNOSIS — Z86.010 PERSONAL HISTORY OF COLONIC POLYPS: ICD-10-CM

## 2019-10-14 DIAGNOSIS — D50.9 IRON DEFICIENCY ANEMIA, UNSPECIFIED IRON DEFICIENCY ANEMIA TYPE: Primary | ICD-10-CM

## 2019-10-14 NOTE — TELEPHONE ENCOUNTER
Called Dr. Trav Rivera office to f/u on orders and office is closed will reopen at 10 AM tomorrow.     Covering GI RN- Please get orders procedure is 10/19/19

## 2019-10-14 NOTE — TELEPHONE ENCOUNTER
Covering GI RN- Please get orders as pt is to be scheduled for 10/19/19      Called Dr. Ellington [facility] office at [phone] left message with Yuriy Guzmán at  to have RN call me back at GI RN direct line so I may get orders for insulin pump prior to procedure

## 2019-10-14 NOTE — TELEPHONE ENCOUNTER
Scheduled for:  Colonoscopy - 28938 & EGD - 00477  Provider Name:  Dr. Kervin Garcia  Date:  10/19/19  Location:  [facility]  Sedation:  MAC  Time:  8:00 am (pt is aware to arrive at 7:00 am)  Prep:  Trilyte, Prep instructions were given to pt over the phone, pt verbalized understanding. Meds/Allergies Reconciled?:  Yes, Physician reviewed  Diagnosis with codes:  JAIME - D50.9, Hx of colon polyps - Z86.010  Was patient informed to call insurance with codes (Y/N):  Yes, I confirmed [employer] PPO insurance with the patient. The patient also verbally understands to call her insurance to check for pre-cert, codes were given on prep instructions. Referral sent?:  N/A  EM or [facility] notified?:  Yes, I sent an electronic request to Endo Scheduling and received a confirmation today. Medication Orders:  Pt is aware to HOLD her Lisinopril the morning of the procedure. Routed to GI RN's to advise on insulin pump orders. Misc Orders:  N/A    Further instructions given by staff:  I discussed the prep instructions with the patient which she verbally understood and is aware that I will send the instructions today via [employer].

## 2019-10-14 NOTE — TELEPHONE ENCOUNTER
Please make sure that this patient is officially scheduled for both a colonoscopy and EGD this Saturday at 8 AM.

## 2019-10-15 RX ORDER — SODIUM CHLORIDE, SODIUM LACTATE, POTASSIUM CHLORIDE, CALCIUM CHLORIDE 600; 310; 30; 20 MG/100ML; MG/100ML; MG/100ML; MG/100ML
INJECTION, SOLUTION INTRAVENOUS CONTINUOUS
Status: CANCELLED | OUTPATIENT
Start: 2019-10-15

## 2019-10-15 NOTE — TELEPHONE ENCOUNTER
I spoke to Rajan Courtney in Dr Brittany Butcher office. Informed about 10/19 colonoscopy, and that day prior pt gets light breakfast followed by clear liquids only. Their office will need to obtain insulin pump orders from  and notify pt of these instructions.  We do n

## 2019-10-15 NOTE — TELEPHONE ENCOUNTER
See Dr Satish Beltran 10/8 encounter. This is being addressed there. I have already contacted Dr Wheeler Peers office.

## 2019-10-15 NOTE — TELEPHONE ENCOUNTER
Dr Lisa Chang called back. I reviewed below information with him. States he will personally call pt and give insulin pump orders. I notified Naomie Payne again and updated.  Left message on pt's identified voicemail that their office would be calling with insulin pu Yes

## 2019-10-16 NOTE — TELEPHONE ENCOUNTER
Called patient left VM asking pt to confirm that she received orders regarding her insulin pump prior to Saturday procedure.

## 2019-10-17 NOTE — TELEPHONE ENCOUNTER
Spoke to the patient she stated Dr. Lambert Lopez did call her and she did receive insulin pump orders from him prior to her procedure 10/19/19.

## 2019-10-19 ENCOUNTER — ANESTHESIA (OUTPATIENT)
Dept: ENDOSCOPY | Facility: HOSPITAL | Age: 54
End: 2019-10-19
Payer: COMMERCIAL

## 2019-10-19 ENCOUNTER — ANESTHESIA EVENT (OUTPATIENT)
Dept: ENDOSCOPY | Facility: HOSPITAL | Age: 54
End: 2019-10-19
Payer: COMMERCIAL

## 2019-10-19 ENCOUNTER — HOSPITAL ENCOUNTER (OUTPATIENT)
Facility: HOSPITAL | Age: 54
Setting detail: HOSPITAL OUTPATIENT SURGERY
Discharge: HOME | End: 2019-10-19
Attending: INTERNAL MEDICINE | Admitting: INTERNAL MEDICINE
Payer: COMMERCIAL

## 2019-10-19 VITALS
RESPIRATION RATE: 13 BRPM | SYSTOLIC BLOOD PRESSURE: 99 MMHG | BODY MASS INDEX: 23.23 KG/M2 | HEIGHT: 67 IN | OXYGEN SATURATION: 98 % | DIASTOLIC BLOOD PRESSURE: 68 MMHG | HEART RATE: 65 BPM | WEIGHT: 148 LBS

## 2019-10-19 DIAGNOSIS — D50.9 IRON DEFICIENCY ANEMIA, UNSPECIFIED IRON DEFICIENCY ANEMIA TYPE: ICD-10-CM

## 2019-10-19 DIAGNOSIS — Z86.010 PERSONAL HISTORY OF COLONIC POLYPS: ICD-10-CM

## 2019-10-19 PROCEDURE — 82962 GLUCOSE BLOOD TEST: CPT

## 2019-10-19 PROCEDURE — 88312 SPECIAL STAINS GROUP 1: CPT | Performed by: INTERNAL MEDICINE

## 2019-10-19 PROCEDURE — 0DB98ZX EXCISION OF DUODENUM, VIA NATURAL OR ARTIFICIAL OPENING ENDOSCOPIC, DIAGNOSTIC: ICD-10-PCS | Performed by: INTERNAL MEDICINE

## 2019-10-19 PROCEDURE — 0DB68ZX EXCISION OF STOMACH, VIA NATURAL OR ARTIFICIAL OPENING ENDOSCOPIC, DIAGNOSTIC: ICD-10-PCS | Performed by: INTERNAL MEDICINE

## 2019-10-19 PROCEDURE — 0DBL8ZX EXCISION OF TRANSVERSE COLON, VIA NATURAL OR ARTIFICIAL OPENING ENDOSCOPIC, DIAGNOSTIC: ICD-10-PCS | Performed by: INTERNAL MEDICINE

## 2019-10-19 PROCEDURE — 88305 TISSUE EXAM BY PATHOLOGIST: CPT | Performed by: INTERNAL MEDICINE

## 2019-10-19 PROCEDURE — 0DBH8ZX EXCISION OF CECUM, VIA NATURAL OR ARTIFICIAL OPENING ENDOSCOPIC, DIAGNOSTIC: ICD-10-PCS | Performed by: INTERNAL MEDICINE

## 2019-10-19 RX ORDER — NALOXONE HYDROCHLORIDE 0.4 MG/ML
80 INJECTION, SOLUTION INTRAMUSCULAR; INTRAVENOUS; SUBCUTANEOUS AS NEEDED
Status: CANCELLED | OUTPATIENT
Start: 2019-10-19 | End: 2019-10-19

## 2019-10-19 RX ORDER — DEXTROSE MONOHYDRATE 25 G/50ML
50 INJECTION, SOLUTION INTRAVENOUS
Status: CANCELLED | OUTPATIENT
Start: 2019-10-19

## 2019-10-19 RX ORDER — SODIUM CHLORIDE, SODIUM LACTATE, POTASSIUM CHLORIDE, CALCIUM CHLORIDE 600; 310; 30; 20 MG/100ML; MG/100ML; MG/100ML; MG/100ML
INJECTION, SOLUTION INTRAVENOUS CONTINUOUS
Status: CANCELLED | OUTPATIENT
Start: 2019-10-19

## 2019-10-19 RX ORDER — LIDOCAINE HYDROCHLORIDE 10 MG/ML
INJECTION, SOLUTION EPIDURAL; INFILTRATION; INTRACAUDAL; PERINEURAL AS NEEDED
Status: DISCONTINUED | OUTPATIENT
Start: 2019-10-19 | End: 2019-10-19 | Stop reason: SURG

## 2019-10-19 RX ORDER — SODIUM CHLORIDE, SODIUM LACTATE, POTASSIUM CHLORIDE, CALCIUM CHLORIDE 600; 310; 30; 20 MG/100ML; MG/100ML; MG/100ML; MG/100ML
INJECTION, SOLUTION INTRAVENOUS CONTINUOUS PRN
Status: DISCONTINUED | OUTPATIENT
Start: 2019-10-19 | End: 2019-10-19 | Stop reason: SURG

## 2019-10-19 RX ADMIN — LIDOCAINE HYDROCHLORIDE 25 MG: 10 INJECTION, SOLUTION EPIDURAL; INFILTRATION; INTRACAUDAL; PERINEURAL at 07:52:00

## 2019-10-19 RX ADMIN — SODIUM CHLORIDE, SODIUM LACTATE, POTASSIUM CHLORIDE, CALCIUM CHLORIDE: 600; 310; 30; 20 INJECTION, SOLUTION INTRAVENOUS at 07:49:00

## 2019-10-19 RX ADMIN — SODIUM CHLORIDE, SODIUM LACTATE, POTASSIUM CHLORIDE, CALCIUM CHLORIDE: 600; 310; 30; 20 INJECTION, SOLUTION INTRAVENOUS at 09:05:00

## 2019-10-19 NOTE — H&P
History & Physical Examination    Patient Name: Prasanth Corcoran  MRN: [id number]  CSN: [id number]  YOB: 1965    Diagnosis: Iron deficiency, personal history of adenomatous colon polyp, gastroesophageal reflux/regurgitation      Jamel Taking      No current facility-administered medications for this encounter.        Allergies:   Iodinated Diagnosti*    HIVES    Comment:Other reaction(s): IODINATED CONTRAST MEDIA - IV             DYE  Penicillins                 Past Medical History:   D with the patient/family. They understand and agree to proceed with plan of care. [ x ] I have reviewed the History and Physical done within the last 30 days. Any changes noted above.     Reilly Foy MD  10/19/2019  7:35 AM

## 2019-10-19 NOTE — ANESTHESIA PREPROCEDURE EVALUATION
Anesthesia PreOp Note    HPI:     Mahsa Duron is a 47year old female who presents for preoperative consultation requested by: Chiquis Allan MD    Date of Surgery: 10/19/2019    Procedure(s):  COLONOSCOPY  ESOPHAGOGASTRODUODENOSCOPY (E of hip 2013    hip pinning   • Type 1 diabetes mellitus ([facility] [address]     Type 1-C       Past Surgical History:   Procedure Laterality Date   • APPENDECTOMY  1992   • CHEMOTHERAPY  1984    Hodgkin;s disease-3B   • COLONOSCOPY  3/21/2014   • ESOPHAGOGASTRODUODENOSC 10/18/2019  CREON 44962 UNITS Oral Cap DR Particles, TAKE 3 CAPSULES BY MOUTH 3 (THREE) TIMES DAILY WITH MEALS., Disp: 810 capsule, Rfl: 3, 10/17/2019  ONETOUCH ULTRA BLUE In Vitro Strip, , Disp: , Rfl: 99, Taking      iron sucrose (VENOFER) IV Push 200 mg Active member of club or organization: Not on file        Attends meetings of clubs or organizations: Not on file        Relationship status: Not on file      Intimate partner violence:        Fear of current or ex partner: Not on file        Emotiona Resp: 14   SpO2: 98%   Weight: 67.1 kg (148 lb)   Height: 1.702 m (5' 7\")        Anesthesia Evaluation     Patient summary reviewed and Nursing notes reviewed    History of anesthetic complications   Airway   Mallampati: II  Dental      Pulmonary     br

## 2019-10-19 NOTE — ANESTHESIA POSTPROCEDURE EVALUATION
Patient: Ya Auguste    Procedure Summary     Date:  10/19/19 Room / Location:  [facility] ENDOSCOPY 01 / [facility] ENDOSCOPY    Anesthesia Start:  0431 Anesthesia Stop:  5133    Procedures:       COLONOSCOPY (N/A )      ESOPHAGOGASTRODUODENOSCOPY (EGD) (N

## 2019-10-19 NOTE — OPERATIVE REPORT
[facility] Endoscopy Report      Date of Procedure:  10/19/19      Preoperative Diagnosis:  1. Iron deficiency  2. Personal history of adenomatous colon polyp  3. Gastroesophageal reflux/regurgitation      Postoperative Diagnosis:  1.   D terminal ileum. The colon was examined upon withdrawal in the left lateral recumbent position. The procedures were well tolerated without immediate complication. Findings:   The esophagus was normal without evidence of ulceration, erosion, strictur gastric polyps likely fundic gland in nature  2. Tiny hiatal hernia  3. Ileal diverticulum  4. Multiple colon polyps      Recommendations:  1. Follow-up biopsy results. 2.  Further recommendations pending biopsy and clinical course.             Dolores Gao

## 2019-10-22 DIAGNOSIS — E61.1 IRON DEFICIENCY: Primary | ICD-10-CM

## 2019-10-28 ENCOUNTER — LAB ENCOUNTER (OUTPATIENT)
Dept: LAB | Facility: HOSPITAL | Age: 54
End: 2019-10-28
Attending: INTERNAL MEDICINE
Payer: COMMERCIAL

## 2019-10-28 DIAGNOSIS — E87.6 HYPOKALEMIA: ICD-10-CM

## 2019-10-28 DIAGNOSIS — E61.1 IRON DEFICIENCY: ICD-10-CM

## 2019-10-28 DIAGNOSIS — R06.00 DYSPNEA ON EXERTION: ICD-10-CM

## 2019-10-28 DIAGNOSIS — K86.1 IDIOPATHIC CHRONIC PANCREATITIS (HCC): Primary | ICD-10-CM

## 2019-10-28 PROCEDURE — 83540 ASSAY OF IRON: CPT

## 2019-10-28 PROCEDURE — 36415 COLL VENOUS BLD VENIPUNCTURE: CPT

## 2019-10-28 PROCEDURE — 82728 ASSAY OF FERRITIN: CPT

## 2019-10-28 PROCEDURE — 85025 COMPLETE CBC W/AUTO DIFF WBC: CPT

## 2019-10-28 PROCEDURE — 84466 ASSAY OF TRANSFERRIN: CPT

## 2019-10-28 PROCEDURE — 80048 BASIC METABOLIC PNL TOTAL CA: CPT

## 2019-10-28 RX ORDER — PREDNISONE 50 MG/1
TABLET ORAL
Qty: 3 TABLET | Refills: 0 | Status: SHIPPED | OUTPATIENT
Start: 2019-10-28 | End: 2019-12-16 | Stop reason: ALTCHOICE

## 2019-10-30 DIAGNOSIS — B99.9 INFECTION: Primary | ICD-10-CM

## 2019-10-31 ENCOUNTER — APPOINTMENT (OUTPATIENT)
Dept: LAB | Facility: HOSPITAL | Age: 54
End: 2019-10-31
Attending: ANESTHESIOLOGY
Payer: COMMERCIAL

## 2019-10-31 DIAGNOSIS — B99.9 INFECTION: ICD-10-CM

## 2019-10-31 PROCEDURE — 36415 COLL VENOUS BLD VENIPUNCTURE: CPT

## 2019-10-31 PROCEDURE — 85652 RBC SED RATE AUTOMATED: CPT

## 2019-10-31 PROCEDURE — 86140 C-REACTIVE PROTEIN: CPT

## 2019-11-03 ENCOUNTER — HOSPITAL ENCOUNTER (OUTPATIENT)
Dept: CT IMAGING | Facility: HOSPITAL | Age: 54
Discharge: HOME | End: 2019-11-03
Attending: INTERNAL MEDICINE
Payer: COMMERCIAL

## 2019-11-03 DIAGNOSIS — K86.1 IDIOPATHIC CHRONIC PANCREATITIS (HCC): ICD-10-CM

## 2019-11-05 ENCOUNTER — TELEPHONE (OUTPATIENT)
Dept: HEMATOLOGY/ONCOLOGY | Facility: HOSPITAL | Age: 54
End: 2019-11-05

## 2019-11-05 DIAGNOSIS — D72.9 NEUTROPHILIC LEUKOCYTOSIS: ICD-10-CM

## 2019-11-05 DIAGNOSIS — D75.839 THROMBOCYTOSIS: Primary | ICD-10-CM

## 2019-11-05 NOTE — TELEPHONE ENCOUNTER
Pt called per Dr. Fátima Jhaveri request and notified noted that Dr. Saim Jane ordered a CT scan. Dr. Copeland Pastures recommending to take OTC iron 325 mg twice a day along with folic acid 101 mcg twice a day.  Pt should repeat labs in 3 months ( orders in system) with a f/u

## 2019-11-08 ENCOUNTER — HOSPITAL ENCOUNTER (OUTPATIENT)
Dept: CT IMAGING | Facility: HOSPITAL | Age: 54
Discharge: HOME | End: 2019-11-08
Attending: INTERNAL MEDICINE
Payer: COMMERCIAL

## 2019-11-08 DIAGNOSIS — E11.9 TYPE 2 DIABETES MELLITUS WITHOUT COMPLICATION, WITH LONG-TERM CURRENT USE OF INSULIN (HCC): ICD-10-CM

## 2019-11-08 DIAGNOSIS — K21.9 GASTROESOPHAGEAL REFLUX DISEASE WITHOUT ESOPHAGITIS: Primary | ICD-10-CM

## 2019-11-08 DIAGNOSIS — Z79.4 TYPE 2 DIABETES MELLITUS WITHOUT COMPLICATION, WITH LONG-TERM CURRENT USE OF INSULIN (HCC): ICD-10-CM

## 2019-11-08 PROCEDURE — 74177 CT ABD & PELVIS W/CONTRAST: CPT | Performed by: INTERNAL MEDICINE

## 2019-11-27 ENCOUNTER — LAB ENCOUNTER (OUTPATIENT)
Dept: LAB | Facility: HOSPITAL | Age: 54
End: 2019-11-27
Attending: INTERNAL MEDICINE
Payer: COMMERCIAL

## 2019-11-27 ENCOUNTER — HOSPITAL ENCOUNTER (OUTPATIENT)
Dept: MAMMOGRAPHY | Facility: HOSPITAL | Age: 54
Discharge: HOME | End: 2019-11-27
Attending: INTERNAL MEDICINE
Payer: COMMERCIAL

## 2019-11-27 DIAGNOSIS — D50.8 OTHER IRON DEFICIENCY ANEMIA: ICD-10-CM

## 2019-11-27 DIAGNOSIS — Z12.39 SCREENING FOR MALIGNANT NEOPLASM OF BREAST: ICD-10-CM

## 2019-11-27 DIAGNOSIS — E11.9 DIABETES (HCC): ICD-10-CM

## 2019-11-27 PROCEDURE — 82728 ASSAY OF FERRITIN: CPT

## 2019-11-27 PROCEDURE — 83540 ASSAY OF IRON: CPT

## 2019-11-27 PROCEDURE — 36415 COLL VENOUS BLD VENIPUNCTURE: CPT

## 2019-11-27 PROCEDURE — 80048 BASIC METABOLIC PNL TOTAL CA: CPT

## 2019-11-27 PROCEDURE — 84466 ASSAY OF TRANSFERRIN: CPT

## 2019-11-27 PROCEDURE — 85025 COMPLETE CBC W/AUTO DIFF WBC: CPT

## 2019-11-27 PROCEDURE — 77063 BREAST TOMOSYNTHESIS BI: CPT | Performed by: INTERNAL MEDICINE

## 2019-11-27 PROCEDURE — 77067 SCR MAMMO BI INCL CAD: CPT | Performed by: INTERNAL MEDICINE

## 2019-11-27 PROCEDURE — 83036 HEMOGLOBIN GLYCOSYLATED A1C: CPT

## 2019-12-03 ENCOUNTER — HOSPITAL ENCOUNTER (OUTPATIENT)
Dept: NUCLEAR MEDICINE | Facility: HOSPITAL | Age: 54
Discharge: HOME | End: 2019-12-03
Attending: INTERNAL MEDICINE
Payer: COMMERCIAL

## 2019-12-03 DIAGNOSIS — Z79.4 TYPE 2 DIABETES MELLITUS WITHOUT COMPLICATION, WITH LONG-TERM CURRENT USE OF INSULIN (HCC): ICD-10-CM

## 2019-12-03 DIAGNOSIS — E11.9 TYPE 2 DIABETES MELLITUS WITHOUT COMPLICATION, WITH LONG-TERM CURRENT USE OF INSULIN (HCC): ICD-10-CM

## 2019-12-03 DIAGNOSIS — K21.9 GASTROESOPHAGEAL REFLUX DISEASE WITHOUT ESOPHAGITIS: ICD-10-CM

## 2019-12-03 PROCEDURE — 78264 GASTRIC EMPTYING IMG STUDY: CPT | Performed by: INTERNAL MEDICINE

## 2019-12-10 ENCOUNTER — HOSPITAL ENCOUNTER (OUTPATIENT)
Dept: ULTRASOUND IMAGING | Facility: HOSPITAL | Age: 54
Discharge: HOME | End: 2019-12-10
Attending: ANESTHESIOLOGY
Payer: COMMERCIAL

## 2019-12-10 ENCOUNTER — HOSPITAL ENCOUNTER (OUTPATIENT)
Dept: MAMMOGRAPHY | Facility: HOSPITAL | Age: 54
Discharge: HOME | End: 2019-12-10
Attending: ANESTHESIOLOGY
Payer: COMMERCIAL

## 2019-12-10 DIAGNOSIS — R92.2 INCONCLUSIVE MAMMOGRAM: ICD-10-CM

## 2019-12-10 PROBLEM — R92.8 ABNORMAL MAMMOGRAM OF RIGHT BREAST: Status: ACTIVE | Noted: 2019-12-10

## 2019-12-10 PROCEDURE — 76642 ULTRASOUND BREAST LIMITED: CPT | Performed by: ANESTHESIOLOGY

## 2019-12-10 PROCEDURE — 77065 DX MAMMO INCL CAD UNI: CPT | Performed by: ANESTHESIOLOGY

## 2019-12-10 PROCEDURE — 77061 BREAST TOMOSYNTHESIS UNI: CPT | Performed by: ANESTHESIOLOGY

## 2019-12-10 NOTE — IMAGING NOTE
This nurse introduced self and role of breast coordinator. Discussed recommended breast biopsy with patient. Pt was recommended by Dr. Narinder Hendrickson to have a Right Breast ultrasound guided biopsy.    Pt history discussed as below:  INDICATIONS:  Inconcl hour before the examination. 1 tablet 0   • Potassium Chloride ER 20 MEQ Oral Tab CR Take 20 mEq by mouth 2 (two) times daily. • lisinopril 10 MG Oral Tab Take 10 mg by mouth.      • Insulin Aspart, w/Niacinamide, (FIASP) 100 UNIT/ML Subcutaneous Soluti condition should be held at the direction of your physician. Radiology's preference is to hold this medication for 7 days prior to biopsy. Informed patient to call  cardiologist to go off aspirin. Denies use.      -Blood thinners/antiplatelet medicatio heavier than a gallon of milk for 24-48 hours after the procedure. Discussed with patient that some soreness and bruising is normal after biopsy but that prolonged or increased pain and bruising should be reported to the ordering physician.    Educated

## 2019-12-11 ENCOUNTER — TELEPHONE (OUTPATIENT)
Dept: MAMMOGRAPHY | Facility: HOSPITAL | Age: 54
End: 2019-12-11

## 2019-12-11 NOTE — TELEPHONE ENCOUNTER
,.  Phoned and introduced myself as breast coordinator . Reinforced to patient bx instructions. Mrs Jackman Balloon is scheduled for Friday offered early date time.  She would like to come in on Thursday at 2 pm ok by Dr Justus Clements This rn contacted Avi Trevino requ

## 2019-12-12 ENCOUNTER — APPOINTMENT (OUTPATIENT)
Dept: MAMMOGRAPHY | Facility: HOSPITAL | Age: 54
End: 2019-12-12
Attending: INTERNAL MEDICINE
Payer: COMMERCIAL

## 2019-12-12 ENCOUNTER — HOSPITAL ENCOUNTER (OUTPATIENT)
Dept: MAMMOGRAPHY | Facility: HOSPITAL | Age: 54
Discharge: HOME | End: 2019-12-12
Attending: INTERNAL MEDICINE
Payer: COMMERCIAL

## 2019-12-12 ENCOUNTER — HOSPITAL ENCOUNTER (OUTPATIENT)
Dept: ULTRASOUND IMAGING | Facility: HOSPITAL | Age: 54
Discharge: HOME | End: 2019-12-12
Attending: INTERNAL MEDICINE
Payer: COMMERCIAL

## 2019-12-12 VITALS
OXYGEN SATURATION: 98 % | WEIGHT: 147 LBS | SYSTOLIC BLOOD PRESSURE: 114 MMHG | DIASTOLIC BLOOD PRESSURE: 82 MMHG | BODY MASS INDEX: 23.07 KG/M2 | HEIGHT: 67 IN | HEART RATE: 76 BPM

## 2019-12-12 DIAGNOSIS — R92.8 ABNORMAL MAMMOGRAM: ICD-10-CM

## 2019-12-12 DIAGNOSIS — R92.8 ABNORMAL MAMMOGRAM OF RIGHT BREAST: ICD-10-CM

## 2019-12-12 PROCEDURE — 77065 DX MAMMO INCL CAD UNI: CPT | Performed by: INTERNAL MEDICINE

## 2019-12-12 PROCEDURE — 19083 BX BREAST 1ST LESION US IMAG: CPT | Performed by: INTERNAL MEDICINE

## 2019-12-12 PROCEDURE — 88305 TISSUE EXAM BY PATHOLOGIST: CPT | Performed by: INTERNAL MEDICINE

## 2019-12-12 PROCEDURE — 88374 M/PHMTRC ALYS ISHQUANT/SEMIQ: CPT | Performed by: PATHOLOGY

## 2019-12-12 PROCEDURE — 88360 TUMOR IMMUNOHISTOCHEM/MANUAL: CPT | Performed by: INTERNAL MEDICINE

## 2019-12-12 NOTE — PROCEDURES
[facility]  Procedure Note    Mahsa Duron Patient Status:  Outpatient    1965 MRN [id number]   Location [address] [facility] Attending Johnie Ambrose MD   Hosp Day # 0 PCP Ken[facility] Osorio [facility]     [facility]

## 2019-12-12 NOTE — IMAGING NOTE
0730: Mrs. Zo Gaspar arrived to ultrasound room #4 . Identified with spelling of name and date of birth. Medications and allergies reviewed.   Denies use of anticoagulating medication in the past 5 days including: prescription anticoagulants, aspiri requisition were taken to Pathology by ultrasound technologist, Jackelin Butcher.    1200:  Mrs. Yann De La Torre brought to mammography department  for post clip images in stable condition. Steri strips to the right breast dry and intact.   Report to Mala, mammography te

## 2019-12-13 ENCOUNTER — TELEPHONE (OUTPATIENT)
Dept: HEMATOLOGY/ONCOLOGY | Facility: HOSPITAL | Age: 54
End: 2019-12-13

## 2019-12-13 NOTE — TELEPHONE ENCOUNTER
Called patient with results of her breast biopsy. Discussed that results were consistent with infiltrating ductal carcinoma of the breast.  Discussed that ER was 100%, %, KI–67 9% and HER-2 paolo was 2+, this has been sent for [facility].   I discussed with

## 2019-12-16 ENCOUNTER — OFFICE VISIT (OUTPATIENT)
Dept: SURGERY | Facility: CLINIC | Age: 54
End: 2019-12-16
Payer: COMMERCIAL

## 2019-12-16 VITALS
DIASTOLIC BLOOD PRESSURE: 71 MMHG | TEMPERATURE: 99 F | RESPIRATION RATE: 16 BRPM | SYSTOLIC BLOOD PRESSURE: 137 MMHG | BODY MASS INDEX: 23.23 KG/M2 | HEART RATE: 80 BPM | HEIGHT: 67 IN | WEIGHT: 148 LBS

## 2019-12-16 DIAGNOSIS — C50.411 MALIGNANT NEOPLASM OF UPPER-OUTER QUADRANT OF RIGHT BREAST IN FEMALE, ESTROGEN RECEPTOR POSITIVE (HCC): Primary | ICD-10-CM

## 2019-12-16 DIAGNOSIS — Z17.0 MALIGNANT NEOPLASM OF UPPER-OUTER QUADRANT OF RIGHT BREAST IN FEMALE, ESTROGEN RECEPTOR POSITIVE (HCC): Primary | ICD-10-CM

## 2019-12-16 PROCEDURE — 99245 OFF/OP CONSLTJ NEW/EST HI 55: CPT | Performed by: SURGERY

## 2019-12-17 ENCOUNTER — OFFICE VISIT (OUTPATIENT)
Dept: HEMATOLOGY/ONCOLOGY | Facility: HOSPITAL | Age: 54
End: 2019-12-17
Attending: INTERNAL MEDICINE
Payer: COMMERCIAL

## 2019-12-17 VITALS
WEIGHT: 159 LBS | BODY MASS INDEX: 24.96 KG/M2 | HEIGHT: 67 IN | TEMPERATURE: 98 F | HEART RATE: 79 BPM | SYSTOLIC BLOOD PRESSURE: 118 MMHG | RESPIRATION RATE: 16 BRPM | OXYGEN SATURATION: 99 % | DIASTOLIC BLOOD PRESSURE: 80 MMHG

## 2019-12-17 DIAGNOSIS — Z17.0 MALIGNANT NEOPLASM OF UPPER-OUTER QUADRANT OF RIGHT BREAST IN FEMALE, ESTROGEN RECEPTOR POSITIVE (HCC): Primary | ICD-10-CM

## 2019-12-17 DIAGNOSIS — C50.411 MALIGNANT NEOPLASM OF UPPER-OUTER QUADRANT OF RIGHT BREAST IN FEMALE, ESTROGEN RECEPTOR POSITIVE (HCC): Primary | ICD-10-CM

## 2019-12-17 PROCEDURE — 99215 OFFICE O/P EST HI 40 MIN: CPT | Performed by: INTERNAL MEDICINE

## 2019-12-17 NOTE — PROGRESS NOTES
HPI   Ajit Flowers is a 47year old female who is here today for evaluation of new diagnosis of  Malignant neoplasm of upper-outer quadrant of right breast in female, estrogen receptor positive (hcc)  (primary encounter diagnosis)      Method Positive family h/o breast cancer. First degree relatives:  0  She has a Body mass index is 24.9 kg/m². Alcohol use No     No BRCA testing. Breast density category A. Prior history of breast biopsies:  Yes.    Prior history of atypical hyperplasia (du • lisinopril 10 MG Oral Tab Take 10 mg by mouth. • Insulin Aspart, w/Niacinamide, (FIASP) 100 UNIT/ML Subcutaneous Solution by Insulin pump route 4 (four) times daily.      • Insulin Aspart Pen (FIASP FLEXTOUCH) 100 UNIT/ML Subcutaneous Solution Pen-inj • COLONOSCOPY N/A 10/19/2019    Performed by Serg Vargas MD at [facility] ENDOSCOPY   • ESOPHAGOGASTRODUODENOSCOPY (EGD) N/A 10/19/2019    Performed by Serg Vargas MD at [facility] ENDOSCOPY   • ESOPHAGOGASTRODUODENOSCOPY (EGD) N/A 12/27/2018    Perf Chest: Clear to auscultation. Heart: Regular rate and rhythm. Abdomen: Soft, non tender with good bowel sounds. Insulin pump and glucose monitor in abdomen. Insulin pump and monitor in place. Extremities: No edema. Neurological: Grossly intact.    Nannette Block D/w the patient and her  that the [facility] is pending for the HER2 paolo. Discussed that based on the KI 67 and ER/AK, it will likely be negative.   However, discussed that the HER2 Paolo is significant for adjuvant therapy options, as patients with HER2 Ne Study Result     PROCEDURE:  [facility] EDI 2D+3D DIAGNOSTIC ADDL VWS  RIGHT (CPT=77065/07784) -RIGHT BREAST ULTRASOUND     COMPARISON: [facility], [facility]. [facility], [address] BREAST RIGHT LIMITED (WFO=[id number]), 12/10/2019, 13:47.   [facility], [facility]. for [place] Approved by (CST): Ashley Chauhan MD on 12/10/2019 at 15:01        PROCEDURE:  [facility] EDI 2D+3D SCRN BILAT SELF-REQUEST(HAS PCP)(CPT=77067/46848)     COMPARISON: [facility], [facility] SCREENING WITH CAD, 9/09/2009, 15:29.      INDICATION For patients over the age of 36, the target due date for the patient's next mammogram has been entered into a reminder system.        Patient received a discharge summary from the technologist after completion of exam.     Breast marker legend used on image Tumor Markers by Immunostaining (Polymer based detection method) using the ASCO/CAP scoring criteria, performed at [facility] on formalin fixed paraffin embedded tissue:      Estrogen receptor (Leica, monoclonal, clone 6 [id number]) status: Positi The specimen was collected at 8:12 a.m., on 12/12/19. Cold ischemic time is 7 minutes.  Received in pathology at 8:30 a.m., processed at 8:35 a.m., further processing at 9:30 p.m. (jq)      Melvin Lopez M.D./osei

## 2019-12-17 NOTE — PROGRESS NOTES
Breast Surgery New Patient Consultation    This is the first visit for this 47year old woman, referred by Dr. Meenakshi Martinez, who presents for evaluation of Right breast cancer.     History of Present Illness:   Ms. Earl Brown is a 47year old woman (EGD) N/A 12/27/2018    Performed by Garrett Poe MD at [place] [address]  2013    hip pinning for stress fracture   • FELISHA LOCALIZATION WIRE 1 SITE RIGHT (CPT=19281)      1994   • SPLENECTOMY  1984     Medications:    Potassium Chlorid • Other (Other) Father         polymyalgia rheumatic   • Breast Cancer Maternal Grandmother [de-identified]        post menopausal   • Genetic Disease Other 5        Neurofibromatosis   • Other (non Hodgkin's lymphoma follicular) Sister 48   • Other (epilepsy) Son or vaginal/penile discharge. Skin:    The patient denies rash, itching, skin lesions, dry skin, change in skin color or change in moles.      Hematologic/Lymphatic:  The patient denies easily bruising or bleeding or persistent swollen glands or lymph nod There is no skin dimpling with movement of the pectoralis. There is no nipple retraction. No nipple discharge can be elicited. The parenchyma is mildly nodular.  There are no dominant masses in the breast. The axillary tail is normal.  Left breast:   The sk risks and benefits of breast conservation and mastectomy (with or without reconstruction), including the fact that survival rates are equal with these two approaches.   If breast conservation is elected, I explained the need for free margins, the possibilit

## 2019-12-18 ENCOUNTER — OFFICE VISIT (OUTPATIENT)
Dept: SURGERY | Facility: CLINIC | Age: 54
End: 2019-12-18
Payer: COMMERCIAL

## 2019-12-18 VITALS
RESPIRATION RATE: 16 BRPM | SYSTOLIC BLOOD PRESSURE: 146 MMHG | DIASTOLIC BLOOD PRESSURE: 93 MMHG | BODY MASS INDEX: 23.23 KG/M2 | HEIGHT: 67 IN | HEART RATE: 72 BPM | WEIGHT: 148 LBS | OXYGEN SATURATION: 100 %

## 2019-12-18 DIAGNOSIS — C50.411 MALIGNANT NEOPLASM OF UPPER-OUTER QUADRANT OF RIGHT BREAST IN FEMALE, ESTROGEN RECEPTOR POSITIVE (HCC): Primary | ICD-10-CM

## 2019-12-18 DIAGNOSIS — Z17.0 MALIGNANT NEOPLASM OF UPPER-OUTER QUADRANT OF RIGHT BREAST IN FEMALE, ESTROGEN RECEPTOR POSITIVE (HCC): ICD-10-CM

## 2019-12-18 DIAGNOSIS — C50.411 MALIGNANT NEOPLASM OF UPPER-OUTER QUADRANT OF RIGHT BREAST IN FEMALE, ESTROGEN RECEPTOR POSITIVE (HCC): ICD-10-CM

## 2019-12-18 DIAGNOSIS — Z01.818 PREOP TESTING: Primary | ICD-10-CM

## 2019-12-18 DIAGNOSIS — Z17.0 MALIGNANT NEOPLASM OF UPPER-OUTER QUADRANT OF RIGHT BREAST IN FEMALE, ESTROGEN RECEPTOR POSITIVE (HCC): Primary | ICD-10-CM

## 2019-12-18 PROCEDURE — 99243 OFF/OP CNSLTJ NEW/EST LOW 30: CPT | Performed by: SURGERY

## 2019-12-18 NOTE — PATIENT INSTRUCTIONS
Surgeon:              Dr. Talon Harrell.  Yuly Benjamin, PhD                                          Tel:         [phone]                                  Fax:        [phone]    Surgery/Procedure:         Immediate bilateral breast reconstruction with tissue ex

## 2019-12-19 ENCOUNTER — LAB ENCOUNTER (OUTPATIENT)
Dept: LAB | Facility: HOSPITAL | Age: 54
End: 2019-12-19
Attending: ANESTHESIOLOGY
Payer: COMMERCIAL

## 2019-12-19 ENCOUNTER — TELEPHONE (OUTPATIENT)
Dept: SURGERY | Facility: CLINIC | Age: 54
End: 2019-12-19

## 2019-12-19 DIAGNOSIS — Z01.818 PREOP TESTING: ICD-10-CM

## 2019-12-19 DIAGNOSIS — D72.9 NEUTROPHILIC LEUKOCYTOSIS: ICD-10-CM

## 2019-12-19 DIAGNOSIS — D75.839 THROMBOCYTOSIS: ICD-10-CM

## 2019-12-19 DIAGNOSIS — E87.6 HYPOKALEMIA: ICD-10-CM

## 2019-12-19 DIAGNOSIS — D50.8 OTHER IRON DEFICIENCY ANEMIA: ICD-10-CM

## 2019-12-19 PROCEDURE — 85025 COMPLETE CBC W/AUTO DIFF WBC: CPT

## 2019-12-19 PROCEDURE — 84466 ASSAY OF TRANSFERRIN: CPT

## 2019-12-19 PROCEDURE — 85060 BLOOD SMEAR INTERPRETATION: CPT

## 2019-12-19 PROCEDURE — 93005 ELECTROCARDIOGRAM TRACING: CPT

## 2019-12-19 PROCEDURE — 82728 ASSAY OF FERRITIN: CPT

## 2019-12-19 PROCEDURE — 93010 ELECTROCARDIOGRAM REPORT: CPT | Performed by: PHYSICIAN ASSISTANT

## 2019-12-19 PROCEDURE — 36415 COLL VENOUS BLD VENIPUNCTURE: CPT

## 2019-12-19 PROCEDURE — 80053 COMPREHEN METABOLIC PANEL: CPT

## 2019-12-19 PROCEDURE — 83540 ASSAY OF IRON: CPT

## 2019-12-19 NOTE — TELEPHONE ENCOUNTER
Spoke to Patient regarding need for clearance for upcoming surgery on 12/23/19.  Patient stated that she will contact her Cardiologist Dr. Anne Munguia for an updated clearance and EKG as well as her PCP Dr. Vasquez Lagunas for her  [id number] with medical clearan

## 2019-12-19 NOTE — CONSULTS
New Patient Consultation    Chief Complaint:  No chief complaint on file. History of Present Illness:   Rosalind RuizTrishaSon is a 47year old female with R breast cancer.  She is planning to undergo BL mastectomy and SLNB with Dr Trenton Obrien, she is her Disp: , Rfl:   insulin glargine 100 UNIT/ML Subcutaneous Solution, Inject 7 Units into the skin nightly., Disp: , Rfl:   DULoxetine HCl 60 MG Oral Cap DR Particles, Take 60 mg by mouth nightly., Disp: , Rfl:   omeprazole 20 MG Oral Capsule Delayed Release, Smoking status: Never Smoker      Smokeless tobacco: Never Used    Substance and Sexual Activity      Alcohol use: No      Drug use: No    Other Topics      Concerns:        Caffeine Concern: Yes          coffee-1 cup/day        Pt has a pacemaker: No change in skin color or change in moles, sunburns, or sunburns with blistering. Hematologic/Lymphatic:  The patient denies easily bruising or bleeding, persistent swollen glands or lymph nodes, bleeding disorders, blood clots, or pulmonary embolism.    Gy deformities        Impression:   Meenakshi Esposito Ugo  is a 47year old with right-sided breast cancer    Discussion and Plan:  The patient was counseled on the different treatment options.      We reviewed the options for post-mastectomy reconstruction thromboembolism  reviewed.  The expected post-operative course was discussed including the need for activity limitation, drains, and suture removal. We reviewed the impact of post-mastectomy radiation therapy on flap-based reconstruction (should it be deeme

## 2019-12-20 RX ORDER — CLINDAMYCIN PHOSPHATE 900 MG/50ML
900 INJECTION INTRAVENOUS ONCE
Status: CANCELLED | OUTPATIENT
Start: 2019-12-20 | End: 2019-12-20

## 2019-12-23 ENCOUNTER — ANESTHESIA EVENT (OUTPATIENT)
Dept: SURGERY | Facility: HOSPITAL | Age: 54
End: 2019-12-23
Payer: COMMERCIAL

## 2019-12-23 ENCOUNTER — HOSPITAL ENCOUNTER (OUTPATIENT)
Dept: NUCLEAR MEDICINE | Facility: HOSPITAL | Age: 54
Discharge: HOME | End: 2019-12-23
Attending: SURGERY
Payer: COMMERCIAL

## 2019-12-23 ENCOUNTER — HOSPITAL ENCOUNTER (OUTPATIENT)
Facility: HOSPITAL | Age: 54
Discharge: HOME | End: 2019-12-24
Attending: SURGERY | Admitting: SURGERY
Payer: COMMERCIAL

## 2019-12-23 ENCOUNTER — ANESTHESIA (OUTPATIENT)
Dept: SURGERY | Facility: HOSPITAL | Age: 54
End: 2019-12-23
Payer: COMMERCIAL

## 2019-12-23 DIAGNOSIS — Z17.0 MALIGNANT NEOPLASM OF UPPER-OUTER QUADRANT OF RIGHT BREAST IN FEMALE, ESTROGEN RECEPTOR POSITIVE (HCC): ICD-10-CM

## 2019-12-23 DIAGNOSIS — C50.411 MALIGNANT NEOPLASM OF UPPER-OUTER QUADRANT OF RIGHT BREAST IN FEMALE, ESTROGEN RECEPTOR POSITIVE (HCC): ICD-10-CM

## 2019-12-23 PROBLEM — I10 ESSENTIAL HYPERTENSION: Chronic | Status: ACTIVE | Noted: 2019-12-23

## 2019-12-23 PROBLEM — Z79.4 INSULIN DEPENDENT TYPE 2 DIABETES MELLITUS (HCC): Chronic | Status: ACTIVE | Noted: 2019-12-23

## 2019-12-23 PROBLEM — E11.9 INSULIN DEPENDENT TYPE 2 DIABETES MELLITUS (HCC): Chronic | Status: ACTIVE | Noted: 2019-12-23

## 2019-12-23 PROCEDURE — 99214 OFFICE O/P EST MOD 30 MIN: CPT | Performed by: HOSPITALIST

## 2019-12-23 PROCEDURE — 3E0W3KZ INTRODUCTION OF OTHER DIAGNOSTIC SUBSTANCE INTO LYMPHATICS, PERCUTANEOUS APPROACH: ICD-10-PCS | Performed by: SURGERY

## 2019-12-23 PROCEDURE — 0HTV0ZZ RESECTION OF BILATERAL BREAST, OPEN APPROACH: ICD-10-PCS | Performed by: SURGERY

## 2019-12-23 PROCEDURE — 07B50ZX EXCISION OF RIGHT AXILLARY LYMPHATIC, OPEN APPROACH, DIAGNOSTIC: ICD-10-PCS | Performed by: SURGERY

## 2019-12-23 PROCEDURE — 0HUV7KZ SUPPLEMENT BILATERAL BREAST WITH NONAUTOLOGOUS TISSUE SUBSTITUTE, VIA NATURAL OR ARTIFICIAL OPENING: ICD-10-PCS | Performed by: SURGERY

## 2019-12-23 PROCEDURE — 78195 LYMPH SYSTEM IMAGING: CPT | Performed by: SURGERY

## 2019-12-23 PROCEDURE — 0HHV0NZ INSERTION OF TISSUE EXPANDER INTO BILATERAL BREAST, OPEN APPROACH: ICD-10-PCS | Performed by: SURGERY

## 2019-12-23 DEVICE — GRAFT ALLODERM CONTOUR MED PRF: Type: IMPLANTABLE DEVICE | Site: BREAST | Status: FUNCTIONAL

## 2019-12-23 RX ORDER — SODIUM CHLORIDE, SODIUM LACTATE, POTASSIUM CHLORIDE, CALCIUM CHLORIDE 600; 310; 30; 20 MG/100ML; MG/100ML; MG/100ML; MG/100ML
INJECTION, SOLUTION INTRAVENOUS CONTINUOUS
Status: DISCONTINUED | OUTPATIENT
Start: 2019-12-23 | End: 2019-12-23

## 2019-12-23 RX ORDER — MORPHINE SULFATE 4 MG/ML
4 INJECTION, SOLUTION INTRAMUSCULAR; INTRAVENOUS EVERY 2 HOUR PRN
Status: DISCONTINUED | OUTPATIENT
Start: 2019-12-23 | End: 2019-12-24

## 2019-12-23 RX ORDER — HYDROCODONE BITARTRATE AND ACETAMINOPHEN 5; 325 MG/1; MG/1
1 TABLET ORAL AS NEEDED
Status: DISCONTINUED | OUTPATIENT
Start: 2019-12-23 | End: 2019-12-23 | Stop reason: HOSPADM

## 2019-12-23 RX ORDER — DILTIAZEM HYDROCHLORIDE 180 MG/1
360 CAPSULE, EXTENDED RELEASE ORAL DAILY
Status: DISCONTINUED | OUTPATIENT
Start: 2019-12-24 | End: 2019-12-24

## 2019-12-23 RX ORDER — METOCLOPRAMIDE HYDROCHLORIDE 5 MG/ML
10 INJECTION INTRAMUSCULAR; INTRAVENOUS EVERY 6 HOURS PRN
Status: DISCONTINUED | OUTPATIENT
Start: 2019-12-23 | End: 2019-12-24

## 2019-12-23 RX ORDER — DOCUSATE SODIUM 100 MG/1
100 CAPSULE, LIQUID FILLED ORAL 2 TIMES DAILY
Qty: 40 CAPSULE | Refills: 0 | Status: SHIPPED | OUTPATIENT
Start: 2019-12-23 | End: 2020-01-14 | Stop reason: ALTCHOICE

## 2019-12-23 RX ORDER — HYDROCODONE BITARTRATE AND ACETAMINOPHEN 5; 325 MG/1; MG/1
2 TABLET ORAL EVERY 6 HOURS PRN
Status: DISCONTINUED | OUTPATIENT
Start: 2019-12-23 | End: 2019-12-24

## 2019-12-23 RX ORDER — ACETAMINOPHEN 325 MG/1
650 TABLET ORAL EVERY 6 HOURS PRN
Status: DISCONTINUED | OUTPATIENT
Start: 2019-12-23 | End: 2019-12-24

## 2019-12-23 RX ORDER — DIPHENHYDRAMINE HYDROCHLORIDE 50 MG/ML
25 INJECTION INTRAMUSCULAR; INTRAVENOUS EVERY 6 HOURS PRN
Status: DISCONTINUED | OUTPATIENT
Start: 2019-12-23 | End: 2019-12-24

## 2019-12-23 RX ORDER — DIAZEPAM 2 MG/1
2 TABLET ORAL NIGHTLY PRN
Status: DISCONTINUED | OUTPATIENT
Start: 2019-12-23 | End: 2019-12-24

## 2019-12-23 RX ORDER — MORPHINE SULFATE 4 MG/ML
6 INJECTION, SOLUTION INTRAMUSCULAR; INTRAVENOUS EVERY 2 HOUR PRN
Status: DISCONTINUED | OUTPATIENT
Start: 2019-12-23 | End: 2019-12-24

## 2019-12-23 RX ORDER — GLYCOPYRROLATE 0.2 MG/ML
INJECTION INTRAMUSCULAR; INTRAVENOUS AS NEEDED
Status: DISCONTINUED | OUTPATIENT
Start: 2019-12-23 | End: 2019-12-23 | Stop reason: SURG

## 2019-12-23 RX ORDER — DEXTROSE MONOHYDRATE 25 G/50ML
50 INJECTION, SOLUTION INTRAVENOUS
Status: DISCONTINUED | OUTPATIENT
Start: 2019-12-23 | End: 2019-12-23 | Stop reason: HOSPADM

## 2019-12-23 RX ORDER — HYDROCODONE BITARTRATE AND ACETAMINOPHEN 5; 325 MG/1; MG/1
2 TABLET ORAL AS NEEDED
Status: DISCONTINUED | OUTPATIENT
Start: 2019-12-23 | End: 2019-12-23 | Stop reason: HOSPADM

## 2019-12-23 RX ORDER — PANTOPRAZOLE SODIUM 20 MG/1
20 TABLET, DELAYED RELEASE ORAL
Status: DISCONTINUED | OUTPATIENT
Start: 2019-12-24 | End: 2019-12-23

## 2019-12-23 RX ORDER — DILTIAZEM HYDROCHLORIDE 180 MG/1
180 CAPSULE, EXTENDED RELEASE ORAL DAILY
Status: DISCONTINUED | OUTPATIENT
Start: 2019-12-24 | End: 2019-12-23

## 2019-12-23 RX ORDER — MORPHINE SULFATE 4 MG/ML
2 INJECTION, SOLUTION INTRAMUSCULAR; INTRAVENOUS EVERY 10 MIN PRN
Status: DISCONTINUED | OUTPATIENT
Start: 2019-12-23 | End: 2019-12-23 | Stop reason: HOSPADM

## 2019-12-23 RX ORDER — ONDANSETRON 4 MG/1
4 TABLET, FILM COATED ORAL EVERY 8 HOURS PRN
Qty: 20 TABLET | Refills: 1 | Status: SHIPPED | OUTPATIENT
Start: 2019-12-23 | End: 2020-01-14 | Stop reason: ALTCHOICE

## 2019-12-23 RX ORDER — HALOPERIDOL 5 MG/ML
0.25 INJECTION INTRAMUSCULAR ONCE AS NEEDED
Status: DISCONTINUED | OUTPATIENT
Start: 2019-12-23 | End: 2019-12-23 | Stop reason: HOSPADM

## 2019-12-23 RX ORDER — CLINDAMYCIN PHOSPHATE 150 MG/ML
INJECTION, SOLUTION INTRAVENOUS AS NEEDED
Status: DISCONTINUED | OUTPATIENT
Start: 2019-12-23 | End: 2019-12-23 | Stop reason: SURG

## 2019-12-23 RX ORDER — PROCHLORPERAZINE EDISYLATE 5 MG/ML
5 INJECTION INTRAMUSCULAR; INTRAVENOUS ONCE AS NEEDED
Status: COMPLETED | OUTPATIENT
Start: 2019-12-23 | End: 2019-12-23

## 2019-12-23 RX ORDER — EPHEDRINE SULFATE 50 MG/ML
INJECTION, SOLUTION INTRAVENOUS AS NEEDED
Status: DISCONTINUED | OUTPATIENT
Start: 2019-12-23 | End: 2019-12-23 | Stop reason: SURG

## 2019-12-23 RX ORDER — ENOXAPARIN SODIUM 100 MG/ML
40 INJECTION SUBCUTANEOUS DAILY
Status: DISCONTINUED | OUTPATIENT
Start: 2019-12-24 | End: 2019-12-24

## 2019-12-23 RX ORDER — FAMOTIDINE 20 MG/1
20 TABLET ORAL ONCE
Status: DISCONTINUED | OUTPATIENT
Start: 2019-12-23 | End: 2019-12-23 | Stop reason: HOSPADM

## 2019-12-23 RX ORDER — CLINDAMYCIN PHOSPHATE 900 MG/50ML
900 INJECTION INTRAVENOUS ONCE
Status: DISCONTINUED | OUTPATIENT
Start: 2019-12-23 | End: 2019-12-23 | Stop reason: HOSPADM

## 2019-12-23 RX ORDER — ROCURONIUM BROMIDE 10 MG/ML
INJECTION, SOLUTION INTRAVENOUS AS NEEDED
Status: DISCONTINUED | OUTPATIENT
Start: 2019-12-23 | End: 2019-12-23 | Stop reason: SURG

## 2019-12-23 RX ORDER — ACETAMINOPHEN 500 MG
1000 TABLET ORAL ONCE
Status: COMPLETED | OUTPATIENT
Start: 2019-12-23 | End: 2019-12-23

## 2019-12-23 RX ORDER — HYDROCODONE BITARTRATE AND ACETAMINOPHEN 5; 325 MG/1; MG/1
1 TABLET ORAL EVERY 6 HOURS PRN
Status: DISCONTINUED | OUTPATIENT
Start: 2019-12-23 | End: 2019-12-24

## 2019-12-23 RX ORDER — CLINDAMYCIN HYDROCHLORIDE 300 MG/1
300 CAPSULE ORAL 3 TIMES DAILY
Qty: 30 CAPSULE | Refills: 1 | Status: SHIPPED | OUTPATIENT
Start: 2019-12-23 | End: 2020-01-02

## 2019-12-23 RX ORDER — ONDANSETRON 4 MG/1
4 TABLET, FILM COATED ORAL EVERY 8 HOURS PRN
Status: DISCONTINUED | OUTPATIENT
Start: 2019-12-23 | End: 2019-12-24

## 2019-12-23 RX ORDER — LIDOCAINE HYDROCHLORIDE 40 MG/ML
SOLUTION TOPICAL AS NEEDED
Status: DISCONTINUED | OUTPATIENT
Start: 2019-12-23 | End: 2019-12-23 | Stop reason: SURG

## 2019-12-23 RX ORDER — DOCUSATE SODIUM 100 MG/1
100 CAPSULE, LIQUID FILLED ORAL 2 TIMES DAILY
Status: DISCONTINUED | OUTPATIENT
Start: 2019-12-24 | End: 2019-12-24

## 2019-12-23 RX ORDER — HYDROMORPHONE HYDROCHLORIDE 1 MG/ML
0.6 INJECTION, SOLUTION INTRAMUSCULAR; INTRAVENOUS; SUBCUTANEOUS EVERY 5 MIN PRN
Status: DISCONTINUED | OUTPATIENT
Start: 2019-12-23 | End: 2019-12-23 | Stop reason: HOSPADM

## 2019-12-23 RX ORDER — PANTOPRAZOLE SODIUM 40 MG/1
40 TABLET, DELAYED RELEASE ORAL EVERY 24 HOURS
Status: DISCONTINUED | OUTPATIENT
Start: 2019-12-23 | End: 2019-12-24

## 2019-12-23 RX ORDER — ONDANSETRON 2 MG/ML
4 INJECTION INTRAMUSCULAR; INTRAVENOUS EVERY 6 HOURS PRN
Status: DISCONTINUED | OUTPATIENT
Start: 2019-12-23 | End: 2019-12-23

## 2019-12-23 RX ORDER — DEXTROSE AND SODIUM CHLORIDE 5; .45 G/100ML; G/100ML
INJECTION, SOLUTION INTRAVENOUS CONTINUOUS
Status: DISCONTINUED | OUTPATIENT
Start: 2019-12-23 | End: 2019-12-24

## 2019-12-23 RX ORDER — HYDROMORPHONE HYDROCHLORIDE 1 MG/ML
0.4 INJECTION, SOLUTION INTRAMUSCULAR; INTRAVENOUS; SUBCUTANEOUS EVERY 5 MIN PRN
Status: DISCONTINUED | OUTPATIENT
Start: 2019-12-23 | End: 2019-12-23 | Stop reason: HOSPADM

## 2019-12-23 RX ORDER — HYDROCODONE BITARTRATE AND ACETAMINOPHEN 5; 325 MG/1; MG/1
1-2 TABLET ORAL
Qty: 40 TABLET | Refills: 0 | Status: SHIPPED | OUTPATIENT
Start: 2019-12-23 | End: 2020-01-14 | Stop reason: ALTCHOICE

## 2019-12-23 RX ORDER — CLINDAMYCIN PHOSPHATE 900 MG/50ML
900 INJECTION INTRAVENOUS EVERY 8 HOURS
Status: COMPLETED | OUTPATIENT
Start: 2019-12-23 | End: 2019-12-24

## 2019-12-23 RX ORDER — MORPHINE SULFATE 4 MG/ML
4 INJECTION, SOLUTION INTRAMUSCULAR; INTRAVENOUS EVERY 10 MIN PRN
Status: DISCONTINUED | OUTPATIENT
Start: 2019-12-23 | End: 2019-12-23 | Stop reason: HOSPADM

## 2019-12-23 RX ORDER — DULOXETIN HYDROCHLORIDE 30 MG/1
60 CAPSULE, DELAYED RELEASE ORAL NIGHTLY
Status: DISCONTINUED | OUTPATIENT
Start: 2019-12-23 | End: 2019-12-24

## 2019-12-23 RX ORDER — LIDOCAINE HYDROCHLORIDE 10 MG/ML
INJECTION, SOLUTION EPIDURAL; INFILTRATION; INTRACAUDAL; PERINEURAL AS NEEDED
Status: DISCONTINUED | OUTPATIENT
Start: 2019-12-23 | End: 2019-12-23 | Stop reason: SURG

## 2019-12-23 RX ORDER — HYDROMORPHONE HYDROCHLORIDE 1 MG/ML
0.2 INJECTION, SOLUTION INTRAMUSCULAR; INTRAVENOUS; SUBCUTANEOUS EVERY 5 MIN PRN
Status: DISCONTINUED | OUTPATIENT
Start: 2019-12-23 | End: 2019-12-23 | Stop reason: HOSPADM

## 2019-12-23 RX ORDER — ONDANSETRON 2 MG/ML
4 INJECTION INTRAMUSCULAR; INTRAVENOUS ONCE AS NEEDED
Status: COMPLETED | OUTPATIENT
Start: 2019-12-23 | End: 2019-12-23

## 2019-12-23 RX ORDER — ONDANSETRON 2 MG/ML
4 INJECTION INTRAMUSCULAR; INTRAVENOUS EVERY 8 HOURS PRN
Status: DISCONTINUED | OUTPATIENT
Start: 2019-12-23 | End: 2019-12-24

## 2019-12-23 RX ORDER — METHYLENE BLUE 10 MG/ML
INJECTION INTRAVENOUS AS NEEDED
Status: DISCONTINUED | OUTPATIENT
Start: 2019-12-23 | End: 2019-12-23 | Stop reason: HOSPADM

## 2019-12-23 RX ORDER — MORPHINE SULFATE 4 MG/ML
2 INJECTION, SOLUTION INTRAMUSCULAR; INTRAVENOUS EVERY 2 HOUR PRN
Status: DISCONTINUED | OUTPATIENT
Start: 2019-12-23 | End: 2019-12-24

## 2019-12-23 RX ORDER — MORPHINE SULFATE 10 MG/ML
6 INJECTION, SOLUTION INTRAMUSCULAR; INTRAVENOUS EVERY 10 MIN PRN
Status: DISCONTINUED | OUTPATIENT
Start: 2019-12-23 | End: 2019-12-23 | Stop reason: HOSPADM

## 2019-12-23 RX ORDER — DEXAMETHASONE SODIUM PHOSPHATE 4 MG/ML
VIAL (ML) INJECTION AS NEEDED
Status: DISCONTINUED | OUTPATIENT
Start: 2019-12-23 | End: 2019-12-23 | Stop reason: SURG

## 2019-12-23 RX ORDER — ONDANSETRON 2 MG/ML
INJECTION INTRAMUSCULAR; INTRAVENOUS AS NEEDED
Status: DISCONTINUED | OUTPATIENT
Start: 2019-12-23 | End: 2019-12-23 | Stop reason: SURG

## 2019-12-23 RX ORDER — NALOXONE HYDROCHLORIDE 0.4 MG/ML
80 INJECTION, SOLUTION INTRAMUSCULAR; INTRAVENOUS; SUBCUTANEOUS AS NEEDED
Status: DISCONTINUED | OUTPATIENT
Start: 2019-12-23 | End: 2019-12-23 | Stop reason: HOSPADM

## 2019-12-23 RX ORDER — LISINOPRIL 10 MG/1
10 TABLET ORAL DAILY
Status: DISCONTINUED | OUTPATIENT
Start: 2019-12-24 | End: 2019-12-24

## 2019-12-23 RX ORDER — SCOLOPAMINE TRANSDERMAL SYSTEM 1 MG/1
1 PATCH, EXTENDED RELEASE TRANSDERMAL
Status: DISCONTINUED | OUTPATIENT
Start: 2019-12-23 | End: 2019-12-23 | Stop reason: HOSPADM

## 2019-12-23 RX ORDER — NEOSTIGMINE METHYLSULFATE 0.5 MG/ML
INJECTION INTRAVENOUS AS NEEDED
Status: DISCONTINUED | OUTPATIENT
Start: 2019-12-23 | End: 2019-12-23 | Stop reason: SURG

## 2019-12-23 RX ORDER — SODIUM CHLORIDE, SODIUM LACTATE, POTASSIUM CHLORIDE, CALCIUM CHLORIDE 600; 310; 30; 20 MG/100ML; MG/100ML; MG/100ML; MG/100ML
INJECTION, SOLUTION INTRAVENOUS CONTINUOUS
Status: DISCONTINUED | OUTPATIENT
Start: 2019-12-23 | End: 2019-12-23 | Stop reason: HOSPADM

## 2019-12-23 RX ORDER — MIDAZOLAM HYDROCHLORIDE 1 MG/ML
INJECTION INTRAMUSCULAR; INTRAVENOUS AS NEEDED
Status: DISCONTINUED | OUTPATIENT
Start: 2019-12-23 | End: 2019-12-23 | Stop reason: SURG

## 2019-12-23 RX ORDER — DEXTROSE MONOHYDRATE 25 G/50ML
50 INJECTION, SOLUTION INTRAVENOUS AS NEEDED
Status: DISCONTINUED | OUTPATIENT
Start: 2019-12-23 | End: 2019-12-24

## 2019-12-23 RX ADMIN — LIDOCAINE HYDROCHLORIDE 4 ML: 40 SOLUTION TOPICAL at 11:58:00

## 2019-12-23 RX ADMIN — LIDOCAINE HYDROCHLORIDE 25 MG: 10 INJECTION, SOLUTION EPIDURAL; INFILTRATION; INTRACAUDAL; PERINEURAL at 11:58:00

## 2019-12-23 RX ADMIN — SODIUM CHLORIDE, SODIUM LACTATE, POTASSIUM CHLORIDE, CALCIUM CHLORIDE: 600; 310; 30; 20 INJECTION, SOLUTION INTRAVENOUS at 16:40:00

## 2019-12-23 RX ADMIN — SODIUM CHLORIDE, SODIUM LACTATE, POTASSIUM CHLORIDE, CALCIUM CHLORIDE: 600; 310; 30; 20 INJECTION, SOLUTION INTRAVENOUS at 11:58:00

## 2019-12-23 RX ADMIN — ONDANSETRON 4 MG: 2 INJECTION INTRAMUSCULAR; INTRAVENOUS at 11:58:00

## 2019-12-23 RX ADMIN — NEOSTIGMINE METHYLSULFATE 2 MG: 0.5 INJECTION INTRAVENOUS at 16:29:00

## 2019-12-23 RX ADMIN — ROCURONIUM BROMIDE 20 MG: 10 INJECTION, SOLUTION INTRAVENOUS at 13:37:00

## 2019-12-23 RX ADMIN — MIDAZOLAM HYDROCHLORIDE 2 MG: 1 INJECTION INTRAMUSCULAR; INTRAVENOUS at 11:58:00

## 2019-12-23 RX ADMIN — EPHEDRINE SULFATE 5 MG: 50 INJECTION, SOLUTION INTRAVENOUS at 12:21:00

## 2019-12-23 RX ADMIN — CLINDAMYCIN PHOSPHATE 900 MG: 150 INJECTION, SOLUTION INTRAVENOUS at 11:58:00

## 2019-12-23 RX ADMIN — GLYCOPYRROLATE 0.4 MG: 0.2 INJECTION INTRAMUSCULAR; INTRAVENOUS at 16:29:00

## 2019-12-23 RX ADMIN — EPHEDRINE SULFATE 5 MG: 50 INJECTION, SOLUTION INTRAVENOUS at 14:56:00

## 2019-12-23 RX ADMIN — DEXAMETHASONE SODIUM PHOSPHATE 4 MG: 4 MG/ML VIAL (ML) INJECTION at 11:58:00

## 2019-12-23 RX ADMIN — EPHEDRINE SULFATE 5 MG: 50 INJECTION, SOLUTION INTRAVENOUS at 13:09:00

## 2019-12-23 RX ADMIN — Medication 0.2 MG: at 12:17:00

## 2019-12-23 RX ADMIN — EPHEDRINE SULFATE 5 MG: 50 INJECTION, SOLUTION INTRAVENOUS at 15:09:00

## 2019-12-23 RX ADMIN — ONDANSETRON 4 MG: 2 INJECTION INTRAMUSCULAR; INTRAVENOUS at 16:29:00

## 2019-12-23 RX ADMIN — SODIUM CHLORIDE, SODIUM LACTATE, POTASSIUM CHLORIDE, CALCIUM CHLORIDE: 600; 310; 30; 20 INJECTION, SOLUTION INTRAVENOUS at 13:42:00

## 2019-12-23 NOTE — PROGRESS NOTES
[facility]    Progress Note    Padmini Mis Patient Status:  Outpatient in a Bed    1965 MRN [id number]   Location [address] [facility] Attending Rodrigo Moon MD   Hosp Day # 0 PCP Antonio Mayes lymph node biopsy with Right breast lymphoscintigraphy, [place]), Immediate bilateral breast reconstruction with tissue expander placement, with acellular dermal matrix Awais Avila), PAIN CONTROL, MONITOR DRAINS, DVT PROPHYLAXIS, PATHOLOGY PENDING.        Nate

## 2019-12-23 NOTE — ANESTHESIA PREPROCEDURE EVALUATION
Anesthesia PreOp Note    HPI:     Rosalind Kuhn is a 47year old female who presents for preoperative consultation requested by: Summer Fisher MD    Date of Surgery: 12/23/2019    Procedure(s):  BREAST SENTINEL LYMPH NODE BIOPSY  BREAST MAS procedure 1984   • [facility] 1997   • Congestive heart disease ([facility]    • Essential hypertension    • High blood pressure    • Hodgkin's disease ([facility] 1984    3B-chemo   • Neuropathy 1984   • Pancreatic insufficiency    • Personal history of anti meals.  , Disp: , Rfl: , 12/23/2019 at 0400  CARTIA  MG Oral Capsule SR 24 Hr, TAKE TWO CAPSULES BY MOUTH IN THE MORNING , Disp: 60 capsule, Rfl: 3, 12/23/2019 at 0400  cholecalciferol 1000 UNITS Oral Cap, Take 1,000 Units by mouth daily.   , Disp: , file      Years of education: Not on file      Highest education level: Not on file    Occupational History      Occupation:     Social Needs      Financial resource strain: Not on file      Food insecurity:        Worry: Not on file        ESPOO No    Social History Narrative      Not on file      Available pre-op labs reviewed.   Lab Results   Component Value Date    WBC 11.0 12/19/2019    RBC 4.23 12/19/2019    HGB 12.7 12/19/2019    HCT 37.5 12/19/2019    MCV 88.7 12/19/2019    MCH 30.0 12/19/20 best of my ability. The patient desires the anesthetic management as planned.   Davie Luo  12/23/2019 10:03 AM

## 2019-12-23 NOTE — H&P
History of Present Illness:   Ms. Mahsa Duron is a 47year old woman who presents with imaging detected right breast cancer. She denies any palpable masses, nipple discharge, skin changes or axillary symptoms.   She has no personal prior histo LOCALIZATION WIRE 1 SITE RIGHT (CPT=19281)         1994   • SPLENECTOMY   1984      Medications:    Potassium Chloride ER 20 MEQ Oral Tab CR, Take 40 mEq by mouth 2 (two) times daily.   , Disp: , Rfl:   lisinopril 10 MG Oral Tab, Take 10 mg by mouth., Disp: • Genetic Disease Other 5         Neurofibromatosis   • Other (non Hodgkin's lymphoma follicular) Sister 48   • Other (epilepsy) Son     • No Known Problems Daughter     • Cancer Self           hodgkins 1994   • Diabetes Neg        Social History:     Al skin, change in skin color or change in moles.      Hematologic/Lymphatic:  The patient denies easily bruising or bleeding or persistent swollen glands or lymph nodes.      Musculoskeletal:  The patient denies muscle aches/pain, joint pain, stiff joints, n retraction. No nipple discharge can be elicited. The parenchyma is mildly nodular.  There are no dominant masses in the breast. The axillary tail is normal.  Left breast:   The skin, nipple, and areola appear normal. There is no skin dimpling with movement without reconstruction), including the fact that survival rates are equal with these two approaches.   If breast conservation is elected, I explained the need for free margins, the possibility of re-excision to achieve free margins, and the need for post-op likelihood of the patient achieving goals; and potential problems that might occur during recuperation.   I discussed reasonable alternatives to the procedure, including risks, benefits and side effects related to the alternatives and risks related to not r

## 2019-12-23 NOTE — ANESTHESIA POSTPROCEDURE EVALUATION
Patient: Laneta Alpers    Procedure Summary     Date:  12/23/19 Room / Location:  [address] [place] [address] OR 08 / [address] [place] [address] OR    Anesthesia Start:  5670 Anesthesia Stop:  1901    Procedures:       BREAST SENTINEL LYMPH NODE BIOPSY (Right )      BREAST MASTECTO

## 2019-12-23 NOTE — BRIEF OP NOTE
Pre-Operative Diagnosis: Malignant neoplasm of upper-outer quadrant of right breast in female, estrogen receptor positive [facility] [C50.411, Z17.0]     Post-Operative Diagnosis: Malignant neoplasm of upper-outer quadrant of right breast in female, estrogen re

## 2019-12-23 NOTE — ANESTHESIA PROCEDURE NOTES
Airway  Date/Time: 12/23/2019 11:59 AM  Urgency: Elective    Airway not difficult    General Information and Staff    Patient location during procedure: OR  Anesthesiologist: Nicholas Anglin MD  Performed: anesthesiologist     Indications and Patient Daryl

## 2019-12-24 VITALS
DIASTOLIC BLOOD PRESSURE: 82 MMHG | BODY MASS INDEX: 23.7 KG/M2 | WEIGHT: 151 LBS | SYSTOLIC BLOOD PRESSURE: 125 MMHG | RESPIRATION RATE: 20 BRPM | OXYGEN SATURATION: 98 % | TEMPERATURE: 99 F | HEART RATE: 97 BPM | HEIGHT: 67 IN

## 2019-12-24 PROCEDURE — 99217 OBSERVATION CARE DISCHARGE: CPT | Performed by: HOSPITALIST

## 2019-12-24 RX ORDER — HYDROCODONE BITARTRATE AND ACETAMINOPHEN 5; 325 MG/1; MG/1
2 TABLET ORAL EVERY 4 HOURS PRN
Status: DISCONTINUED | OUTPATIENT
Start: 2019-12-24 | End: 2019-12-24

## 2019-12-24 RX ORDER — HYDROCODONE BITARTRATE AND ACETAMINOPHEN 5; 325 MG/1; MG/1
1 TABLET ORAL EVERY 4 HOURS PRN
Status: DISCONTINUED | OUTPATIENT
Start: 2019-12-24 | End: 2019-12-24

## 2019-12-24 NOTE — PLAN OF CARE
Pt admitted to 4se. ACCU check stable at this time. Dr. Pfeiffer Case on call for Dr. Vickii Felty. MD paged. Orders to be obtained. Pt does not have CGM on. VS stable. No pain interventions given. Bilateral JPs drained 30 ccs.

## 2019-12-24 NOTE — CONSULTS
[facility]    Report of Endocrinology Consultation  [facility]    Maryjane Carty Patient Status:  Outpatient in a Bed    1965 MRN [id number]   Location [facility] 4W/SW/SE Attending Ana Prescott, ([facility]     Type 1-C   • Visual impairment     WEARS GLASSES/CONTACT     Past Surgical History:   Procedure Laterality Date   • APPENDECTOMY  1992   • AXILLARY NODE DISSECTION Right 12/23/2019    Performed by Mala Myrick MD at [facility] OR   • BREAST M SORES    Medications:    Current Facility-Administered Medications:   •  influenza vaccine split quad (FLULAVAL) ages 6 months to 64 years inj 0.5ml, 0.5 mL, Intramuscular, Prior to discharge  •  pancrelipase (Lip-Prot-Amyl) (ZENPEP) DR particles cap 75,00 50 mL, 50 mL, Intravenous, PRN  •  Insulin Aspart Pen (NOVOLOG) 100 UNIT/ML flexpen 1-5 Units, 1-5 Units, Subcutaneous, TID CC  •  Insulin Aspart Pen (NOVOLOG) 100 UNIT/ML flexpen 3 Units, 3 Units, Subcutaneous, TID CC  •  insulin detemir (LEVEMIR) 100 UNI

## 2019-12-24 NOTE — PROGRESS NOTES
[facility]  Progress Note    Faustino Ahn Patient Status:  Outpatient in a Bed    1965 MRN [id number]   Location [facility] 4W/SW/SE Attending Sandford Duane, MD   Hosp Day # 0 PCP León Arce MD     Subjective:  Imer Casarez

## 2019-12-24 NOTE — PLAN OF CARE
Ursula Justin is aware of POC at this time. Discharge information given to patient and  present in room. She knows to follow up with Makenzie Guillen- breast navigator if she has any questions. All prescriptions filled at [address] VS stable.  IV rem schedule  Outcome: Adequate for Discharge     Problem: DISCHARGE PLANNING  Goal: Discharge to home or other facility with appropriate resources  Description  INTERVENTIONS:  - Identify barriers to discharge w/pt and caregiver  - Include patient/family/disc

## 2019-12-24 NOTE — OPERATIVE REPORT
[facility]    PATIENT'S NAME: Powell Hope, [facility]   ATTENDING PHYSICIAN: Paul Diaz MD   OPERATING PHYSICIAN: Reinier Lobo.  Sofia Shone, MD   PATIENT ACCOUNT#:   [id number]    LOCATION:  [address] [place] [address] #:   [id number]       DA efforts in the order to maintain adequate skin for fusion for the reconstruction.   The risks and possible complications were discussed with the patient including, but not limited to, infection, bleeding, injury to surrounding structures, possible need for Electrocautery was used for hemostasis. Sharp dissection electrocautery was then used to raise the mastectomy flaps, the borders of the clavicle, sternum, inframammary fold, and latissimus tendon laterally.   The right breast was then dissected off the und

## 2019-12-24 NOTE — PLAN OF CARE
Patient is alert and oriented. Pain managed with morphine and Tylenol PRN. IVF infusing. Dr. Pedro Rascon placed on consult this evening for hx of type 1 diabetes. Levemir and sliding scale added to MAR. Accuchecks completed @ 0000 and 0300 per order.  IVF infusin appropriate  - Consider OT/PT consult to assist with strengthening/mobility  - Encourage toileting schedule  Outcome: Progressing     Problem: DISCHARGE PLANNING  Goal: Discharge to home or other facility with appropriate resources  Description  INTERVENTI

## 2019-12-24 NOTE — OPERATIVE REPORT
[facility]    PATIENT'S NAME: Js Irizarry HASMUKH   ATTENDING PHYSICIAN: John Vizcaino MD   OPERATING PHYSICIAN: Florence Garcia MD   PATIENT ACCOUNT#:   [id number]    LOCATION:  [address] [place] [address] #:   [id number]       DATE OF lymph node biopsy as well as the bilateral mastectomy. I was called into the room after Dr. Ifeoma Figueredo completed the first site, and I started the reconstruction.   This was started with the right breast.  The mastectomy skin flaps were evaluated and the dann antibiotic solution and hemostasis was assured. Then the 550 mL [place] smooth medium height tissue expander was deflated and wrapped anteriorly with ADM.   The AlloDerm used, again, was a medium perforated thick piece of AlloDerm for the anterior coverage

## 2019-12-24 NOTE — DISCHARGE SUMMARY
[facility]    Discharge Summary    [address] Patient Status:  Outpatient in a Bed    1965 MRN [id number]   Location [facility] 4W/SW/SE Attending Daniel Garcia MD   Hosp Day # 0 PCP Aleks Flower MD     Da edema  Psychiatric: calm        History of Present Illness:   Per Dr. Dulce Barron a 47year old woman who presents with imaging detected right breast cancer.  She denies any palpable masses, nipple discharge, skin changes or axi n/a    Discharge Condition: Good    Discharge Medications:      Discharge Medications      START taking these medications      Instructions Prescription details   Clindamycin HCl 300 MG Caps  Commonly known as:  CLEOCIN      Take 1 capsule (300 mg total) b into the skin 3 (three) times daily before meals. Sliding scale based on carb intake at meals   Refills:  0     insulin glargine 100 UNIT/ML Soln  Commonly known as:  LANTUS      Inject 7 Units into the skin nightly.    Refills:  0     lisinopril 10 MG Tabs

## 2019-12-26 ENCOUNTER — TELEPHONE (OUTPATIENT)
Dept: SURGERY | Facility: CLINIC | Age: 54
End: 2019-12-26

## 2019-12-26 NOTE — TELEPHONE ENCOUNTER
MICHELEM for patient to see how she is doing after her procedure. Wanted to see how she is doing with drain care, and pain management. Asked her to call back so we can arrange a post op visit with Lupe Victoria as well.  Possibly no the 3rd when she is seeing Darya Morales

## 2019-12-27 ENCOUNTER — NURSE NAVIGATOR ENCOUNTER (OUTPATIENT)
Dept: HEMATOLOGY/ONCOLOGY | Facility: HOSPITAL | Age: 54
End: 2019-12-27

## 2020-01-03 ENCOUNTER — OFFICE VISIT (OUTPATIENT)
Dept: SURGERY | Facility: CLINIC | Age: 55
End: 2020-01-03
Payer: COMMERCIAL

## 2020-01-03 DIAGNOSIS — Z90.13 ABSENCE OF BREAST, BILATERAL: Primary | ICD-10-CM

## 2020-01-03 PROCEDURE — 99024 POSTOP FOLLOW-UP VISIT: CPT | Performed by: PHYSICIAN ASSISTANT

## 2020-01-03 RX ORDER — HYDROCODONE BITARTRATE AND ACETAMINOPHEN 5; 325 MG/1; MG/1
1-2 TABLET ORAL EVERY 4 HOURS PRN
Qty: 40 TABLET | Refills: 0 | Status: SHIPPED | OUTPATIENT
Start: 2020-01-03 | End: 2020-01-14 | Stop reason: ALTCHOICE

## 2020-01-03 NOTE — PROGRESS NOTES
This is a 59-year-old female that is 11 days status post her bilateral skin sparing mastectomies with right sentinel lymph node biopsy by Dr. Cassandra Marmolejo and immediate reconstruction with bilateral tissue expanders, ADM, prepectoral position, 180 cc Intra-Op ai knows.  She will notify us if this decision changes.   She feels that she is leaning towards flap based reconstruction in the future, as she does want a be smaller than she previously was and she does not want to worry about future follow-up every 10 to 15

## 2020-01-08 ENCOUNTER — NURSE NAVIGATOR ENCOUNTER (OUTPATIENT)
Dept: HEMATOLOGY/ONCOLOGY | Facility: HOSPITAL | Age: 55
End: 2020-01-08

## 2020-01-08 ENCOUNTER — TELEPHONE (OUTPATIENT)
Dept: HEMATOLOGY/ONCOLOGY | Facility: HOSPITAL | Age: 55
End: 2020-01-08

## 2020-01-08 NOTE — TELEPHONE ENCOUNTER
Message left for patient regarding follow up appointment with Dr. Madelin Jackson for 1/14/20 at 8:30am.  Asked patient to please return call to confirm.

## 2020-01-14 ENCOUNTER — OFFICE VISIT (OUTPATIENT)
Dept: HEMATOLOGY/ONCOLOGY | Facility: HOSPITAL | Age: 55
End: 2020-01-14
Attending: INTERNAL MEDICINE
Payer: COMMERCIAL

## 2020-01-14 VITALS
TEMPERATURE: 99 F | WEIGHT: 153 LBS | OXYGEN SATURATION: 99 % | HEART RATE: 71 BPM | SYSTOLIC BLOOD PRESSURE: 125 MMHG | DIASTOLIC BLOOD PRESSURE: 78 MMHG | HEIGHT: 67 IN | RESPIRATION RATE: 16 BRPM | BODY MASS INDEX: 24.01 KG/M2

## 2020-01-14 DIAGNOSIS — Z78.0 POST-MENOPAUSAL: ICD-10-CM

## 2020-01-14 DIAGNOSIS — Z17.0 MALIGNANT NEOPLASM OF UPPER-OUTER QUADRANT OF RIGHT BREAST IN FEMALE, ESTROGEN RECEPTOR POSITIVE (HCC): Primary | ICD-10-CM

## 2020-01-14 DIAGNOSIS — C50.411 MALIGNANT NEOPLASM OF UPPER-OUTER QUADRANT OF RIGHT BREAST IN FEMALE, ESTROGEN RECEPTOR POSITIVE (HCC): Primary | ICD-10-CM

## 2020-01-14 PROCEDURE — 99214 OFFICE O/P EST MOD 30 MIN: CPT | Performed by: INTERNAL MEDICINE

## 2020-01-14 RX ORDER — LETROZOLE 2.5 MG/1
2.5 TABLET, FILM COATED ORAL DAILY
Qty: 30 TABLET | Refills: 6 | Status: SHIPPED | OUTPATIENT
Start: 2020-01-14 | End: 2020-08-18

## 2020-01-14 NOTE — PROGRESS NOTES
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  The patient verbalized understanding and is willing to undergo treatment.   Discussed that she is supposed to take the AI at the same time every day and th

## 2020-01-14 NOTE — PATIENT INSTRUCTIONS
Letrozole tablets  Brand Name: Laverne Fernandez  What is this medicine? LETROZOLE (LET roseanna zole) blocks the production of estrogen. It is used to treat breast cancer. How should I use this medicine? Take this medicine by mouth with a glass of water.  You may yvette Store between 15 and 30 degrees C (59 and 86 degrees F). Throw away any unused medicine after the expiration date. What should I tell my health care provider before I take this medicine?   They need to know if you have any of these conditions:  · high chol

## 2020-01-15 ENCOUNTER — OFFICE VISIT (OUTPATIENT)
Dept: SURGERY | Facility: CLINIC | Age: 55
End: 2020-01-15
Payer: COMMERCIAL

## 2020-01-15 DIAGNOSIS — Z90.13 ABSENCE OF BREAST, BILATERAL: ICD-10-CM

## 2020-01-15 DIAGNOSIS — Z90.13 ACQUIRED ABSENCE OF BOTH BREASTS: Primary | ICD-10-CM

## 2020-01-15 PROCEDURE — 99024 POSTOP FOLLOW-UP VISIT: CPT | Performed by: PHYSICIAN ASSISTANT

## 2020-01-15 RX ORDER — CLINDAMYCIN HYDROCHLORIDE 300 MG/1
300 CAPSULE ORAL 3 TIMES DAILY
Qty: 42 CAPSULE | Refills: 1 | Status: SHIPPED | OUTPATIENT
Start: 2020-01-15 | End: 2020-01-29

## 2020-01-16 ENCOUNTER — TELEPHONE (OUTPATIENT)
Dept: SURGERY | Facility: CLINIC | Age: 55
End: 2020-01-16

## 2020-01-16 DIAGNOSIS — Z90.13 ACQUIRED ABSENCE OF BOTH BREASTS: Primary | ICD-10-CM

## 2020-01-16 RX ORDER — SULFAMETHOXAZOLE AND TRIMETHOPRIM 800; 160 MG/1; MG/1
1 TABLET ORAL 2 TIMES DAILY
Qty: 28 TABLET | Refills: 1 | Status: SHIPPED | OUTPATIENT
Start: 2020-01-16 | End: 2020-01-30

## 2020-01-16 NOTE — PROGRESS NOTES
Pearl Jarrell is a 47year old female who presents today for a follow-up s/p status post her bilateral skin sparing mastectomies with right sentinel lymph node biopsy by Dr. Bree Alexis and immediate reconstruction with bilateral tissue expanders, AD port and 60 cc of air was added. She tolerated this well. Neosporin and Band-Aid were placed. This puts her at a total of 240 cc of air on the left and 180 cc of air on the right.     Given the area of superficial wound separation on the right breast inci

## 2020-01-17 ENCOUNTER — TELEPHONE (OUTPATIENT)
Dept: SURGERY | Facility: CLINIC | Age: 55
End: 2020-01-17

## 2020-01-17 ENCOUNTER — OFFICE VISIT (OUTPATIENT)
Dept: SURGERY | Facility: CLINIC | Age: 55
End: 2020-01-17
Payer: COMMERCIAL

## 2020-01-17 VITALS — TEMPERATURE: 98 F

## 2020-01-17 DIAGNOSIS — C50.411 MALIGNANT NEOPLASM OF UPPER-OUTER QUADRANT OF RIGHT BREAST IN FEMALE, ESTROGEN RECEPTOR POSITIVE (HCC): ICD-10-CM

## 2020-01-17 DIAGNOSIS — Z90.13 ACQUIRED ABSENCE OF BOTH BREASTS: ICD-10-CM

## 2020-01-17 DIAGNOSIS — Z17.0 MALIGNANT NEOPLASM OF UPPER-OUTER QUADRANT OF RIGHT BREAST IN FEMALE, ESTROGEN RECEPTOR POSITIVE (HCC): ICD-10-CM

## 2020-01-17 DIAGNOSIS — Z90.13 ABSENCE OF BREAST, BILATERAL: Primary | ICD-10-CM

## 2020-01-17 PROCEDURE — 88305 TISSUE EXAM BY PATHOLOGIST: CPT | Performed by: SURGERY

## 2020-01-17 PROCEDURE — 99024 POSTOP FOLLOW-UP VISIT: CPT | Performed by: SURGERY

## 2020-01-17 NOTE — PROGRESS NOTES
Ya Auguste is a 47year old female who presents today for a follow-up in Dr. Tamie watkins. She complains of worsening appearance of her right breast T junction.     Physical Examination:   01/17/20  1340   Temp: 97.5 °F (36.4 °C)   TempSrc

## 2020-01-17 NOTE — PROGRESS NOTES
S/P BL breast reconstruction with TE (Howard 550cc medium wei smooth) placement prepectoral and ADM. Here c/o new problem; right breast wound. On day 2 of 7 Bactrim.

## 2020-01-17 NOTE — TELEPHONE ENCOUNTER
I spoke with the patient who called regarding her right breast incision-  She states that it has opened up more than it was on Wednesday (her last office visit) and she would like to send photos-  Our department email address was shared and I will anticipa

## 2020-01-17 NOTE — PROCEDURES
Debridement of right mastectomy skin flap necrosis. The area the eschar was encompassed in a vertical excision. The surgical site was then cleansed with alcohol and infiltrated with approximately 3 cc 1% lidocaine with epinephrine.   The area is prepped a

## 2020-01-24 ENCOUNTER — OFFICE VISIT (OUTPATIENT)
Dept: SURGERY | Facility: CLINIC | Age: 55
End: 2020-01-24
Payer: COMMERCIAL

## 2020-01-24 DIAGNOSIS — Z90.13 ABSENCE OF BREAST, BILATERAL: Primary | ICD-10-CM

## 2020-01-24 PROCEDURE — 99024 POSTOP FOLLOW-UP VISIT: CPT | Performed by: SURGERY

## 2020-01-24 NOTE — PROGRESS NOTES
Nadia Garces is a 47year old female who presents today for a follow-up s/p status post her bilateral skin sparing mastectomies with right sentinel lymph node biopsy by Dr. Knapp Car immediate reconstruction with bilateral tissue expanders, AD 2.5-3 months. We reviewed signs of infection, continued wound care, and reasons to contact our office. Questions were answered, patient understands.

## 2020-01-31 ENCOUNTER — OFFICE VISIT (OUTPATIENT)
Dept: SURGERY | Facility: CLINIC | Age: 55
End: 2020-01-31
Payer: COMMERCIAL

## 2020-01-31 DIAGNOSIS — Z90.13 ABSENCE OF BREAST, BILATERAL: Primary | ICD-10-CM

## 2020-01-31 PROCEDURE — 99024 POSTOP FOLLOW-UP VISIT: CPT | Performed by: SURGERY

## 2020-01-31 NOTE — PROGRESS NOTES
Chong Lobo is a 47year old female who presents today for a follow-up s/p bilateral skin sparing mastectomies with right sentinel lymph node biopsy by Dr. Sherren Artis immediate reconstruction with bilateral tissue expanders, ADM, prepectoral p were answered, patient understands.

## 2020-02-05 ENCOUNTER — TELEPHONE (OUTPATIENT)
Dept: HEMATOLOGY/ONCOLOGY | Facility: HOSPITAL | Age: 55
End: 2020-02-05

## 2020-02-06 DIAGNOSIS — Z17.0 MALIGNANT NEOPLASM OF UPPER-OUTER QUADRANT OF RIGHT BREAST IN FEMALE, ESTROGEN RECEPTOR POSITIVE (HCC): ICD-10-CM

## 2020-02-06 DIAGNOSIS — C50.411 MALIGNANT NEOPLASM OF UPPER-OUTER QUADRANT OF RIGHT BREAST IN FEMALE, ESTROGEN RECEPTOR POSITIVE (HCC): ICD-10-CM

## 2020-02-06 DIAGNOSIS — Z90.13 ABSENCE OF BREAST, BILATERAL: Primary | ICD-10-CM

## 2020-02-07 ENCOUNTER — OFFICE VISIT (OUTPATIENT)
Dept: SURGERY | Facility: CLINIC | Age: 55
End: 2020-02-07
Payer: COMMERCIAL

## 2020-02-07 DIAGNOSIS — N64.89 SEROMA OF BREAST: Primary | ICD-10-CM

## 2020-02-07 DIAGNOSIS — Z90.13 ABSENCE OF BREAST, BILATERAL: ICD-10-CM

## 2020-02-07 PROCEDURE — 87070 CULTURE OTHR SPECIMN AEROBIC: CPT | Performed by: SURGERY

## 2020-02-07 PROCEDURE — 87075 CULTR BACTERIA EXCEPT BLOOD: CPT | Performed by: SURGERY

## 2020-02-07 PROCEDURE — 99024 POSTOP FOLLOW-UP VISIT: CPT | Performed by: SURGERY

## 2020-02-07 PROCEDURE — 87205 SMEAR GRAM STAIN: CPT | Performed by: SURGERY

## 2020-02-07 PROCEDURE — 87102 FUNGUS ISOLATION CULTURE: CPT | Performed by: SURGERY

## 2020-02-07 PROCEDURE — 87206 SMEAR FLUORESCENT/ACID STAI: CPT | Performed by: SURGERY

## 2020-02-07 RX ORDER — HYDROCODONE BITARTRATE AND ACETAMINOPHEN 5; 325 MG/1; MG/1
TABLET ORAL
Qty: 20 TABLET | Refills: 0 | Status: ON HOLD | OUTPATIENT
Start: 2020-02-07 | End: 2020-03-02

## 2020-02-07 NOTE — PROGRESS NOTES
[address] is a 47year old female who presents today for a follow-up s/p bilateral skin sparing mastectomies with right sentinel lymph node biopsy by Dr. Diana Mccabe immediate reconstruction with bilateral tissue expanders, ADM, prepectoral p

## 2020-02-14 ENCOUNTER — OFFICE VISIT (OUTPATIENT)
Dept: SURGERY | Facility: CLINIC | Age: 55
End: 2020-02-14
Payer: COMMERCIAL

## 2020-02-14 DIAGNOSIS — N64.89 SEROMA OF BREAST: ICD-10-CM

## 2020-02-14 DIAGNOSIS — Z90.13 ABSENCE OF BREAST, BILATERAL: ICD-10-CM

## 2020-02-14 DIAGNOSIS — Z79.2 PROPHYLACTIC ANTIBIOTIC: Primary | ICD-10-CM

## 2020-02-14 PROCEDURE — 99024 POSTOP FOLLOW-UP VISIT: CPT | Performed by: SURGERY

## 2020-02-14 PROCEDURE — 87070 CULTURE OTHR SPECIMN AEROBIC: CPT | Performed by: SURGERY

## 2020-02-14 PROCEDURE — 87205 SMEAR GRAM STAIN: CPT | Performed by: SURGERY

## 2020-02-14 PROCEDURE — 87206 SMEAR FLUORESCENT/ACID STAI: CPT | Performed by: SURGERY

## 2020-02-14 PROCEDURE — 87075 CULTR BACTERIA EXCEPT BLOOD: CPT | Performed by: SURGERY

## 2020-02-14 PROCEDURE — 87102 FUNGUS ISOLATION CULTURE: CPT | Performed by: SURGERY

## 2020-02-14 RX ORDER — AMOXICILLIN AND CLAVULANATE POTASSIUM 875; 125 MG/1; MG/1
1 TABLET, FILM COATED ORAL 2 TIMES DAILY
Qty: 20 TABLET | Refills: 0 | Status: SHIPPED | OUTPATIENT
Start: 2020-02-14 | End: 2020-02-14

## 2020-02-14 RX ORDER — SULFAMETHOXAZOLE AND TRIMETHOPRIM 800; 160 MG/1; MG/1
TABLET ORAL
Qty: 20 TABLET | Refills: 0 | Status: ON HOLD | OUTPATIENT
Start: 2020-02-14 | End: 2020-03-02

## 2020-02-14 NOTE — PROGRESS NOTES
Sharon Toure a 47year old female who presents today for a follow-up s/p SSM and SLNBx Dr. Sheron Hurtado immediate reconstruction with bilateral TE, ADM, prepectoral position, 180 cc Intra-Op air fill on 12/23/2019 and wise skin excision. She u

## 2020-02-26 ENCOUNTER — OFFICE VISIT (OUTPATIENT)
Dept: SURGERY | Facility: CLINIC | Age: 55
End: 2020-02-26
Payer: COMMERCIAL

## 2020-02-26 DIAGNOSIS — N64.89 SEROMA OF BREAST: Primary | ICD-10-CM

## 2020-02-26 DIAGNOSIS — Z90.13 ABSENCE OF BREAST, BILATERAL: ICD-10-CM

## 2020-02-26 PROCEDURE — 87070 CULTURE OTHR SPECIMN AEROBIC: CPT | Performed by: SURGERY

## 2020-02-26 PROCEDURE — 87075 CULTR BACTERIA EXCEPT BLOOD: CPT | Performed by: SURGERY

## 2020-02-26 PROCEDURE — 87102 FUNGUS ISOLATION CULTURE: CPT | Performed by: SURGERY

## 2020-02-26 PROCEDURE — 87077 CULTURE AEROBIC IDENTIFY: CPT | Performed by: SURGERY

## 2020-02-26 PROCEDURE — 87206 SMEAR FLUORESCENT/ACID STAI: CPT | Performed by: SURGERY

## 2020-02-26 PROCEDURE — 87205 SMEAR GRAM STAIN: CPT | Performed by: SURGERY

## 2020-02-26 PROCEDURE — 99024 POSTOP FOLLOW-UP VISIT: CPT | Performed by: SURGERY

## 2020-02-26 NOTE — PROGRESS NOTES
Brad Barton is a 47year old female who presents today for a follow-up s/p SSM and SLNBx Dr. Allred Pen immediate reconstruction with bilateral TE, ADM, prepectoral position, 180 cc Intra-Op air fill on 12/23/2019 and wise skin excision. She u Bactrim DS for 5 days. She was instructed to call if the area of redness increases, she develops fever/chills, swelling, increasing pain, or wound separation/drainage. She will follow up in 1 week for continued filling.      Questions were answered, pat

## 2020-02-28 ENCOUNTER — TELEPHONE (OUTPATIENT)
Dept: SURGERY | Facility: CLINIC | Age: 55
End: 2020-02-28

## 2020-02-28 RX ORDER — CLINDAMYCIN HYDROCHLORIDE 300 MG/1
300 CAPSULE ORAL 3 TIMES DAILY
Qty: 30 CAPSULE | Refills: 0 | Status: ON HOLD | OUTPATIENT
Start: 2020-02-28 | End: 2020-03-02

## 2020-02-28 RX ORDER — HYDROCODONE BITARTRATE AND ACETAMINOPHEN 5; 325 MG/1; MG/1
1-2 TABLET ORAL EVERY 4 HOURS PRN
Qty: 20 TABLET | Refills: 0 | Status: ON HOLD | OUTPATIENT
Start: 2020-02-28 | End: 2020-03-02

## 2020-02-29 ENCOUNTER — ANESTHESIA (OUTPATIENT)
Dept: SURGERY | Facility: HOSPITAL | Age: 55
End: 2020-02-29
Payer: COMMERCIAL

## 2020-02-29 ENCOUNTER — HOSPITAL ENCOUNTER (OUTPATIENT)
Facility: HOSPITAL | Age: 55
Setting detail: OBSERVATION
Discharge: HOME | End: 2020-03-02
Attending: SURGERY | Admitting: SURGERY
Payer: COMMERCIAL

## 2020-02-29 ENCOUNTER — ANESTHESIA EVENT (OUTPATIENT)
Dept: SURGERY | Facility: HOSPITAL | Age: 55
End: 2020-02-29
Payer: COMMERCIAL

## 2020-02-29 DIAGNOSIS — L08.9: ICD-10-CM

## 2020-02-29 DIAGNOSIS — Z90.13 ABSENCE OF BREAST, BILATERAL: Primary | ICD-10-CM

## 2020-02-29 DIAGNOSIS — S20.152A: ICD-10-CM

## 2020-02-29 LAB
GLUCOSE BLDC GLUCOMTR-MCNC: 109 MG/DL (ref 70–99)
GLUCOSE BLDC GLUCOMTR-MCNC: 112 MG/DL (ref 70–99)
GLUCOSE BLDC GLUCOMTR-MCNC: 123 MG/DL (ref 70–99)
GLUCOSE BLDC GLUCOMTR-MCNC: 161 MG/DL (ref 70–99)

## 2020-02-29 PROCEDURE — 99233 SBSQ HOSP IP/OBS HIGH 50: CPT | Performed by: HOSPITALIST

## 2020-02-29 PROCEDURE — 0HBU0ZZ EXCISION OF LEFT BREAST, OPEN APPROACH: ICD-10-PCS | Performed by: SURGERY

## 2020-02-29 PROCEDURE — 0HPU0JZ REMOVAL OF SYNTHETIC SUBSTITUTE FROM LEFT BREAST, OPEN APPROACH: ICD-10-PCS | Performed by: SURGERY

## 2020-02-29 RX ORDER — LISINOPRIL 10 MG/1
10 TABLET ORAL DAILY
Status: DISCONTINUED | OUTPATIENT
Start: 2020-03-01 | End: 2020-03-02

## 2020-02-29 RX ORDER — HYDROCODONE BITARTRATE AND ACETAMINOPHEN 5; 325 MG/1; MG/1
1 TABLET ORAL AS NEEDED
Status: DISCONTINUED | OUTPATIENT
Start: 2020-02-29 | End: 2020-02-29 | Stop reason: HOSPADM

## 2020-02-29 RX ORDER — HYDROMORPHONE HYDROCHLORIDE 1 MG/ML
0.6 INJECTION, SOLUTION INTRAMUSCULAR; INTRAVENOUS; SUBCUTANEOUS EVERY 5 MIN PRN
Status: DISCONTINUED | OUTPATIENT
Start: 2020-02-29 | End: 2020-02-29 | Stop reason: HOSPADM

## 2020-02-29 RX ORDER — DULOXETIN HYDROCHLORIDE 30 MG/1
60 CAPSULE, DELAYED RELEASE ORAL NIGHTLY
Status: DISCONTINUED | OUTPATIENT
Start: 2020-02-29 | End: 2020-03-02

## 2020-02-29 RX ORDER — MORPHINE SULFATE 4 MG/ML
2 INJECTION, SOLUTION INTRAMUSCULAR; INTRAVENOUS EVERY 10 MIN PRN
Status: DISCONTINUED | OUTPATIENT
Start: 2020-02-29 | End: 2020-02-29 | Stop reason: HOSPADM

## 2020-02-29 RX ORDER — DEXTROSE MONOHYDRATE 25 G/50ML
50 INJECTION, SOLUTION INTRAVENOUS
Status: DISCONTINUED | OUTPATIENT
Start: 2020-02-29 | End: 2020-02-29 | Stop reason: HOSPADM

## 2020-02-29 RX ORDER — ONDANSETRON 2 MG/ML
4 INJECTION INTRAMUSCULAR; INTRAVENOUS ONCE AS NEEDED
Status: COMPLETED | OUTPATIENT
Start: 2020-02-29 | End: 2020-02-29

## 2020-02-29 RX ORDER — SODIUM CHLORIDE, SODIUM LACTATE, POTASSIUM CHLORIDE, CALCIUM CHLORIDE 600; 310; 30; 20 MG/100ML; MG/100ML; MG/100ML; MG/100ML
INJECTION, SOLUTION INTRAVENOUS CONTINUOUS
Status: DISCONTINUED | OUTPATIENT
Start: 2020-02-29 | End: 2020-03-01 | Stop reason: ALTCHOICE

## 2020-02-29 RX ORDER — DEXTROSE MONOHYDRATE 25 G/50ML
50 INJECTION, SOLUTION INTRAVENOUS
Status: DISCONTINUED | OUTPATIENT
Start: 2020-02-29 | End: 2020-03-02

## 2020-02-29 RX ORDER — BUPIVACAINE HYDROCHLORIDE 2.5 MG/ML
INJECTION, SOLUTION EPIDURAL; INFILTRATION; INTRACAUDAL AS NEEDED
Status: DISCONTINUED | OUTPATIENT
Start: 2020-02-29 | End: 2020-02-29 | Stop reason: HOSPADM

## 2020-02-29 RX ORDER — HYDROMORPHONE HYDROCHLORIDE 1 MG/ML
0.2 INJECTION, SOLUTION INTRAMUSCULAR; INTRAVENOUS; SUBCUTANEOUS EVERY 5 MIN PRN
Status: DISCONTINUED | OUTPATIENT
Start: 2020-02-29 | End: 2020-02-29 | Stop reason: HOSPADM

## 2020-02-29 RX ORDER — METOCLOPRAMIDE 10 MG/1
10 TABLET ORAL ONCE
Status: COMPLETED | OUTPATIENT
Start: 2020-02-29 | End: 2020-02-29

## 2020-02-29 RX ORDER — FAMOTIDINE 20 MG/1
20 TABLET ORAL ONCE
Status: DISCONTINUED | OUTPATIENT
Start: 2020-02-29 | End: 2020-02-29 | Stop reason: CLARIF

## 2020-02-29 RX ORDER — MIDAZOLAM HYDROCHLORIDE 1 MG/ML
INJECTION INTRAMUSCULAR; INTRAVENOUS AS NEEDED
Status: DISCONTINUED | OUTPATIENT
Start: 2020-02-29 | End: 2020-02-29 | Stop reason: SURG

## 2020-02-29 RX ORDER — DEXAMETHASONE SODIUM PHOSPHATE 4 MG/ML
VIAL (ML) INJECTION AS NEEDED
Status: DISCONTINUED | OUTPATIENT
Start: 2020-02-29 | End: 2020-02-29 | Stop reason: SURG

## 2020-02-29 RX ORDER — ONDANSETRON 2 MG/ML
4 INJECTION INTRAMUSCULAR; INTRAVENOUS EVERY 6 HOURS PRN
Status: DISCONTINUED | OUTPATIENT
Start: 2020-02-29 | End: 2020-03-02

## 2020-02-29 RX ORDER — DILTIAZEM HYDROCHLORIDE 180 MG/1
180 CAPSULE, EXTENDED RELEASE ORAL DAILY
Status: DISCONTINUED | OUTPATIENT
Start: 2020-03-01 | End: 2020-03-01

## 2020-02-29 RX ORDER — SODIUM CHLORIDE, SODIUM LACTATE, POTASSIUM CHLORIDE, CALCIUM CHLORIDE 600; 310; 30; 20 MG/100ML; MG/100ML; MG/100ML; MG/100ML
INJECTION, SOLUTION INTRAVENOUS CONTINUOUS
Status: DISCONTINUED | OUTPATIENT
Start: 2020-02-29 | End: 2020-02-29 | Stop reason: HOSPADM

## 2020-02-29 RX ORDER — HYDROCODONE BITARTRATE AND ACETAMINOPHEN 5; 325 MG/1; MG/1
2 TABLET ORAL AS NEEDED
Status: DISCONTINUED | OUTPATIENT
Start: 2020-02-29 | End: 2020-02-29 | Stop reason: HOSPADM

## 2020-02-29 RX ORDER — ONDANSETRON 2 MG/ML
INJECTION INTRAMUSCULAR; INTRAVENOUS AS NEEDED
Status: DISCONTINUED | OUTPATIENT
Start: 2020-02-29 | End: 2020-02-29 | Stop reason: SURG

## 2020-02-29 RX ORDER — SODIUM CHLORIDE, SODIUM LACTATE, POTASSIUM CHLORIDE, CALCIUM CHLORIDE 600; 310; 30; 20 MG/100ML; MG/100ML; MG/100ML; MG/100ML
INJECTION, SOLUTION INTRAVENOUS CONTINUOUS
Status: DISCONTINUED | OUTPATIENT
Start: 2020-02-29 | End: 2020-02-29

## 2020-02-29 RX ORDER — LIDOCAINE HYDROCHLORIDE 10 MG/ML
INJECTION, SOLUTION EPIDURAL; INFILTRATION; INTRACAUDAL; PERINEURAL AS NEEDED
Status: DISCONTINUED | OUTPATIENT
Start: 2020-02-29 | End: 2020-02-29 | Stop reason: SURG

## 2020-02-29 RX ORDER — HALOPERIDOL 5 MG/ML
0.25 INJECTION INTRAMUSCULAR ONCE AS NEEDED
Status: DISCONTINUED | OUTPATIENT
Start: 2020-02-29 | End: 2020-02-29 | Stop reason: HOSPADM

## 2020-02-29 RX ORDER — NALOXONE HYDROCHLORIDE 0.4 MG/ML
80 INJECTION, SOLUTION INTRAMUSCULAR; INTRAVENOUS; SUBCUTANEOUS AS NEEDED
Status: DISCONTINUED | OUTPATIENT
Start: 2020-02-29 | End: 2020-02-29 | Stop reason: HOSPADM

## 2020-02-29 RX ORDER — BACITRACIN 50000 [USP'U]/1
INJECTION, POWDER, LYOPHILIZED, FOR SOLUTION INTRAMUSCULAR AS NEEDED
Status: DISCONTINUED | OUTPATIENT
Start: 2020-02-29 | End: 2020-02-29 | Stop reason: HOSPADM

## 2020-02-29 RX ORDER — MORPHINE SULFATE 4 MG/ML
4 INJECTION, SOLUTION INTRAMUSCULAR; INTRAVENOUS EVERY 10 MIN PRN
Status: DISCONTINUED | OUTPATIENT
Start: 2020-02-29 | End: 2020-02-29 | Stop reason: HOSPADM

## 2020-02-29 RX ORDER — LIDOCAINE HYDROCHLORIDE 40 MG/ML
SOLUTION TOPICAL AS NEEDED
Status: DISCONTINUED | OUTPATIENT
Start: 2020-02-29 | End: 2020-02-29 | Stop reason: SURG

## 2020-02-29 RX ORDER — HYDROMORPHONE HYDROCHLORIDE 1 MG/ML
0.4 INJECTION, SOLUTION INTRAMUSCULAR; INTRAVENOUS; SUBCUTANEOUS EVERY 5 MIN PRN
Status: DISCONTINUED | OUTPATIENT
Start: 2020-02-29 | End: 2020-02-29 | Stop reason: HOSPADM

## 2020-02-29 RX ORDER — PROCHLORPERAZINE EDISYLATE 5 MG/ML
5 INJECTION INTRAMUSCULAR; INTRAVENOUS ONCE AS NEEDED
Status: DISCONTINUED | OUTPATIENT
Start: 2020-02-29 | End: 2020-02-29 | Stop reason: HOSPADM

## 2020-02-29 RX ORDER — FAMOTIDINE 20 MG/1
20 TABLET ORAL ONCE
Status: COMPLETED | OUTPATIENT
Start: 2020-02-29 | End: 2020-02-29

## 2020-02-29 RX ORDER — DIPHENHYDRAMINE HYDROCHLORIDE, ZINC ACETATE 2; .1 G/100G; G/100G
1 CREAM TOPICAL 3 TIMES DAILY PRN
Status: DISCONTINUED | OUTPATIENT
Start: 2020-02-29 | End: 2020-03-02

## 2020-02-29 RX ORDER — DIPHENHYDRAMINE HYDROCHLORIDE 50 MG/ML
INJECTION INTRAMUSCULAR; INTRAVENOUS AS NEEDED
Status: DISCONTINUED | OUTPATIENT
Start: 2020-02-29 | End: 2020-02-29 | Stop reason: SURG

## 2020-02-29 RX ORDER — MORPHINE SULFATE 10 MG/ML
6 INJECTION, SOLUTION INTRAMUSCULAR; INTRAVENOUS EVERY 10 MIN PRN
Status: DISCONTINUED | OUTPATIENT
Start: 2020-02-29 | End: 2020-02-29 | Stop reason: HOSPADM

## 2020-02-29 RX ORDER — ACETAMINOPHEN 500 MG
1000 TABLET ORAL ONCE
Status: COMPLETED | OUTPATIENT
Start: 2020-02-29 | End: 2020-02-29

## 2020-02-29 RX ORDER — HYDROCODONE BITARTRATE AND ACETAMINOPHEN 5; 325 MG/1; MG/1
1 TABLET ORAL EVERY 6 HOURS PRN
Status: DISCONTINUED | OUTPATIENT
Start: 2020-02-29 | End: 2020-03-02

## 2020-02-29 RX ORDER — PANTOPRAZOLE SODIUM 40 MG/1
40 TABLET, DELAYED RELEASE ORAL
Status: DISCONTINUED | OUTPATIENT
Start: 2020-03-01 | End: 2020-03-02

## 2020-02-29 RX ORDER — LETROZOLE 2.5 MG/1
2.5 TABLET, FILM COATED ORAL DAILY
Status: DISCONTINUED | OUTPATIENT
Start: 2020-03-01 | End: 2020-03-02

## 2020-02-29 RX ORDER — DIAZEPAM 2 MG/1
2 TABLET ORAL NIGHTLY PRN
Status: DISCONTINUED | OUTPATIENT
Start: 2020-02-29 | End: 2020-03-02

## 2020-02-29 RX ORDER — ROCURONIUM BROMIDE 10 MG/ML
INJECTION, SOLUTION INTRAVENOUS AS NEEDED
Status: DISCONTINUED | OUTPATIENT
Start: 2020-02-29 | End: 2020-02-29 | Stop reason: SURG

## 2020-02-29 RX ORDER — DOCUSATE SODIUM 100 MG/1
100 CAPSULE, LIQUID FILLED ORAL 2 TIMES DAILY
Status: DISCONTINUED | OUTPATIENT
Start: 2020-02-29 | End: 2020-03-02

## 2020-02-29 RX ORDER — GLYCOPYRROLATE 0.2 MG/ML
INJECTION INTRAMUSCULAR; INTRAVENOUS AS NEEDED
Status: DISCONTINUED | OUTPATIENT
Start: 2020-02-29 | End: 2020-02-29 | Stop reason: SURG

## 2020-02-29 RX ORDER — ENOXAPARIN SODIUM 100 MG/ML
40 INJECTION SUBCUTANEOUS NIGHTLY
Status: DISCONTINUED | OUTPATIENT
Start: 2020-02-29 | End: 2020-03-02

## 2020-02-29 RX ORDER — METOCLOPRAMIDE 10 MG/1
10 TABLET ORAL ONCE
Status: DISCONTINUED | OUTPATIENT
Start: 2020-02-29 | End: 2020-02-29 | Stop reason: CLARIF

## 2020-02-29 RX ADMIN — MIDAZOLAM HYDROCHLORIDE 2 MG: 1 INJECTION INTRAMUSCULAR; INTRAVENOUS at 11:07:00

## 2020-02-29 RX ADMIN — LIDOCAINE HYDROCHLORIDE 50 MG: 10 INJECTION, SOLUTION EPIDURAL; INFILTRATION; INTRACAUDAL; PERINEURAL at 11:11:00

## 2020-02-29 RX ADMIN — DEXAMETHASONE SODIUM PHOSPHATE 4 MG: 4 MG/ML VIAL (ML) INJECTION at 11:18:00

## 2020-02-29 RX ADMIN — LIDOCAINE HYDROCHLORIDE 4 ML: 40 SOLUTION TOPICAL at 11:14:00

## 2020-02-29 RX ADMIN — ONDANSETRON 4 MG: 2 INJECTION INTRAMUSCULAR; INTRAVENOUS at 13:01:00

## 2020-02-29 RX ADMIN — GLYCOPYRROLATE 0.2 MG: 0.2 INJECTION INTRAMUSCULAR; INTRAVENOUS at 11:11:00

## 2020-02-29 RX ADMIN — ROCURONIUM BROMIDE 10 MG: 10 INJECTION, SOLUTION INTRAVENOUS at 11:11:00

## 2020-02-29 RX ADMIN — SODIUM CHLORIDE, SODIUM LACTATE, POTASSIUM CHLORIDE, CALCIUM CHLORIDE: 600; 310; 30; 20 INJECTION, SOLUTION INTRAVENOUS at 13:14:00

## 2020-02-29 RX ADMIN — DIPHENHYDRAMINE HYDROCHLORIDE 12.5 MG: 50 INJECTION INTRAMUSCULAR; INTRAVENOUS at 11:18:00

## 2020-02-29 NOTE — BRIEF OP NOTE
Pre-Operative Diagnosis: absence of both breasts, left breast TE infection  Post-Operative Diagnosis: absence of both breasts, L breast Te infection  Procedure Performed:   Procedure(s):  left breast removal of tissue expander, complete capulectomy and com

## 2020-02-29 NOTE — PROGRESS NOTES
[facility] Dosing Service    Initial Pharmacokinetic Consult for Vancomycin Dosing     Jasmine Pena is a 47year old female who is being treated for cellulitis. Pharmacy has been asked to dose Vancomycin by Dr. Rachel Melara.     She is allergic t

## 2020-02-29 NOTE — ANESTHESIA PREPROCEDURE EVALUATION
Anesthesia PreOp Note    HPI:     Padmini Ontiveros is a 47year old female who presents for preoperative consultation requested by: Angelique Yang MD    Date of Surgery: 2/29/2020    Procedure(s):  BREAST MASTOPEXY  Indication: Superficial foreign after surgical procedure 1984   • [facility] 1997   • Congestive heart disease ([facility]    • Essential hypertension    • High blood pressure    • Hodgkin's disease ([facility] 1984    3B-chemo   • Neuropathy 1984   • Pancreatic insufficiency    • Personal 1800  Sulfamethoxazole-TMP -160 MG Oral Tab per tablet, To be used only as needed per Dr. Ana Dolan, Disp: 20 tablet, Rfl: 0, 2/28/2020 at 1800  letrozole 2.5 MG Oral Tab, Take 1 tablet (2.5 mg total) by mouth daily. , Disp: 30 tablet, Rfl: 6, 2/29/2020 a 1030  [COMPLETED] Metoclopramide HCl (REGLAN) tab 10 mg, 10 mg, Oral, Once, Viktor Krueger MD, 10 mg at 02/29/20 1030    No current [facility]-ordered outpatient medications on file.         Iodinated Diagnosti*    HIVES    Comment:Other reaction(s): IODINATED C Not on file        Relationship status: Not on file      Intimate partner violence:        Fear of current or ex partner: Not on file        Emotionally abused: Not on file        Physically abused: Not on file        Forced sexual activity: Not on file TempSrc:  Oral   SpO2:  98%   Weight: 65.8 kg (145 lb)    Height: 1.702 m (5' 7\")         Anesthesia Evaluation     Patient summary reviewed and Nursing notes reviewed    History of anesthetic complications (ponv)   Airway   Mallampati: II  TM distance:

## 2020-02-29 NOTE — ANESTHESIA POSTPROCEDURE EVALUATION
Patient: Lisa Gu    Procedure Summary     Date:  02/29/20 Room / Location:  [facility] OR 01 / [facility] OR    Anesthesia Start:  1107 Anesthesia Stop:  6372    Procedure:  BREAST MASTOPEXY (Left ) Diagnosis:       Superficial foreign body of

## 2020-02-29 NOTE — H&P
Estabilshed Patient Consultation    Chief Complaint: L breast cellulitis  History of Present Illness:   Gogo Varela is a 47year old female who was seen in the office on Wednesday and slight redness and tenderness in IMF T junction area.   Pt h Hodgkin;s disease-3B   • COLONOSCOPY  3/21/2014   • COLONOSCOPY N/A 10/19/2019    Performed by Bhupendra Gonzalez MD at [facility] ENDOSCOPY   • ESOPHAGOGASTRODUODENOSCOPY (EGD) N/A 10/19/2019    Performed by Bhupendra Gonzalez MD at [address] [place] [address] ENDOSCOPY   • [facility] membranes moist and pink    Integument/Skin: The skin appears normal. There are no suspicious appearing rashes or lesions.     Breasts: L breast T junction are with erythema and tenderness, R breast without any seroma, cellulitis or wound issues    Respirat

## 2020-02-29 NOTE — PROGRESS NOTES
[facility] Dosing Service    Initial Pharmacokinetic Consult for Vancomycin Dosing     Ya Auguste is a 47year old female who is being treated for cellulitis. Pharmacy has been asked to dose Vancomycin by Dr. Audrey Howard.     She is allergic t

## 2020-02-29 NOTE — PLAN OF CARE
Oriented x4, some drowsiness. Upx2 SB. Dilaudid PCA in place with adequate pain management. Scheduled abx cefepime and vanco. Voiding freely. Surgical dressing and Bra in place, CDI.  Pt's  to bring insulin pump from home, per Dr. Javan louis to use w fever/infection during anticipated neutropenic period  Description  INTERVENTIONS  - Monitor WBC  - Administer growth factors as ordered  - Implement neutropenic guidelines  2/29/2020 1726 by Keith Conner RN  Outcome: Progressing  2/29/2020 1717 by Len Cooper RN  Outcome: Progressing  2/29/2020 1717 by Gavi Jin RN  Outcome: Progressing     Problem: Altered Communication/Language Barrier  Goal: Patient/Family is able to understand and participate in their care  Description  Interventions:  - Assess comm

## 2020-02-29 NOTE — PROGRESS NOTES
[facility]    Progress Note    Lucas Brooks Patient Status:  Inpatient    1965 MRN [id number]   Location [facility] 4W/SW/SE Attending Darby Gooden MD   Hosp Day # 0 PCP Marcelino Meade MD       Subjective:   Dhe (H) 12/19/2019    CREATSERUM 0.80 12/19/2019    BUN 12 12/19/2019     (L) 12/19/2019    K 3.7 12/19/2019    CL 97 (L) 12/19/2019    CO2 30.0 12/19/2019    GLU 82 12/19/2019    CA 9.3 12/19/2019    ALB 4.0 12/19/2019    ALKPHO 79 12/19/2019    BILT 0.

## 2020-02-29 NOTE — ANESTHESIA PROCEDURE NOTES
Airway  Date/Time: 2/29/2020 11:15 AM  Urgency: Elective    Airway not difficult    General Information and Staff    Patient location during procedure: OR  Anesthesiologist: Jo Cleaning MD  Performed: anesthesiologist     Indications and Patient Jt Cabrera

## 2020-02-29 NOTE — CONSULTS
[facility]    Report of Consultation    Lawernce Modest Patient Status:  Inpatient    1965 MRN [id number]   Location [facility] 4W/SW/SE Attending Arla Simmonds., MD   Hosp Day # 0 PCP Meghana Daly MD     Date of Ad Performed by Norah Lockett MD at [address] [place] [address] OR   • BREAST RECONSTRUCTION/ IMMEDIATE/EXPANDER Bilateral 12/23/2019    Performed by Lisseth Gonzalez MD at [address] [place] [address] OR   • BREAST SENTINEL LYMPH NODE BIOPSY Right 12/23/2019    Performed by Norah Lockett, daily for 10 days. HYDROcodone-acetaminophen 5-325 MG Oral Tab, Take 1-2 tablets by mouth every 4 (four) hours as needed for Pain.   Sulfamethoxazole-TMP -160 MG Oral Tab per tablet, To be used only as needed per Dr. He Pro  letrozole 2.5 MG Oral Tab, or chest discomfort. No palpitations or edema. RESPIRATORY:  No shortness of breath, cough or sputum. GASTROINTESTINAL:  No anorexia, nausea, vomiting or diarrhea. No abdominal pain or blood. GENITOURINARY:  No Burning on urination.   NEUROLOGICAL:  No h year old female with h/o breast cancer s/p 12/23/19 Bilateral skin sparing mastectomy with right sentinel lymph node biopsy and bilateral breast reconstruction with placement of tissue expander. Developed a L breast seroma.   Had seroma drained in office 2

## 2020-02-29 NOTE — TELEPHONE ENCOUNTER
Pt with increased pain on L breast IMF and increased redness. Prescribed clindamycin and pt also will take cipro which she had at home. Gram + Cx pending. Discussed close monitoring and possible admission for Iv abx.  Also discussed possible need for washou

## 2020-03-01 LAB
ANION GAP SERPL CALC-SCNC: 6 MMOL/L (ref 0–18)
BASOPHILS # BLD AUTO: 0.04 X10(3) UL (ref 0–0.2)
BASOPHILS NFR BLD AUTO: 0.2 %
BUN BLD-MCNC: 12 MG/DL (ref 7–18)
BUN/CREAT SERPL: 14.8 (ref 10–20)
CALCIUM BLD-MCNC: 9.2 MG/DL (ref 8.5–10.1)
CHLORIDE SERPL-SCNC: 108 MMOL/L (ref 98–112)
CO2 SERPL-SCNC: 25 MMOL/L (ref 21–32)
CREAT BLD-MCNC: 0.81 MG/DL (ref 0.55–1.02)
DEPRECATED RDW RBC AUTO: 47.1 FL (ref 35.1–46.3)
EOSINOPHIL # BLD AUTO: 0 X10(3) UL (ref 0–0.7)
EOSINOPHIL NFR BLD AUTO: 0 %
ERYTHROCYTE [DISTWIDTH] IN BLOOD BY AUTOMATED COUNT: 14 % (ref 11–15)
EST. AVERAGE GLUCOSE BLD GHB EST-MCNC: 114 MG/DL (ref 68–126)
GLUCOSE BLD-MCNC: 108 MG/DL (ref 70–99)
GLUCOSE BLDC GLUCOMTR-MCNC: 104 MG/DL (ref 70–99)
GLUCOSE BLDC GLUCOMTR-MCNC: 109 MG/DL (ref 70–99)
GLUCOSE BLDC GLUCOMTR-MCNC: 112 MG/DL (ref 70–99)
GLUCOSE BLDC GLUCOMTR-MCNC: 124 MG/DL (ref 70–99)
HAV IGM SER QL: 2.3 MG/DL (ref 1.6–2.6)
HBA1C MFR BLD HPLC: 5.6 % (ref ?–5.7)
HCT VFR BLD AUTO: 36.1 % (ref 35–48)
HGB BLD-MCNC: 11.8 G/DL (ref 12–16)
IMM GRANULOCYTES # BLD AUTO: 0.06 X10(3) UL (ref 0–1)
IMM GRANULOCYTES NFR BLD: 0.4 %
LYMPHOCYTES # BLD AUTO: 2.83 X10(3) UL (ref 1–4)
LYMPHOCYTES NFR BLD AUTO: 17.4 %
MCH RBC QN AUTO: 30.2 PG (ref 26–34)
MCHC RBC AUTO-ENTMCNC: 32.7 G/DL (ref 31–37)
MCV RBC AUTO: 92.3 FL (ref 80–100)
MONOCYTES # BLD AUTO: 0.96 X10(3) UL (ref 0.1–1)
MONOCYTES NFR BLD AUTO: 5.9 %
NEUTROPHILS # BLD AUTO: 12.35 X10 (3) UL (ref 1.5–7.7)
NEUTROPHILS # BLD AUTO: 12.35 X10(3) UL (ref 1.5–7.7)
NEUTROPHILS NFR BLD AUTO: 76.1 %
OSMOLALITY SERPL CALC.SUM OF ELEC: 288 MOSM/KG (ref 275–295)
PLATELET # BLD AUTO: 531 10(3)UL (ref 150–450)
POTASSIUM SERPL-SCNC: 3.4 MMOL/L (ref 3.5–5.1)
POTASSIUM SERPL-SCNC: 3.9 MMOL/L (ref 3.5–5.1)
RBC # BLD AUTO: 3.91 X10(6)UL (ref 3.8–5.3)
SODIUM SERPL-SCNC: 139 MMOL/L (ref 136–145)
WBC # BLD AUTO: 16.2 X10(3) UL (ref 4–11)

## 2020-03-01 PROCEDURE — 99233 SBSQ HOSP IP/OBS HIGH 50: CPT | Performed by: HOSPITALIST

## 2020-03-01 RX ORDER — DILTIAZEM HYDROCHLORIDE 180 MG/1
360 CAPSULE, EXTENDED RELEASE ORAL DAILY
Status: DISCONTINUED | OUTPATIENT
Start: 2020-03-01 | End: 2020-03-02

## 2020-03-01 RX ORDER — CALCIUM CARBONATE 200(500)MG
500 TABLET,CHEWABLE ORAL 3 TIMES DAILY PRN
Status: DISCONTINUED | OUTPATIENT
Start: 2020-03-01 | End: 2020-03-02

## 2020-03-01 RX ORDER — POTASSIUM CHLORIDE 20 MEQ/1
40 TABLET, EXTENDED RELEASE ORAL EVERY 4 HOURS
Status: COMPLETED | OUTPATIENT
Start: 2020-03-01 | End: 2020-03-01

## 2020-03-01 RX ORDER — FAMOTIDINE 20 MG/1
20 TABLET ORAL 2 TIMES DAILY
Status: DISCONTINUED | OUTPATIENT
Start: 2020-03-01 | End: 2020-03-02

## 2020-03-01 RX ORDER — OXYCODONE HYDROCHLORIDE AND ACETAMINOPHEN 5; 325 MG/1; MG/1
1 TABLET ORAL EVERY 4 HOURS PRN
Status: DISCONTINUED | OUTPATIENT
Start: 2020-03-01 | End: 2020-03-02

## 2020-03-01 NOTE — OPERATIVE REPORT
[facility]    PATIENT'S NAME: BRI HERRERA   ATTENDING PHYSICIAN: Florence Spivey MD   OPERATING PHYSICIAN: Florence Spivey MD   PATIENT ACCOUNT#:   [id number]    LOCATION:  [address] [place] [address] #:   [id number]       DATE OF B Nevertheless, clinically, the right breast at this point appears completely healed without any signs of capsular contracture, infection, or seroma; therefore, we decided to only address the left breast to further decrease risk of additional surgical site o flap all the way surrounding the expander. There was particularly dense scar tissue and capsular contracture with thickened capsule including the AlloDerm at the superolateral aspect. This was reaching up to 1 cm in thickness.   This was all removed and h

## 2020-03-01 NOTE — PLAN OF CARE
A&Ox4. Up ad abundio, ambulating in halls. CC 1800 diet tolerating well. AC/HS accucheck. Pt voiding freely. Left NATANAEL, minimal serosanguinous output. Surgical bra and dressing CDI. Dilaudid PCA with adequate pain relief. Pt c/o acid indigestion.  Dr. Nayely Powell period  Description  INTERVENTIONS  - Monitor WBC  - Administer growth factors as ordered  - Implement neutropenic guidelines  Outcome: Progressing     Problem: SAFETY ADULT - FALL  Goal: Free from fall injury  Description  INTERVENTIONS:  - Assess pt freq visual cues when possible  - Listen attentively, be patient, do not interrupt  - Minimize distractions  - Allow time for understanding and response  - Establish method for patient to ask for assistance (call light)  - Provide an  as needed  - Co

## 2020-03-01 NOTE — PROGRESS NOTES
[facility]    ID Progress Note    Marissa Class Patient Status:  Inpatient    1965 MRN [id number]   Location [facility] 4W/SW/SE Attending Socorro Goodwin MD   Hosp Day # 1 PCP Theodore Harrison MD       Subjective: SURGERY  2013    hip pinning for stress fracture   • FELISHA LOCALIZATION WIRE 1 SITE RIGHT (CPT=19281)      1994   • SPLENECTOMY  1984       Family History  Family History   Problem Relation Age of Onset   • Glaucoma Mother    • Hypertension Mother    • Heart Sodium (PROTONIX) EC tab 40 mg, 40 mg, Oral, QAM AC  Vancomycin HCl (VANCOCIN) 1,000 mg in sodium chloride 0.9% 250 mL IVPB add-vantage, 15 mg/kg, Intravenous, Q12H  Cefepime HCl (MAXIPIME) 1 g in sodium chloride 0.9% 100 mL MBP/add-vantage, 1 g, Intraveno (four) times daily. Insulin Aspart Pen (FIASP FLEXTOUCH) 100 UNIT/ML Subcutaneous Solution Pen-injector, Inject into the skin 3 (three) times daily before meals.  Sliding scale based on carb intake at meals  insulin glargine 100 UNIT/ML Subcutaneous Soluti exudates  Neck supple, no masses, no adenopathy  No rash  Chest normal  Heart regular, no murmurs  L breast dressed, drain in place  No labored breathing, lungs clear anteriorly  Abdomen soft, NT, no rebound, no masses  No c/c/e, no open wounds        Resu foreign body infection. S/p 2/29/20  left breast removal of tissue expander, complete capulectomy and complex closure.   Operative cx with staph species    Recommendations:    - continue vancomycin, cefepime for now  - await id, sens staph  - likely home

## 2020-03-01 NOTE — PROGRESS NOTES
[facility]    Progress Note    Tommie Whitesburg Patient Status:  Inpatient    1965 MRN [id number]   Location [facility] 4W/SW/SE Attending Martina Arreguin MD   Hosp Day # 1 PCP Aleks Flower MD       Subjective:   Dhe CREATSERUM 0.81 03/01/2020    BUN 12 03/01/2020     03/01/2020    K 3.4 (L) 03/01/2020     03/01/2020    CO2 25.0 03/01/2020     (H) 03/01/2020    CA 9.2 03/01/2020    ALB 4.0 12/19/2019    ALKPHO 79 12/19/2019    BILT 0.4 12/19/2019

## 2020-03-01 NOTE — PLAN OF CARE
No acute events overnight. Pain persistent, but alleviated with use of Dilaudid PCA. Denies nausea, tolerating diet. Vitals WNL. Plan is continued antibiotics - discharge today versus tomorrow.        Problem: PAIN - ADULT  Goal: Verbalizes/displays adequat Monitor Blood Glucose as ordered  - Assess for signs and symptoms of hyperglycemia and hypoglycemia  - Administer ordered medications to maintain glucose within target range  - Assess barriers to adequate nutritional intake and initiate nutrition consult a

## 2020-03-01 NOTE — CONSULTS
[facility] HOSP - [facility]    Report of Endocrinology Consultation  [facility]    Alena Ye Patient Status:  Inpatient    1965 MRN [id number]   Location [facility] 4W/SW/SE Attending MD Siomara Maradiaga Laura Vaughn MD at [address] [place] [address] MAIN OR   • BREAST MASTECTOMY WITH PLASTIC SURGEON RECONSTRUCTION Bilateral 12/23/2019    Performed by Angelica Blancas MD at [address] [place] [address] OR   • BREAST RECONSTRUCTION/ IMMEDIATE/EXPANDER Bilateral 12/23/2019    Performed by Angelina Valenzuela (K-DUR M20) CR tab 40 mEq, 40 mEq, Oral, Q4H  •  Calcium Carbonate Antacid (TUMS) chewable tab 500 mg, 500 mg, Oral, TID PRN  •  famoTIDine (PEPCID) tab 20 mg, 20 mg, Oral, BID  •  insulin detemir (LEVEMIR) 100 UNIT/ML flextouch 5 Units, 5 Units, Subcutane negative  Gastrointestinal: negative  Genitourinary:negative  Integument/breast: negative  Hematologic/lymphatic: negative  Musculoskeletal:negative  Neurological: negative  Behavioral/Psych: negative  Endocrine: negative  Allergic/Immunologic: negative

## 2020-03-02 VITALS
HEIGHT: 67 IN | HEART RATE: 68 BPM | TEMPERATURE: 99 F | BODY MASS INDEX: 22.76 KG/M2 | WEIGHT: 145 LBS | SYSTOLIC BLOOD PRESSURE: 130 MMHG | DIASTOLIC BLOOD PRESSURE: 80 MMHG | RESPIRATION RATE: 18 BRPM | OXYGEN SATURATION: 98 %

## 2020-03-02 LAB
BASOPHILS # BLD: 0 X10(3) UL (ref 0–0.2)
BASOPHILS NFR BLD: 0 %
DEPRECATED RDW RBC AUTO: 48.1 FL (ref 35.1–46.3)
EOSINOPHIL # BLD: 0.13 X10(3) UL (ref 0–0.7)
EOSINOPHIL NFR BLD: 1 %
ERYTHROCYTE [DISTWIDTH] IN BLOOD BY AUTOMATED COUNT: 14.2 % (ref 11–15)
GLUCOSE BLDC GLUCOMTR-MCNC: 100 MG/DL (ref 70–99)
GLUCOSE BLDC GLUCOMTR-MCNC: 89 MG/DL (ref 70–99)
HCT VFR BLD AUTO: 34.2 % (ref 35–48)
HGB BLD-MCNC: 11.1 G/DL (ref 12–16)
LYMPHOCYTES NFR BLD: 37 %
LYMPHOCYTES NFR BLD: 4.91 X10(3) UL (ref 1–4)
MCH RBC QN AUTO: 30.2 PG (ref 26–34)
MCHC RBC AUTO-ENTMCNC: 32.5 G/DL (ref 31–37)
MCV RBC AUTO: 93.2 FL (ref 80–100)
MONOCYTES # BLD: 0.63 X10(3) UL (ref 0.1–1)
MONOCYTES NFR BLD: 5 %
MORPHOLOGY: NORMAL
NEUTROPHILS # BLD AUTO: 5.57 X10 (3) UL (ref 1.5–7.7)
NEUTROPHILS NFR BLD: 53 %
NEUTS BAND NFR BLD: 2 %
NEUTS HYPERSEG # BLD: 6.93 X10(3) UL (ref 1.5–7.7)
PLATELET # BLD AUTO: 498 10(3)UL (ref 150–450)
PLATELET MORPHOLOGY: NORMAL
RBC # BLD AUTO: 3.67 X10(6)UL (ref 3.8–5.3)
TOTAL CELLS COUNTED: 100
VANCOMYCIN TROUGH SERPL-MCNC: 13.4 UG/ML (ref 10–20)
VARIANT LYMPHS NFR BLD MANUAL: 2 %
WBC # BLD AUTO: 12.6 X10(3) UL (ref 4–11)

## 2020-03-02 PROCEDURE — 99239 HOSP IP/OBS DSCHRG MGMT >30: CPT | Performed by: HOSPITALIST

## 2020-03-02 RX ORDER — OXYCODONE HYDROCHLORIDE AND ACETAMINOPHEN 5; 325 MG/1; MG/1
1 TABLET ORAL EVERY 4 HOURS PRN
Qty: 30 TABLET | Refills: 0 | Status: ON HOLD | OUTPATIENT
Start: 2020-03-02 | End: 2020-04-08

## 2020-03-02 NOTE — PLAN OF CARE
No acute events overnight. Pain improved. Percocet administered PRN. Denies nausea, tolerating diet. Still no BM or gas - ambulating in halls frequently. Vitals WNL. Plan for discharge home later today.     Problem: PAIN - ADULT  Goal: Verbalizes/displays a range  Description  INTERVENTIONS:  - Monitor Blood Glucose as ordered  - Assess for signs and symptoms of hyperglycemia and hypoglycemia  - Administer ordered medications to maintain glucose within target range  - Assess barriers to adequate nutritional i

## 2020-03-02 NOTE — CM/SW NOTE
BETH self-referred to pt chart for discharge planning needs. Pt is from home, independent with all self-care. Per nursing rounds, pt may need outpatient IV infusion 1x/week.      BETH spoke to pt, who states that she would feel comfortable with the [facility]

## 2020-03-02 NOTE — DISCHARGE SUMMARY
[facility]    Discharge Summary    [address] Patient Status:  Outpatient in a Bed    1965 MRN [id number]   Location [facility] 4W/SW/SE Attending Elyse Shoemaker., MD   Hosp Day # 0 PCP Floresita Vidal MD     Geovanni Per Dr. Angelica Taylor is a 47year old female who was seen in the office on Wednesday and slight redness and tenderness in IMF T junction area.   Pt had BL mastectomy and TE placement, had post OP seroma on the L with negative cultures f Medications      START taking these medications      Instructions Prescription details   oxyCODONE-acetaminophen 5-325 MG Tabs  Commonly known as:  PERCOCET      Take 1 tablet by mouth every 4 (four) hours as needed.    Quantity:  30 tablet  Refills:  0 as:   NORCO        Sulfamethoxazole-TMP -160 MG Tabs per tablet  Commonly known as:  BACTRIM DS              Where to Get Your Medications      These medications were sent to [address] [place], [place] - [address] [place] [address] , [phone]

## 2020-03-02 NOTE — DIETARY NOTE
Diet Education:     Assessment deferred at this time per discussion. Noted decline in weight due to multiple factors.  Appetite is fair per patient/normally eats small amounts of food at home due to gastroparesis symptoms and focused on controlling diabetes

## 2020-03-02 NOTE — PROGRESS NOTES
PRS  Pt seen this morning  POD # 1 after removal of L breast TE and capsulectomy  Pt doing better, still has pain  PE: no hematoma no seroma, incisions CDI, NATANAEL in place, erythema almost resolved  A/P:  Pain mangement  NATANAEL care  Awaiting Cx per ID  From plas

## 2020-03-02 NOTE — PLAN OF CARE
Problem: Patient Centered Care  Goal: Patient preferences are identified and integrated in the patient's plan of care  Description  Interventions:  - What would you like us to know as we care for you?   - Provide timely, complete, and accurate informatio FALL  Goal: Free from fall injury  Description  INTERVENTIONS:  - Assess pt frequently for physical needs  - Identify cognitive and physical deficits and behaviors that affect risk of falls.   - [place] fall precautions as indicated by assessment.  - Educ Implement communication aides and strategies  - Use visual cues when possible  - Listen attentively, be patient, do not interrupt  - Minimize distractions  - Allow time for understanding and response  - Establish method for patient to ask for assistance (c IV antibiotics given prior to discharge.  VSS

## 2020-03-02 NOTE — PROGRESS NOTES
[facility] - [facility] ID PROGRESS NOTE    Fela Waters Patient Status:  Inpatient    1965 MRN [id number]   Location [facility] 4W/SW/SE Attending Brunilda Gary MD   Hosp Day # 2 PCP Abdirahman Bolivar MD     Subjective cancer s/p 12/23/19 Bilateral skin sparing mastectomy with right sentinel lymph node biopsy and bilateral breast reconstruction with placement of tissue expander. Developed a L breast seroma. Had seroma drained in office 2/7, 2/14 and 2/26.   All cultures

## 2020-03-03 ENCOUNTER — TELEPHONE (OUTPATIENT)
Dept: SURGERY | Facility: CLINIC | Age: 55
End: 2020-03-03

## 2020-03-03 ENCOUNTER — APPOINTMENT (OUTPATIENT)
Dept: GENERAL RADIOLOGY | Facility: HOSPITAL | Age: 55
End: 2020-03-03
Attending: EMERGENCY MEDICINE
Payer: COMMERCIAL

## 2020-03-03 ENCOUNTER — HOSPITAL ENCOUNTER (OUTPATIENT)
Facility: HOSPITAL | Age: 55
Setting detail: OBSERVATION
Discharge: HOME | End: 2020-03-05
Attending: EMERGENCY MEDICINE | Admitting: HOSPITALIST
Payer: COMMERCIAL

## 2020-03-03 DIAGNOSIS — N61.1 BREAST ABSCESS: Primary | ICD-10-CM

## 2020-03-03 LAB
ANION GAP SERPL CALC-SCNC: 3 MMOL/L (ref 0–18)
BASOPHILS # BLD AUTO: 0.03 X10(3) UL (ref 0–0.2)
BASOPHILS NFR BLD AUTO: 0.2 %
BILIRUB UR QL: NEGATIVE
BUN BLD-MCNC: 10 MG/DL (ref 7–18)
BUN/CREAT SERPL: 13 (ref 10–20)
CALCIUM BLD-MCNC: 9.1 MG/DL (ref 8.5–10.1)
CHLORIDE SERPL-SCNC: 106 MMOL/L (ref 98–112)
CO2 SERPL-SCNC: 31 MMOL/L (ref 21–32)
COLOR UR: YELLOW
CREAT BLD-MCNC: 0.77 MG/DL (ref 0.55–1.02)
DEPRECATED RDW RBC AUTO: 44.2 FL (ref 35.1–46.3)
EOSINOPHIL # BLD AUTO: 0.28 X10(3) UL (ref 0–0.7)
EOSINOPHIL NFR BLD AUTO: 1.6 %
ERYTHROCYTE [DISTWIDTH] IN BLOOD BY AUTOMATED COUNT: 13.5 % (ref 11–15)
GLUCOSE BLD-MCNC: 109 MG/DL (ref 70–99)
GLUCOSE BLDC GLUCOMTR-MCNC: 114 MG/DL (ref 70–99)
GLUCOSE UR-MCNC: NEGATIVE MG/DL
HCT VFR BLD AUTO: 42.7 % (ref 35–48)
HGB BLD-MCNC: 14.6 G/DL (ref 12–16)
HGB UR QL STRIP.AUTO: NEGATIVE
HYALINE CASTS #/AREA URNS AUTO: 1 /LPF
IMM GRANULOCYTES # BLD AUTO: 0.04 X10(3) UL (ref 0–1)
IMM GRANULOCYTES NFR BLD: 0.2 %
LACTATE SERPL-SCNC: 1.8 MMOL/L (ref 0.4–2)
LEUKOCYTE ESTERASE UR QL STRIP.AUTO: NEGATIVE
LYMPHOCYTES # BLD AUTO: 1.18 X10(3) UL (ref 1–4)
LYMPHOCYTES NFR BLD AUTO: 6.6 %
MCH RBC QN AUTO: 30.5 PG (ref 26–34)
MCHC RBC AUTO-ENTMCNC: 34.2 G/DL (ref 31–37)
MCV RBC AUTO: 89.3 FL (ref 80–100)
MONOCYTES # BLD AUTO: 0.37 X10(3) UL (ref 0.1–1)
MONOCYTES NFR BLD AUTO: 2.1 %
NEUTROPHILS # BLD AUTO: 16.02 X10 (3) UL (ref 1.5–7.7)
NEUTROPHILS # BLD AUTO: 16.02 X10(3) UL (ref 1.5–7.7)
NEUTROPHILS NFR BLD AUTO: 89.3 %
NITRITE UR QL STRIP.AUTO: NEGATIVE
OSMOLALITY SERPL CALC.SUM OF ELEC: 290 MOSM/KG (ref 275–295)
PH UR: 5 [PH] (ref 5–8)
PLATELET # BLD AUTO: 590 10(3)UL (ref 150–450)
POTASSIUM SERPL-SCNC: 4.1 MMOL/L (ref 3.5–5.1)
PROCALCITONIN SERPL-MCNC: 0.15 NG/ML
PROT UR-MCNC: 30 MG/DL
RBC # BLD AUTO: 4.78 X10(6)UL (ref 3.8–5.3)
RBC #/AREA URNS AUTO: 2 /HPF
SODIUM SERPL-SCNC: 140 MMOL/L (ref 136–145)
SP GR UR STRIP: 1.03 (ref 1–1.03)
UROBILINOGEN UR STRIP-ACNC: 2
WBC # BLD AUTO: 17.9 X10(3) UL (ref 4–11)
WBC #/AREA URNS AUTO: 8 /HPF

## 2020-03-03 PROCEDURE — 71046 X-RAY EXAM CHEST 2 VIEWS: CPT | Performed by: EMERGENCY MEDICINE

## 2020-03-03 PROCEDURE — 99220 INITIAL OBSERVATION CARE,LEVL III: CPT | Performed by: HOSPITALIST

## 2020-03-03 RX ORDER — SODIUM CHLORIDE 9 MG/ML
INJECTION, SOLUTION INTRAVENOUS CONTINUOUS
Status: DISCONTINUED | OUTPATIENT
Start: 2020-03-03 | End: 2020-03-05

## 2020-03-03 RX ORDER — MORPHINE SULFATE 2 MG/ML
2 INJECTION, SOLUTION INTRAMUSCULAR; INTRAVENOUS EVERY 2 HOUR PRN
Status: DISCONTINUED | OUTPATIENT
Start: 2020-03-03 | End: 2020-03-05

## 2020-03-03 RX ORDER — LISINOPRIL 10 MG/1
10 TABLET ORAL DAILY
Status: DISCONTINUED | OUTPATIENT
Start: 2020-03-04 | End: 2020-03-05

## 2020-03-03 RX ORDER — HYDRALAZINE HYDROCHLORIDE 20 MG/ML
10 INJECTION INTRAMUSCULAR; INTRAVENOUS EVERY 4 HOURS PRN
Status: DISCONTINUED | OUTPATIENT
Start: 2020-03-03 | End: 2020-03-05

## 2020-03-03 RX ORDER — DULOXETIN HYDROCHLORIDE 30 MG/1
60 CAPSULE, DELAYED RELEASE ORAL NIGHTLY
Status: DISCONTINUED | OUTPATIENT
Start: 2020-03-03 | End: 2020-03-05

## 2020-03-03 RX ORDER — DIAZEPAM 2 MG/1
2 TABLET ORAL NIGHTLY PRN
Status: DISCONTINUED | OUTPATIENT
Start: 2020-03-03 | End: 2020-03-05

## 2020-03-03 RX ORDER — SODIUM CHLORIDE 9 MG/ML
INJECTION, SOLUTION INTRAVENOUS CONTINUOUS
Status: DISCONTINUED | OUTPATIENT
Start: 2020-03-03 | End: 2020-03-03

## 2020-03-03 RX ORDER — DILTIAZEM HYDROCHLORIDE 180 MG/1
180 CAPSULE, EXTENDED RELEASE ORAL DAILY
Status: DISCONTINUED | OUTPATIENT
Start: 2020-03-04 | End: 2020-03-05

## 2020-03-03 RX ORDER — HEPARIN SODIUM 5000 [USP'U]/ML
5000 INJECTION, SOLUTION INTRAVENOUS; SUBCUTANEOUS EVERY 12 HOURS SCHEDULED
Status: DISCONTINUED | OUTPATIENT
Start: 2020-03-04 | End: 2020-03-05

## 2020-03-03 RX ORDER — PANTOPRAZOLE SODIUM 40 MG/1
40 TABLET, DELAYED RELEASE ORAL
Status: DISCONTINUED | OUTPATIENT
Start: 2020-03-04 | End: 2020-03-05

## 2020-03-03 RX ORDER — ACETAMINOPHEN 325 MG/1
650 TABLET ORAL EVERY 6 HOURS PRN
Status: DISCONTINUED | OUTPATIENT
Start: 2020-03-03 | End: 2020-03-05

## 2020-03-03 RX ORDER — MORPHINE SULFATE 4 MG/ML
4 INJECTION, SOLUTION INTRAMUSCULAR; INTRAVENOUS EVERY 2 HOUR PRN
Status: DISCONTINUED | OUTPATIENT
Start: 2020-03-03 | End: 2020-03-05

## 2020-03-03 RX ORDER — ZOLPIDEM TARTRATE 5 MG/1
5 TABLET ORAL NIGHTLY PRN
Status: DISCONTINUED | OUTPATIENT
Start: 2020-03-03 | End: 2020-03-05

## 2020-03-03 RX ORDER — DEXTROSE MONOHYDRATE 25 G/50ML
50 INJECTION, SOLUTION INTRAVENOUS
Status: DISCONTINUED | OUTPATIENT
Start: 2020-03-03 | End: 2020-03-05

## 2020-03-03 RX ORDER — ONDANSETRON 2 MG/ML
4 INJECTION INTRAMUSCULAR; INTRAVENOUS EVERY 6 HOURS PRN
Status: DISCONTINUED | OUTPATIENT
Start: 2020-03-03 | End: 2020-03-05

## 2020-03-03 RX ORDER — OXYCODONE HYDROCHLORIDE AND ACETAMINOPHEN 5; 325 MG/1; MG/1
1 TABLET ORAL EVERY 4 HOURS PRN
Status: DISCONTINUED | OUTPATIENT
Start: 2020-03-03 | End: 2020-03-05

## 2020-03-03 RX ORDER — LETROZOLE 2.5 MG/1
2.5 TABLET, FILM COATED ORAL DAILY
Status: DISCONTINUED | OUTPATIENT
Start: 2020-03-04 | End: 2020-03-05

## 2020-03-03 NOTE — TELEPHONE ENCOUNTER
I received a voicemail on our nurses' line from Neeru at [address] [place] [address] at [phone] [facility] department)-  Regarding this patient's inpatient stay status insurance denial for the period of 2/29-3/4  She stated we will receive a letter for this denial addressed to

## 2020-03-04 LAB
ANION GAP SERPL CALC-SCNC: 7 MMOL/L (ref 0–18)
BASOPHILS # BLD AUTO: 0.04 X10(3) UL (ref 0–0.2)
BASOPHILS NFR BLD AUTO: 0.4 %
BUN BLD-MCNC: 19 MG/DL (ref 7–18)
BUN/CREAT SERPL: 16.8 (ref 10–20)
CALCIUM BLD-MCNC: 8.5 MG/DL (ref 8.5–10.1)
CHLORIDE SERPL-SCNC: 104 MMOL/L (ref 98–112)
CO2 SERPL-SCNC: 26 MMOL/L (ref 21–32)
CREAT BLD-MCNC: 1.13 MG/DL (ref 0.55–1.02)
DEPRECATED RDW RBC AUTO: 46.4 FL (ref 35.1–46.3)
EOSINOPHIL # BLD AUTO: 0.39 X10(3) UL (ref 0–0.7)
EOSINOPHIL NFR BLD AUTO: 3.5 %
ERYTHROCYTE [DISTWIDTH] IN BLOOD BY AUTOMATED COUNT: 13.8 % (ref 11–15)
GLUCOSE BLD-MCNC: 94 MG/DL (ref 70–99)
GLUCOSE BLDC GLUCOMTR-MCNC: 106 MG/DL (ref 70–99)
GLUCOSE BLDC GLUCOMTR-MCNC: 107 MG/DL (ref 70–99)
GLUCOSE BLDC GLUCOMTR-MCNC: 125 MG/DL (ref 70–99)
GLUCOSE BLDC GLUCOMTR-MCNC: 78 MG/DL (ref 70–99)
HCT VFR BLD AUTO: 38 % (ref 35–48)
HGB BLD-MCNC: 12.5 G/DL (ref 12–16)
IMM GRANULOCYTES # BLD AUTO: 0.05 X10(3) UL (ref 0–1)
IMM GRANULOCYTES NFR BLD: 0.4 %
LYMPHOCYTES # BLD AUTO: 2.65 X10(3) UL (ref 1–4)
LYMPHOCYTES NFR BLD AUTO: 23.6 %
MCH RBC QN AUTO: 30 PG (ref 26–34)
MCHC RBC AUTO-ENTMCNC: 32.9 G/DL (ref 31–37)
MCV RBC AUTO: 91.3 FL (ref 80–100)
MONOCYTES # BLD AUTO: 0.42 X10(3) UL (ref 0.1–1)
MONOCYTES NFR BLD AUTO: 3.7 %
NEUTROPHILS # BLD AUTO: 7.66 X10 (3) UL (ref 1.5–7.7)
NEUTROPHILS # BLD AUTO: 7.66 X10(3) UL (ref 1.5–7.7)
NEUTROPHILS NFR BLD AUTO: 68.4 %
OSMOLALITY SERPL CALC.SUM OF ELEC: 286 MOSM/KG (ref 275–295)
PLATELET # BLD AUTO: 522 10(3)UL (ref 150–450)
POTASSIUM SERPL-SCNC: 3.7 MMOL/L (ref 3.5–5.1)
RBC # BLD AUTO: 4.16 X10(6)UL (ref 3.8–5.3)
SODIUM SERPL-SCNC: 137 MMOL/L (ref 136–145)
WBC # BLD AUTO: 11.2 X10(3) UL (ref 4–11)

## 2020-03-04 PROCEDURE — 99226 SUBSEQUENT OBSERVATION CARE: CPT | Performed by: HOSPITALIST

## 2020-03-04 RX ORDER — DIPHENHYDRAMINE HCL 25 MG
25 CAPSULE ORAL EVERY 6 HOURS PRN
Status: DISCONTINUED | OUTPATIENT
Start: 2020-03-04 | End: 2020-03-05

## 2020-03-04 RX ORDER — POTASSIUM CHLORIDE 20 MEQ/1
40 TABLET, EXTENDED RELEASE ORAL ONCE
Status: COMPLETED | OUTPATIENT
Start: 2020-03-04 | End: 2020-03-04

## 2020-03-04 NOTE — CONSULTS
[place] [facility] [facility] - [facility] CONSULT NOTE    Rupertorilla Boast Patient Status:  Observation    1965 MRN [id number]   Location [facility] 4W/SW/SE Attending Chen Shah MD   Hosp Day # 0 PCP Julia Nolan MD       Reas History:   Procedure Laterality Date   • APPENDECTOMY  1992   • AXILLARY NODE DISSECTION Right 12/23/2019    Performed by De Ferreira MD at [facility] OR   • BREAST MASTECTOMY WITH PLASTIC SURGEON RECONSTRUCTION Bilateral 12/23/2019    Performed by Tal SORES    Medications:    Current Facility-Administered Medications:   •  diltiazem (CARDIZEM CD) 24 hr cap 180 mg, 180 mg, Oral, Daily  •  cholecalciferol (VITAMIN D3) cap/tab 1,000 Units, 1,000 Units, Oral, Daily  •  pancrelipase (Lip-Prot-Amyl) (ZENPEP) HS    Review of Systems:  CONSTITUTIONAL:  No weight loss, +weakness or +fatigue. HEENT:  Eyes:  No visual loss, blurred vision, double vision or yellow sclerae. Ears, Nose, Throat:  No hearing loss, sneezing, congestion, runny nose or sore throat.   SKIN: 0.81  --   --  0.77 1.13*   GFRAA 95  --   --  101 64   GFRNAA 83  --   --  88 55*   CA 9.2  --   --  9.1 8.5     --   --  140 137   K  --    < > 3.9 4.1 3.7     --   --  106 104   CO2 25.0  --   --  31.0 26.0    < > = values in this interval n Case d/w patient, RN, ED, primary    Thank you for allowing us to participate in the care of this patient. Please do not hesitate to call if you have any questions.    We will continue to follow with you and will make further recommendations based on his pr

## 2020-03-04 NOTE — H&P
[facility] Patient Status:  Emergency    1965 MRN [id number]   Location [address] Attending Christiano Dang MD   Hosp Day # 0 PCP Mitchell Deng MD [address] [place] [address] MAIN OR   • BREAST RECONSTRUCTION/ IMMEDIATE/EXPANDER Bilateral 12/23/2019    Performed by Macie Mccullough MD at [address] [place] [address] OR   • BREAST SENTINEL LYMPH NODE BIOPSY Right 12/23/2019    Performed by Summer Fisher MD at [address] [place] [address] OR   • CHEMOTHERAPY  1 Cap DR Particles   Yes No   Sig: Take 60 mg by mouth nightly. Insulin Aspart Pen (FIASP FLEXTOUCH) 100 UNIT/ML Subcutaneous Solution Pen-injector   Yes No   Sig: Inject into the skin 3 (three) times daily before meals.  Sliding scale based on carb intake no carotid bruit, no jugular venous distention, no lymphadenopathy, no thyromegaly. Respiratory:  Lungs are clear to auscultation, respirations are non-labored, breath sounds are equal, symmetrical chest wall expansion.   Cardiovascular:  Normal rate, regu

## 2020-03-04 NOTE — PLAN OF CARE
Pain managed with morphine and percocet. Zofran for nausea. Abdominal rash with erythema, heat, and pruritus, benadryl given. Afebrile this shift. Ambulating independently in room. IVF infusing.  Decreased urine output, Dr. Waldo Curling notified, saline bolus g goals for specific interventions   Outcome: Progressing     Problem: PAIN - ADULT  Goal: Verbalizes/displays adequate comfort level or patient's stated pain goal  Description  INTERVENTIONS:  - Encourage pt to monitor pain and request assistance  - Assess

## 2020-03-04 NOTE — ED NOTES
Patient is safe to transport to floor per MD. Report given to floor RN, Jazmine Monzon at [phone]. Transport via cart in progress by ED tech.

## 2020-03-04 NOTE — ED PROVIDER NOTES
Patient Seen in: [facility] 4w/sw/se      History   Patient presents with:  Postop/Procedure Problem  Fever    Stated Complaint: FEVER    HPI    The patient is a 80-year-old female with remote history of lymphoma and splenectomy most currently marjan • BREAST MASTECTOMY WITH PLASTIC SURGEON RECONSTRUCTION Bilateral 12/23/2019    Performed by Kevin Butcher MD at [address] [place] [address] OR   • BREAST MASTOPEXY Left 2/29/2020    Performed by Lexy Cruz MD at [address] [place] [address] OR   • BREAST RECONSTRUCTION/ IMMEDIATE/EXP Appearance: Normal appearance. She is well-developed. She is not ill-appearing. HENT:      Head: Normocephalic and atraumatic.       Nose: Nose normal.      Mouth/Throat:      Mouth: Mucous membranes are moist.   Eyes:      Conjunctiva/sclera: Conjunct BASIC METABOLIC PANEL (8) - Abnormal; Notable for the following components:       Result Value    Glucose 109 (*)     All other components within normal limits   URINALYSIS, ROUTINE - Abnormal; Notable for the following components:    Clarity Urine Cloudy Radiology findings: Xr Chest Pa + Lat Chest (cpt=71046)    Result Date: 3/3/2020  CONCLUSION:  1. No focal airspace disease or pleural effusion. 2. Postoperative changes involving both breasts.    Dictated by (CST): Dion Gonzales MD on 3/03/2020 at 9:28 P

## 2020-03-04 NOTE — PROGRESS NOTES
PRS  Pt admitted for fevers after TE removal over the weekend  Pt now doing better  PE: incisions CDI BL breasts no erythema or cellulitis or wound drainage  A/P: no surgical issue with either breast at this time, most likely not source of infection/ fever

## 2020-03-04 NOTE — PROGRESS NOTES
[facility]    Progress Note    Chong Lobo Patient Status:  Observation    1965 MRN [id number]   Location [facility] 4W/SW/SE Attending Zeenat Arreola MD   Hosp Day # 0 PCP MD Shadi Garcia Cooler the same.     Benign hypertension  Blood pressure well controlled resume home medication.     Prophylaxis  Subcutaneous heparin     CODE STATUS  Full    VTE ppx: heparin subq     Dispo:   - will monitor overnight  - fever likely a drug reaction,  - d/w in

## 2020-03-04 NOTE — PLAN OF CARE
Patient admitted for post op fever and pain s/p breast sx. JPx1 intact. Pain managed with PRN morphine and percocet. IVF running. PRN tylenol given for fever. Up ad abundio. Plan for ID consult today.     Problem: Patient Centered Care  Goal: Patient preference pain and request assistance  - Assess pain using appropriate pain scale  - Administer analgesics based on type and severity of pain and evaluate response  - Implement non-pharmacological measures as appropriate and evaluate response  - Consider cultural an

## 2020-03-04 NOTE — ED NOTES
The patient who has suffered complications following double mastectomy related to tissue expanders in left breast complains of severe pain and post-op fever of 101. The patient is alert and oriented X 4 with her  at the bedside.

## 2020-03-05 VITALS
HEART RATE: 70 BPM | RESPIRATION RATE: 18 BRPM | TEMPERATURE: 98 F | SYSTOLIC BLOOD PRESSURE: 138 MMHG | BODY MASS INDEX: 22.44 KG/M2 | WEIGHT: 143 LBS | DIASTOLIC BLOOD PRESSURE: 88 MMHG | HEIGHT: 67 IN | OXYGEN SATURATION: 100 %

## 2020-03-05 LAB
ANION GAP SERPL CALC-SCNC: 5 MMOL/L (ref 0–18)
BASOPHILS # BLD AUTO: 0.04 X10(3) UL (ref 0–0.2)
BASOPHILS NFR BLD AUTO: 0.6 %
BUN BLD-MCNC: 13 MG/DL (ref 7–18)
BUN/CREAT SERPL: 17.6 (ref 10–20)
CALCIUM BLD-MCNC: 8 MG/DL (ref 8.5–10.1)
CHLORIDE SERPL-SCNC: 113 MMOL/L (ref 98–112)
CO2 SERPL-SCNC: 26 MMOL/L (ref 21–32)
CREAT BLD-MCNC: 0.74 MG/DL (ref 0.55–1.02)
DEPRECATED RDW RBC AUTO: 47.5 FL (ref 35.1–46.3)
EOSINOPHIL # BLD AUTO: 0.89 X10(3) UL (ref 0–0.7)
EOSINOPHIL NFR BLD AUTO: 12.9 %
ERYTHROCYTE [DISTWIDTH] IN BLOOD BY AUTOMATED COUNT: 13.9 % (ref 11–15)
GLUCOSE BLD-MCNC: 96 MG/DL (ref 70–99)
GLUCOSE BLDC GLUCOMTR-MCNC: 98 MG/DL (ref 70–99)
HAV IGM SER QL: 2.2 MG/DL (ref 1.6–2.6)
HCT VFR BLD AUTO: 30.7 % (ref 35–48)
HGB BLD-MCNC: 10.2 G/DL (ref 12–16)
IMM GRANULOCYTES # BLD AUTO: 0.01 X10(3) UL (ref 0–1)
IMM GRANULOCYTES NFR BLD: 0.1 %
LYMPHOCYTES # BLD AUTO: 3.71 X10(3) UL (ref 1–4)
LYMPHOCYTES NFR BLD AUTO: 53.8 %
MCH RBC QN AUTO: 30.6 PG (ref 26–34)
MCHC RBC AUTO-ENTMCNC: 33.2 G/DL (ref 31–37)
MCV RBC AUTO: 92.2 FL (ref 80–100)
MONOCYTES # BLD AUTO: 0.72 X10(3) UL (ref 0.1–1)
MONOCYTES NFR BLD AUTO: 10.4 %
NEUTROPHILS # BLD AUTO: 1.53 X10 (3) UL (ref 1.5–7.7)
NEUTROPHILS # BLD AUTO: 1.53 X10(3) UL (ref 1.5–7.7)
NEUTROPHILS NFR BLD AUTO: 22.2 %
OSMOLALITY SERPL CALC.SUM OF ELEC: 298 MOSM/KG (ref 275–295)
PHOSPHATE SERPL-MCNC: 3.7 MG/DL (ref 2.5–4.9)
PLATELET # BLD AUTO: 473 10(3)UL (ref 150–450)
POTASSIUM SERPL-SCNC: 3.9 MMOL/L (ref 3.5–5.1)
RBC # BLD AUTO: 3.33 X10(6)UL (ref 3.8–5.3)
SODIUM SERPL-SCNC: 144 MMOL/L (ref 136–145)
WBC # BLD AUTO: 6.9 X10(3) UL (ref 4–11)

## 2020-03-05 PROCEDURE — 99217 OBSERVATION CARE DISCHARGE: CPT | Performed by: HOSPITALIST

## 2020-03-05 RX ORDER — SULFAMETHOXAZOLE AND TRIMETHOPRIM 800; 160 MG/1; MG/1
1 TABLET ORAL 2 TIMES DAILY
Qty: 20 TABLET | Refills: 0 | Status: SHIPPED | OUTPATIENT
Start: 2020-03-05 | End: 2020-03-15

## 2020-03-05 NOTE — PAYOR COMM NOTE
--------------  DISCHARGE REVIEW  3-2-20     Payor: Arvind PICKETT  Subscriber #:  [id number]  Authorization Number: [id number]    Admit date: 3/2/20  Admit time:  8104  Discharge Date: 3/2/2020  2:56 PM     Admitting Physician: Vinita Velasquez MD  Attending P seroma on the L with negative cultures for the last 3 weeks. The last cx on Wednesday showed 1+gram positive on Friday afternoon. Pts  contacted me and we changed her to clindamycin. Robert were called in as well.  I contacted the pt this morning to gail Quantity:  30 tablet  Refills:  0        CONTINUE taking these medications      Instructions Prescription details   Cartia  MG Cp24  Generic drug:  dilTIAZem HCl ER Coated Beads      TAKE TWO CAPSULES BY MOUTH IN THE MORNING   Quantity:  60 capsule [facility] [phone], [phone] [address], [address] [facility]    Phone:  [phone]   · oxyCODONE-acetaminophen 5-325 MG Tabs         Follow up Visits:  Follow-up with pcp in 1 week    Follow up Labs: n/a     Other Discharge Instructions: f/u wi

## 2020-03-05 NOTE — PAYOR COMM NOTE
--------------  ADMISSION REVIEW  2-29-20         Payor: Phi Sethi PPO  Subscriber #:  [id number]  Authorization Number: [id number]    Admit date: 3/2/20  Admit time: 46       Admitting Physician: Tesfaye Bergeron MD  Attending Physician:  Noa att. providers Physical Exam:    /66 (BP Location: Left arm)   Pulse 78   Temp 97.9 °F (36.6 °C) (Oral)   Resp 17   Ht 1.702 m (5' 7\")   Wt 65.8 kg (145 lb)   SpO2 98%   BMI 22.71 kg/m²      Constitutional: The patient is an alert, oriented and well-develope sentinel lymph node biopsy and bilateral breast reconstruction with placement of tissue expander. # Post-op L breast seroma. Seroma drained in office 2/7, 2/14 and 2/26. All cultures were negative however 2/26 culture with 1+ GP growth.   # L breast cell

## 2020-03-05 NOTE — PLAN OF CARE
Patient A&Ox4. Independent in room. Carb controlled diet, tolerating well. Pain controlled with IV morphine and percocet. Continuing IV fluids. Afebrile over shift NATANAEL drain to left breast, to bulb suction, minimal output. Call light in reach.     Problem: P goal  Description  INTERVENTIONS:  - Encourage pt to monitor pain and request assistance  - Assess pain using appropriate pain scale  - Administer analgesics based on type and severity of pain and evaluate response  - Implement non-pharmacological measures

## 2020-03-05 NOTE — PLAN OF CARE
Patient c/o L breast pain, managed with percocet prn. Tolerating diet, accu checks ACHS. Ambulating independently in hallways and room. Denies N/V. Plan to d/c home with NATANAEL in place. Pt aware of plan and is in agreement.        Problem: Patient Centered Car stated pain goal  Description  INTERVENTIONS:  - Encourage pt to monitor pain and request assistance  - Assess pain using appropriate pain scale  - Administer analgesics based on type and severity of pain and evaluate response  - Implement non-pharmacologi

## 2020-03-05 NOTE — PAYOR COMM NOTE
--------------  CONTINUED STAY REVIEW   3-1-20     Karen Ibarra PP  Subscriber #:  [id number]  Authorization Number: [id number]    Admit date: 3/2/20  Admit time: 46    Admitting Physician: Hubert Hawkins MD  Attending Physician:  Noa temple providers fo

## 2020-03-05 NOTE — PROGRESS NOTES
[facility]    Steffany Bachelor Patient Status:  Observation    1965 MRN [id number]   Location [facility] 4W/SW/SE Attending No att. providers found   Hosp Day # 0 PCP Gisela Stanford MD     Sub positive ([facility]     Essential hypertension     Insulin dependent type 2 diabetes mellitus (HCC)     Absence of breast, bilateral     Absence of both breasts     Breast abscess      ASSESSMENT:    Antibiotics: IV dalbavancin 3/3/20     47year old female wit

## 2020-03-05 NOTE — DISCHARGE SUMMARY
[facility]    PATIENT'S NAME: Esther Osvaldo [facility]   ATTENDING PHYSICIAN: Tasneem Reese MD   PATIENT ACCOUNT#:   [id number]    LOCATION:  [address] [place] [address] Street RECORD #:   [id number]       YOB: 1965  ADMISSION DATE: PHYSICAL EXAMINATION:    GENERAL:  Patient lying in bed, appears to be in no acute distress at this time. She is A and O x3. VITAL SIGNS:  Temperature 98.8, pulse 64, respirations 16, blood pressure 112/79, saturating 94% on room air.   HEENT:  Extra

## 2020-03-06 ENCOUNTER — TELEPHONE (OUTPATIENT)
Dept: HEMATOLOGY/ONCOLOGY | Facility: HOSPITAL | Age: 55
End: 2020-03-06

## 2020-03-06 NOTE — TELEPHONE ENCOUNTER
Ms. Juan Marcano was unable to meet with me in the Survivorship Clinic.   I sent the materials to her home via certified mail and included a [address], a letter describing the purpose of the care plan and a folder including survivorship reso

## 2020-03-13 ENCOUNTER — OFFICE VISIT (OUTPATIENT)
Dept: SURGERY | Facility: CLINIC | Age: 55
End: 2020-03-13
Payer: COMMERCIAL

## 2020-03-13 DIAGNOSIS — Z90.13 ABSENCE OF BOTH BREASTS: Primary | ICD-10-CM

## 2020-03-13 PROCEDURE — 1111F DSCHRG MED/CURRENT MED MERGE: CPT | Performed by: SURGERY

## 2020-03-13 PROCEDURE — 99024 POSTOP FOLLOW-UP VISIT: CPT | Performed by: SURGERY

## 2020-03-13 NOTE — PROGRESS NOTES
Nahed Madrigal is a 47year old female who presents today for a follow-up s/p SSM and SLNBx Dr. Lopez Feliciano immediate reconstruction with bilateral TE, ADM, prepectoral position, 180 cc Intra-Op air fill on 12/23/2019 and wise skin excision. She u swelling, redness, skin breakdown, wound drainage, fever/chills. Questions were answered, patient understands.

## 2020-04-06 ENCOUNTER — HOSPITAL ENCOUNTER (OUTPATIENT)
Facility: HOSPITAL | Age: 55
Setting detail: OBSERVATION
Discharge: HOME | End: 2020-04-08
Attending: EMERGENCY MEDICINE | Admitting: HOSPITALIST
Payer: COMMERCIAL

## 2020-04-06 DIAGNOSIS — R63.0 ANOREXIA: ICD-10-CM

## 2020-04-06 DIAGNOSIS — U07.1 COVID-19: ICD-10-CM

## 2020-04-06 DIAGNOSIS — E86.0 DEHYDRATION: Primary | ICD-10-CM

## 2020-04-06 PROCEDURE — 99244 OFF/OP CNSLTJ NEW/EST MOD 40: CPT | Performed by: INTERNAL MEDICINE

## 2020-04-06 PROCEDURE — 99219 INITIAL OBSERVATION CARE,LEVL II: CPT | Performed by: HOSPITALIST

## 2020-04-06 RX ORDER — LISINOPRIL 10 MG/1
10 TABLET ORAL DAILY
Status: DISCONTINUED | OUTPATIENT
Start: 2020-04-07 | End: 2020-04-08

## 2020-04-06 RX ORDER — SODIUM CHLORIDE 0.9 % (FLUSH) 0.9 %
3 SYRINGE (ML) INJECTION AS NEEDED
Status: DISCONTINUED | OUTPATIENT
Start: 2020-04-06 | End: 2020-04-08

## 2020-04-06 RX ORDER — DEXTROSE MONOHYDRATE 25 G/50ML
50 INJECTION, SOLUTION INTRAVENOUS
Status: DISCONTINUED | OUTPATIENT
Start: 2020-04-06 | End: 2020-04-08

## 2020-04-06 RX ORDER — DIPHENHYDRAMINE HCL 50 MG
50 CAPSULE ORAL NIGHTLY PRN
Status: DISCONTINUED | OUTPATIENT
Start: 2020-04-06 | End: 2020-04-08

## 2020-04-06 RX ORDER — DILTIAZEM HYDROCHLORIDE 180 MG/1
360 CAPSULE, EXTENDED RELEASE ORAL DAILY
Status: DISCONTINUED | OUTPATIENT
Start: 2020-04-07 | End: 2020-04-08

## 2020-04-06 RX ORDER — DEXTROSE, SODIUM CHLORIDE, AND POTASSIUM CHLORIDE 5; .45; .3 G/100ML; G/100ML; G/100ML
INJECTION INTRAVENOUS CONTINUOUS
Status: DISCONTINUED | OUTPATIENT
Start: 2020-04-06 | End: 2020-04-08

## 2020-04-06 RX ORDER — OXYCODONE HYDROCHLORIDE AND ACETAMINOPHEN 5; 325 MG/1; MG/1
1 TABLET ORAL EVERY 4 HOURS PRN
Status: DISCONTINUED | OUTPATIENT
Start: 2020-04-06 | End: 2020-04-08

## 2020-04-06 RX ORDER — METOCLOPRAMIDE HYDROCHLORIDE 5 MG/ML
5 INJECTION INTRAMUSCULAR; INTRAVENOUS
Status: DISCONTINUED | OUTPATIENT
Start: 2020-04-06 | End: 2020-04-08

## 2020-04-06 RX ORDER — QUETIAPINE 25 MG/1
25 TABLET, FILM COATED ORAL NIGHTLY
Status: DISCONTINUED | OUTPATIENT
Start: 2020-04-06 | End: 2020-04-08

## 2020-04-06 RX ORDER — LETROZOLE 2.5 MG/1
2.5 TABLET, FILM COATED ORAL DAILY
Status: DISCONTINUED | OUTPATIENT
Start: 2020-04-07 | End: 2020-04-08

## 2020-04-06 RX ORDER — PANTOPRAZOLE SODIUM 20 MG/1
20 TABLET, DELAYED RELEASE ORAL
Status: DISCONTINUED | OUTPATIENT
Start: 2020-04-06 | End: 2020-04-08

## 2020-04-06 RX ORDER — SODIUM CHLORIDE 9 MG/ML
INJECTION, SOLUTION INTRAVENOUS CONTINUOUS
Status: DISCONTINUED | OUTPATIENT
Start: 2020-04-06 | End: 2020-04-06

## 2020-04-06 RX ORDER — DIAZEPAM 10 MG/1
10 TABLET ORAL NIGHTLY PRN
Status: CANCELLED | OUTPATIENT
Start: 2020-04-06

## 2020-04-06 RX ORDER — ACETAMINOPHEN 325 MG/1
650 TABLET ORAL EVERY 6 HOURS PRN
Status: DISCONTINUED | OUTPATIENT
Start: 2020-04-06 | End: 2020-04-08

## 2020-04-06 RX ORDER — DULOXETIN HYDROCHLORIDE 60 MG/1
60 CAPSULE, DELAYED RELEASE ORAL NIGHTLY
Status: DISCONTINUED | OUTPATIENT
Start: 2020-04-06 | End: 2020-04-08

## 2020-04-06 RX ORDER — ONDANSETRON 2 MG/ML
4 INJECTION INTRAMUSCULAR; INTRAVENOUS EVERY 6 HOURS PRN
Status: DISCONTINUED | OUTPATIENT
Start: 2020-04-06 | End: 2020-04-08

## 2020-04-06 NOTE — PLAN OF CARE
Arrived from home to ER. Covid + at [facility] 5 days ago she stated and feels worse. General diet per Dr Vickii Felty, refuses her insulin pump while here and he is aware. Percocet for severe pounding headache/backache pain.  Up and about, gait slow, lef

## 2020-04-06 NOTE — ED NOTES
Pt to ED c/o nausea and vomiting and decreased appetite. Pt reports +COVID on approximate day 12 of illness. No acute respiratory distress noted, patient speaking in full sentences without dyspnea noted.

## 2020-04-06 NOTE — CONSULTS
[facility]  Report of GI Consultation    Faustino Ahn Patient Status:  Observation    1965 MRN [id number]   Location [facility] 4W/SW/SE Attending Sandford Duane, MD   Hosp Day # 0 PCP León Arce MD     Date o 3/27/2020. Hypoxia, low pulse oximetry readings also improved after starting the Plaquenil. · Seen in [facility] ? tent/screening clinic for less than 23hrs for the COVID syndrome Friday 4/3/2020; discharged when found to be nontoxic, no respiratory sym mastectomy and attempted breast reconstruction surgery 88/72/5558 complicated by R mastectomy skin flap necrosis/debridement; capsule retraction then wound infection L side requiring surgery on 2/29/2020 for removal of L tissue expander, capsulectomy.     P lb (67.1 kg)  12/12/19 : 147 lb (66.7 kg)  10/19/19 : 148 lb (67.1 kg)  10/08/19 : 154 lb (69.9 kg)  07/11/19 : 148 lb (67.1 kg)  06/13/19 : 164 lb 8 oz (74.6 kg)  05/04/19 : 164 lb (74.4 kg)  04/23/19 : 163 lb 11.2 oz (74.3 kg)  04/09/19 : 165 lb 5.5 oz ( out rhabdomyolysis  · Gentle hydration, supportive care, reassurance  · Add potassium to IV fluids; check serum magnesium level  · Changed fluids to D5 with sodium and potassium; watch blood glucose closely  · Close monitoring vital signs, respiratory rate • CMV hepatitis ([facility] 1997   • [facility]    • Essential hypertension    • High blood pressure    • Hodgkin's disease ([facility] 1984    3B-chemo   • Neuropathy 1984   • Pancreatic insufficiency    • Personal history of antineoplastic chem Breast Cancer Maternal Grandmother [de-identified]        post menopausal   • Genetic Disease Other 5        Neurofibromatosis   • Other (non Hodgkin's lymphoma follicular) Sister 48   • Other (epilepsy) Son    • No Known Problems Daughter    • Cancer Self         hodg flextouch 6 Units, 6 Units, Subcutaneous, Daily  Insulin Aspart Pen (NOVOLOG) 100 UNIT/ML flexpen 2 Units, 2 Units, Subcutaneous, TID CC  Insulin Aspart Pen (NOVOLOG) 100 UNIT/ML flexpen 1-5 Units, 1-5 Units, Subcutaneous, TID CC      oxyCODONE-acetaminoph Able to give an animated history despite severe subjective symptoms.   HEENT: Normocephalic; pupils equally round and reactive to light; no temporal wasting; no thyromegaly or cervical lymphadenopathy  PULM: Speaking comfortably in complete sentences  CV: N

## 2020-04-06 NOTE — H&P
[facility]    PATIENT'S NAME: Florence Romeo [facility]   ATTENDING PHYSICIAN: Sarah Calhoun MD   PATIENT ACCOUNT#:   [id number]    LOCATION:  [place] [address]  MEDICAL RECORD #:   [id number]       YOB: 1965  ADMISSION DATE: pinning. MEDICATIONS:  Please see medication reconciliation list.    ALLERGIES:  Iodinated agents, contrast, and penicillin causes mouth sores. FAMILY HISTORY:  Mother had glaucoma and hypertension.   Father had coronary artery disease and hypertensio 12:09:05  t: 04/06/2020 12:12:15  [facility] [id number]  FB/

## 2020-04-06 NOTE — ED PROVIDER NOTES
Patient Seen in: [facility] 4w/sw/se    History   Patient presents with:  Poor Feed Anorexia    Stated Complaint: Anorexia    HPI    Patient complains of anorexia. Diagnosed with covid over a week ago. Notes gen malaise and loss of appetite.    ESOPHAGOGASTRODUODENOSCOPY (EGD) N/A 12/27/2018    Performed by Coco Blue MD at [place] [address]  2013    hip pinning for stress fracture   • FELISHA LOCALIZATION WIRE 1 SITE RIGHT (CPT=19281)      1994   • SPLENECTOMY  1984       Medi Use      Smoking status: Never Smoker      Smokeless tobacco: Never Used    Alcohol use: No    Drug use: No      Review of Systems    Positive for stated complaint: Anorexia  Other systems are as noted in HPI. Constitutional and vital signs reviewed. components within normal limits   CBC W/ DIFFERENTIAL - Abnormal; Notable for the following components:    WBC 13.6 (*)     .0 (*)     Lymphocyte Absolute 4.38 (*)     All other components within normal limits   CK CREATINE KINASE (NOT CREATININE) -

## 2020-04-07 PROCEDURE — 99226 SUBSEQUENT OBSERVATION CARE: CPT | Performed by: HOSPITALIST

## 2020-04-07 PROCEDURE — 99214 OFFICE O/P EST MOD 30 MIN: CPT | Performed by: INTERNAL MEDICINE

## 2020-04-07 NOTE — PLAN OF CARE
Patient alert and oriented. Seroquel added by Psych for complaints of insomnia, patient slept well throughout the night. IVF continued. Up to restroom with SB assist. Percocet PRN for pain to hip r/t fall at home. Bed alam on throughout the night.  Dinner n 0530 by Yosef Jane, RN  Outcome: Progressing  Goal: Patient/Family Short Term Goal  Description  Patient's Short Term Goal: Pain management    Interventions:   - PRN/Scheduled meds  - See additional Care Plan goals for specific interventions   4/7/202

## 2020-04-07 NOTE — BH PROGRESS NOTE
Social Visit. I have seen pt in the office in the past for anxiety. Mental Status Exam:   white female in hospital gown in hospital bed, with speech that is somewhat rapid, but normal volume and good eye contact.    Had to stop several times, for a few

## 2020-04-07 NOTE — PROGRESS NOTES
[facility]    Progress Note    Ajit Flowers Patient Status:  Observation    1965 MRN [id number]   Location [facility] 4W/SW/SE Attending Rashmi Murdock MD   Hosp Day # 0 PCP Floresita Vidal MD       Subjective:   D (Lip-Prot-Amyl)  25,000 Units Oral TID CC   • insulin detemir  6 Units Subcutaneous Daily   • Insulin Aspart Pen  2 Units Subcutaneous TID CC   • Insulin Aspart Pen  1-5 Units Subcutaneous TID CC   • QUEtiapine Fumarate  25 mg Oral Nightly     Normal Salin

## 2020-04-07 NOTE — PROGRESS NOTES
[facility]  GI SERVICE PROGRESS NOTE    Fela Waters Patient Status:  Observation    1965 MRN [id number]   Location [facility] 4W/SW/SE Attending Tierra Sutton MD   Hosp Day # 0 PCP Abdirahman Bolivar MD       [facility] GFRNAA 60 92   CA 9.9 8.8   ALB 4.5 3.0*    142   K 3.3* 4.0    111   CO2 23.0 25.0   ALKPHO 136* 93   AST 39* 21   ALT 41 25   BILT 0.5 0.3   TP 9.3* 6.5       No results for input(s): BAKARI, LIP in the last 168 hours.     Recent Labs   Lab 04/ supportive care, reassurance  · Percocet helping with severe myalgia bodily pains  · Added potassium to IV fluids; normal serum magnesium level; potassium better  · Close monitoring vital signs, respiratory rate, respiratory symptoms  · Recently completed

## 2020-04-07 NOTE — PLAN OF CARE
Problem: Diabetes/Glucose Control  Goal: Glucose maintained within prescribed range  Description  INTERVENTIONS:  - Monitor Blood Glucose as ordered  - Assess for signs and symptoms of hyperglycemia and hypoglycemia  - Administer ordered medications to m retention  Outcome: Progressing     Problem: GASTROINTESTINAL - ADULT  Goal: Minimal or absence of nausea and vomiting  Description  INTERVENTIONS:  - Maintain adequate hydration with IV or PO as ordered and tolerated  - Nasogastric tube to low intermitten

## 2020-04-07 NOTE — CONSULTS
[facility]    Report of Endocrinology Consultation  [facility]    Mahsa Domi Patient Status:  Observation    1965 MRN [id number]   Location [facility] 4W/SW/SE Attending Mau Jang, [address] [facility] Mala Myrick MD at [address] [place] [address] OR   • BREAST MASTOPEXY Left 2/29/2020    Performed by Marce Schaefer MD at [address] [place] [address] OR   • BREAST RECONSTRUCTION/ IMMEDIATE/EXPANDER Bilateral 12/23/2019    Performed by Marce Schaefer MD at [address] [place] [address] OR   • BREAST SENTI Intravenous, Q6H PRN  •  Metoclopramide HCl (REGLAN) injection 5 mg, 5 mg, Intravenous, TID AC and HS  •  diltiazem (CARDIZEM CD) 24 hr cap 360 mg, 360 mg, Oral, Daily  •  cholecalciferol (VITAMIN D3) cap/tab 1,000 Units, 1,000 Units, Oral, Daily  •  DULox Exam:  Blood pressure 103/75, pulse 64, temperature 98.7 °F (37.1 °C), temperature source Oral, resp. rate 18, weight 131 lb 15.8 oz (59.9 kg), SpO2 96 %.       Labs, Imaging and Ancillaries:    Lab Results   Component Value Date    WBC 7.8 04/07/2020    HG

## 2020-04-08 VITALS
WEIGHT: 132 LBS | OXYGEN SATURATION: 100 % | SYSTOLIC BLOOD PRESSURE: 126 MMHG | DIASTOLIC BLOOD PRESSURE: 72 MMHG | TEMPERATURE: 99 F | BODY MASS INDEX: 21 KG/M2 | RESPIRATION RATE: 20 BRPM | HEART RATE: 71 BPM

## 2020-04-08 PROCEDURE — 99214 OFFICE O/P EST MOD 30 MIN: CPT | Performed by: INTERNAL MEDICINE

## 2020-04-08 PROCEDURE — 99217 OBSERVATION CARE DISCHARGE: CPT | Performed by: HOSPITALIST

## 2020-04-08 RX ORDER — QUETIAPINE 25 MG/1
25 TABLET, FILM COATED ORAL NIGHTLY
Qty: 60 TABLET | Refills: 0 | Status: SHIPPED | OUTPATIENT
Start: 2020-04-08 | End: 2020-06-07

## 2020-04-08 RX ORDER — OXYCODONE HYDROCHLORIDE AND ACETAMINOPHEN 5; 325 MG/1; MG/1
1 TABLET ORAL EVERY 4 HOURS PRN
Qty: 30 TABLET | Refills: 0 | Status: SHIPPED | OUTPATIENT
Start: 2020-04-08 | End: 2020-06-09

## 2020-04-08 NOTE — PLAN OF CARE
Patient alert and oriented. Seroquel given in two doses for insomnia, patient tolerated well. IVF continued. Up to restroom independently. Voiding WNL. Percocet PRN for pain. . Scheduled Reglan in place. Accuchecks ACHS. On Room air.  Plan for home when me Goal  Description  Patient's Short Term Goal: Pain management    Interventions:   - PRN/Scheduled meds  - See additional Care Plan goals for specific interventions   4/7/2020 0532 by Calvin Bourgeois RN  Outcome: Progressing  4/7/2020 0530 by Meredith Linares

## 2020-04-08 NOTE — BH PROGRESS NOTE
Spoke with RN, reviewed notes, interviewed pt. She reports sleeping much better last night. She was woken several times per portocol for vital checks, but was able to return to sleep easily. Energy: better today.    Nash: little, but making herself

## 2020-04-08 NOTE — PROGRESS NOTES
[facility]    Endocrine Progress Note  [facility]    Candido Roberts [facility] Patient Status:  Observation    1965 MRN [id number]   Location [facility] 4W/SW/SE Attending Talon Gu MD   Hosp Day # 0 PCP J (DEX4) oral liquid 30 g, 30 g, Oral, Q15 Min PRN **OR** Glucose-Vitamin C (DEX-4) chewable tab 8 tablet, 8 tablet, Oral, Q15 Min PRN  •  insulin detemir (LEVEMIR) 100 UNIT/ML flextouch 6 Units, 6 Units, Subcutaneous, Daily  •  Insulin Aspart Pen (NOVOLOG)

## 2020-04-08 NOTE — DISCHARGE SUMMARY
[facility]    Discharge Summary    [address] Patient Status:  Observation    1965 MRN [id number]   Location [facility] 4W/SW/SE Attending Ladi Stewart MD   Hosp Day # 0 PCP Enedina Cabrera MD     Date of Ad CHIEF COMPLAINT:  Dehydration, nausea, vomiting. HISTORY OF PRESENT ILLNESS:  The patient is a 28-year-old  female with recent history of breast cancer, status post bilateral mastectomies and type 1 diabetes mellitus.   Recently tested positive fo Creon 45632-33689 units Cpep  Generic drug:  Pancrelipase (Lip-Prot-Amyl)  TAKE 3 CAPSULES BY MOUTH 3 (THREE) TIMES DAILY WITH MEALS.      DULoxetine HCl 60 MG Cpep  Commonly known as:  CYMBALTA     * Fiasp FlexTouch 100 UNIT/ML Sopn  Generic drug:  Insulin

## 2020-04-08 NOTE — PROGRESS NOTES
[facility]  GI SERVICE PROGRESS NOTE    Malissa Ortega Patient Status:  Observation    1965 MRN [id number]   Location [facility] 4W/SW/SE Attending Joanna Murrell MD   Hosp Day # 0 PCP MD Petty Harding 6.5  --        No results for input(s): BAKARI, LIP in the last 168 hours. Recent Labs   Lab 04/06/20  0958 04/06/20  1621 04/07/20  0543   CRP  --  0.49* 0.69*   MG 2.4  --   --        No results for input(s): URINE, CULTI, BLDSMR in the last 168 hours. discharge  · Continue metoclopramide twice daily as before on discharge; wean off next 1-2 weeks    Ready for DC home today per GI service      Over 40 minutes spent today reviewing today's and recent data; discussing the above and counseling this patient,

## 2020-04-17 ENCOUNTER — TELEPHONE (OUTPATIENT)
Dept: SURGERY | Facility: CLINIC | Age: 55
End: 2020-04-17

## 2020-04-24 ENCOUNTER — VIRTUAL PHONE E/M (OUTPATIENT)
Dept: SURGERY | Facility: CLINIC | Age: 55
End: 2020-04-24
Payer: COMMERCIAL

## 2020-04-24 DIAGNOSIS — Z90.13 ABSENCE OF BOTH BREASTS: Primary | ICD-10-CM

## 2020-04-24 PROCEDURE — 99024 POSTOP FOLLOW-UP VISIT: CPT | Performed by: SURGERY

## 2020-04-24 NOTE — PROGRESS NOTES
This visit is conducted using telemedicine with telephone (audio). Dragan Cline verbally consented to  a Virtual/Telephone Check-In service on 04/24/20.   Patient understands and accepts financial responsibility for any deductible, co-insur

## 2020-04-24 NOTE — PROGRESS NOTES
Surgeon:              Dr. Krista Martin.  Brie Diaz, PhD                                          Tel:         [phone]                                  Fax:        [phone]    Surgery/Procedure:      Left breast tissue expander placement, possible ADM  1.5 ho

## 2020-05-06 ENCOUNTER — TELEPHONE (OUTPATIENT)
Dept: HEMATOLOGY/ONCOLOGY | Facility: HOSPITAL | Age: 55
End: 2020-05-06

## 2020-05-13 RX ORDER — BENZONATATE 200 MG/1
CAPSULE ORAL
Qty: 30 CAPSULE | Refills: 9 | Status: SHIPPED | OUTPATIENT
Start: 2020-05-13 | End: 2020-06-09 | Stop reason: ALTCHOICE

## 2020-05-29 ENCOUNTER — VIRTUAL PHONE E/M (OUTPATIENT)
Dept: SURGERY | Facility: CLINIC | Age: 55
End: 2020-05-29
Payer: COMMERCIAL

## 2020-05-29 ENCOUNTER — DOCUMENTATION ONLY (OUTPATIENT)
Dept: SURGERY | Facility: CLINIC | Age: 55
End: 2020-05-29

## 2020-05-29 DIAGNOSIS — Z90.13 ABSENCE OF BOTH BREASTS: Primary | ICD-10-CM

## 2020-05-29 PROCEDURE — 99213 OFFICE O/P EST LOW 20 MIN: CPT | Performed by: SURGERY

## 2020-05-29 NOTE — PROGRESS NOTES
Estabilshed Patient Consultation    Chief Complaint: absence of both breasts  This visit is conducted using telemedicine with telephone (audio). Ca Cline verbally consented to a Virtual/Telephone Check-In service on 05/29/20.     History COLONOSCOPY  3/21/2014   • COLONOSCOPY N/A 10/19/2019    Performed by Arthur Ley MD at [address] [place] [address] ENDOSCOPY   • ESOPHAGOGASTRODUODENOSCOPY (EGD) N/A 10/19/2019    Performed by Arthur Ley MD at [address] [place] [address] ENDOSCOPY   • ESOPHAGOGASTRODUODENOSCOPY (EG We discussed option for TE placement vs flap reconstruction. Pt reports lots of painful scar tissue in L chest wall but would like to try TE (550 med wei) again before considering flap options.  She is schedule for mid June and will undergo medical cl

## 2020-05-29 NOTE — PATIENT INSTRUCTIONS
Surgeon:              Dr. Syl Nguyen.  Ifeoma Hastings, PhD                                          Tel:         [phone]                                  Fax:        [phone]    Surgery/Procedure:                Left breast tissue expander placement, possible A

## 2020-05-29 NOTE — PROGRESS NOTES
Consent packet and patient instruction sheet was sent via secure email to the patient following the telephone pre-op appointment. Patient instructed to complete packet and bring it the day of surgery.

## 2020-06-02 ENCOUNTER — TELEPHONE (OUTPATIENT)
Dept: SURGERY | Facility: CLINIC | Age: 55
End: 2020-06-02

## 2020-06-02 NOTE — TELEPHONE ENCOUNTER
Called patients primary care physicians office to see if they can offer her a virtual visit per Dr. Vinicio Li request. They stated she hasn't been in the office in 5+ years, so she has to be seen to obtain medical clearance.  I reminded them I sent the medical

## 2020-06-03 ENCOUNTER — TELEPHONE (OUTPATIENT)
Dept: SURGERY | Facility: CLINIC | Age: 55
End: 2020-06-03

## 2020-06-03 NOTE — TELEPHONE ENCOUNTER
LVM to inform patient that getting medical clearance by Dr. Kan Vogt via virtual visit will be valid. No repeat labs or EKG needed. I sent the request to [place] [address] office and will call their office tomorrow since they were closed today.

## 2020-06-04 DIAGNOSIS — Z90.13 ABSENCE OF BOTH BREASTS: Primary | ICD-10-CM

## 2020-06-04 DIAGNOSIS — Z17.0 MALIGNANT NEOPLASM OF UPPER-OUTER QUADRANT OF RIGHT BREAST IN FEMALE, ESTROGEN RECEPTOR POSITIVE (HCC): ICD-10-CM

## 2020-06-04 DIAGNOSIS — C50.411 MALIGNANT NEOPLASM OF UPPER-OUTER QUADRANT OF RIGHT BREAST IN FEMALE, ESTROGEN RECEPTOR POSITIVE (HCC): ICD-10-CM

## 2020-06-04 DIAGNOSIS — Z90.13 ABSENCE OF BREAST, BILATERAL: ICD-10-CM

## 2020-06-09 ENCOUNTER — TELEPHONE (OUTPATIENT)
Dept: SURGERY | Facility: CLINIC | Age: 55
End: 2020-06-09

## 2020-06-09 NOTE — TELEPHONE ENCOUNTER
Received medical clearance by patients endocrinologist, Dr. Dariana Villasenor. Will upload to patients chart via STAT scanning.

## 2020-06-09 NOTE — TELEPHONE ENCOUNTER
Called Dr. Cynthia Machuca office to get an update on the medical clearance note for her upcoming surgery. They faxed it, but see now that the fax failed. They are going to re-send the letter.

## 2020-06-11 ENCOUNTER — ANESTHESIA (OUTPATIENT)
Dept: SURGERY | Facility: HOSPITAL | Age: 55
End: 2020-06-11
Payer: COMMERCIAL

## 2020-06-11 ENCOUNTER — ANESTHESIA EVENT (OUTPATIENT)
Dept: SURGERY | Facility: HOSPITAL | Age: 55
End: 2020-06-11
Payer: COMMERCIAL

## 2020-06-11 ENCOUNTER — HOSPITAL ENCOUNTER (OUTPATIENT)
Facility: HOSPITAL | Age: 55
Setting detail: HOSPITAL OUTPATIENT SURGERY
Discharge: HOME | End: 2020-06-11
Attending: SURGERY | Admitting: SURGERY
Payer: COMMERCIAL

## 2020-06-11 VITALS
TEMPERATURE: 97 F | OXYGEN SATURATION: 98 % | RESPIRATION RATE: 14 BRPM | HEART RATE: 97 BPM | WEIGHT: 137 LBS | HEIGHT: 67 IN | SYSTOLIC BLOOD PRESSURE: 136 MMHG | DIASTOLIC BLOOD PRESSURE: 84 MMHG | BODY MASS INDEX: 21.5 KG/M2

## 2020-06-11 DIAGNOSIS — Z90.13 ABSENCE OF BREAST, BILATERAL: ICD-10-CM

## 2020-06-11 DIAGNOSIS — C50.411 MALIGNANT NEOPLASM OF UPPER-OUTER QUADRANT OF RIGHT BREAST IN FEMALE, ESTROGEN RECEPTOR POSITIVE (HCC): ICD-10-CM

## 2020-06-11 DIAGNOSIS — Z17.0 MALIGNANT NEOPLASM OF UPPER-OUTER QUADRANT OF RIGHT BREAST IN FEMALE, ESTROGEN RECEPTOR POSITIVE (HCC): ICD-10-CM

## 2020-06-11 PROCEDURE — 82962 GLUCOSE BLOOD TEST: CPT

## 2020-06-11 PROCEDURE — S0077 INJECTION, CLINDAMYCIN PHOSP: HCPCS

## 2020-06-11 PROCEDURE — 88305 TISSUE EXAM BY PATHOLOGIST: CPT | Performed by: SURGERY

## 2020-06-11 PROCEDURE — 0HHU0NZ INSERTION OF TISSUE EXPANDER INTO LEFT BREAST, OPEN APPROACH: ICD-10-PCS | Performed by: SURGERY

## 2020-06-11 PROCEDURE — S0077 INJECTION, CLINDAMYCIN PHOSP: HCPCS | Performed by: ANESTHESIOLOGY

## 2020-06-11 RX ORDER — DOCUSATE SODIUM 100 MG/1
100 CAPSULE, LIQUID FILLED ORAL 2 TIMES DAILY PRN
Qty: 20 CAPSULE | Refills: 1 | Status: SHIPPED | OUTPATIENT
Start: 2020-06-11 | End: 2020-08-05

## 2020-06-11 RX ORDER — HYDROMORPHONE HYDROCHLORIDE 1 MG/ML
0.6 INJECTION, SOLUTION INTRAMUSCULAR; INTRAVENOUS; SUBCUTANEOUS EVERY 5 MIN PRN
Status: DISCONTINUED | OUTPATIENT
Start: 2020-06-11 | End: 2020-06-11

## 2020-06-11 RX ORDER — ONDANSETRON 4 MG/1
4 TABLET, FILM COATED ORAL EVERY 8 HOURS PRN
Qty: 20 TABLET | Refills: 1 | Status: SHIPPED | OUTPATIENT
Start: 2020-06-11 | End: 2020-08-05

## 2020-06-11 RX ORDER — NEOSTIGMINE METHYLSULFATE 1 MG/ML
INJECTION INTRAVENOUS AS NEEDED
Status: DISCONTINUED | OUTPATIENT
Start: 2020-06-11 | End: 2020-06-11 | Stop reason: SURG

## 2020-06-11 RX ORDER — SULFAMETHOXAZOLE AND TRIMETHOPRIM 800; 160 MG/1; MG/1
1 TABLET ORAL 2 TIMES DAILY
Qty: 20 TABLET | Refills: 0 | Status: SHIPPED | OUTPATIENT
Start: 2020-06-11 | End: 2020-06-21

## 2020-06-11 RX ORDER — PROCHLORPERAZINE EDISYLATE 5 MG/ML
5 INJECTION INTRAMUSCULAR; INTRAVENOUS ONCE AS NEEDED
Status: DISCONTINUED | OUTPATIENT
Start: 2020-06-11 | End: 2020-06-11

## 2020-06-11 RX ORDER — MORPHINE SULFATE 4 MG/ML
4 INJECTION, SOLUTION INTRAMUSCULAR; INTRAVENOUS EVERY 10 MIN PRN
Status: DISCONTINUED | OUTPATIENT
Start: 2020-06-11 | End: 2020-06-11

## 2020-06-11 RX ORDER — LIDOCAINE HYDROCHLORIDE AND EPINEPHRINE 10; 10 MG/ML; UG/ML
INJECTION, SOLUTION INFILTRATION; PERINEURAL AS NEEDED
Status: DISCONTINUED | OUTPATIENT
Start: 2020-06-11 | End: 2020-06-11 | Stop reason: HOSPADM

## 2020-06-11 RX ORDER — SODIUM CHLORIDE, SODIUM LACTATE, POTASSIUM CHLORIDE, CALCIUM CHLORIDE 600; 310; 30; 20 MG/100ML; MG/100ML; MG/100ML; MG/100ML
INJECTION, SOLUTION INTRAVENOUS CONTINUOUS
Status: DISCONTINUED | OUTPATIENT
Start: 2020-06-11 | End: 2020-06-11

## 2020-06-11 RX ORDER — CLINDAMYCIN PHOSPHATE 150 MG/ML
INJECTION, SOLUTION INTRAVENOUS AS NEEDED
Status: DISCONTINUED | OUTPATIENT
Start: 2020-06-11 | End: 2020-06-11 | Stop reason: SURG

## 2020-06-11 RX ORDER — METOCLOPRAMIDE 10 MG/1
10 TABLET ORAL ONCE
Status: DISCONTINUED | OUTPATIENT
Start: 2020-06-11 | End: 2020-06-11 | Stop reason: HOSPADM

## 2020-06-11 RX ORDER — FAMOTIDINE 20 MG/1
20 TABLET ORAL ONCE
Status: DISCONTINUED | OUTPATIENT
Start: 2020-06-11 | End: 2020-06-11 | Stop reason: HOSPADM

## 2020-06-11 RX ORDER — NALOXONE HYDROCHLORIDE 0.4 MG/ML
80 INJECTION, SOLUTION INTRAMUSCULAR; INTRAVENOUS; SUBCUTANEOUS AS NEEDED
Status: DISCONTINUED | OUTPATIENT
Start: 2020-06-11 | End: 2020-06-11

## 2020-06-11 RX ORDER — ONDANSETRON 2 MG/ML
4 INJECTION INTRAMUSCULAR; INTRAVENOUS ONCE AS NEEDED
Status: DISCONTINUED | OUTPATIENT
Start: 2020-06-11 | End: 2020-06-11

## 2020-06-11 RX ORDER — ACETAMINOPHEN 500 MG
1000 TABLET ORAL ONCE
Status: COMPLETED | OUTPATIENT
Start: 2020-06-11 | End: 2020-06-11

## 2020-06-11 RX ORDER — LEVOFLOXACIN 750 MG/1
750 TABLET ORAL DAILY
Qty: 10 TABLET | Refills: 0 | Status: SHIPPED | OUTPATIENT
Start: 2020-06-11 | End: 2020-06-11

## 2020-06-11 RX ORDER — HYDROMORPHONE HYDROCHLORIDE 1 MG/ML
0.4 INJECTION, SOLUTION INTRAMUSCULAR; INTRAVENOUS; SUBCUTANEOUS EVERY 5 MIN PRN
Status: DISCONTINUED | OUTPATIENT
Start: 2020-06-11 | End: 2020-06-11

## 2020-06-11 RX ORDER — OXYCODONE HYDROCHLORIDE AND ACETAMINOPHEN 5; 325 MG/1; MG/1
2 TABLET ORAL EVERY 6 HOURS PRN
Status: DISCONTINUED | OUTPATIENT
Start: 2020-06-11 | End: 2020-06-11

## 2020-06-11 RX ORDER — DEXAMETHASONE SODIUM PHOSPHATE 4 MG/ML
VIAL (ML) INJECTION AS NEEDED
Status: DISCONTINUED | OUTPATIENT
Start: 2020-06-11 | End: 2020-06-11 | Stop reason: SURG

## 2020-06-11 RX ORDER — LIDOCAINE HYDROCHLORIDE 10 MG/ML
INJECTION, SOLUTION EPIDURAL; INFILTRATION; INTRACAUDAL; PERINEURAL AS NEEDED
Status: DISCONTINUED | OUTPATIENT
Start: 2020-06-11 | End: 2020-06-11 | Stop reason: SURG

## 2020-06-11 RX ORDER — MORPHINE SULFATE 4 MG/ML
2 INJECTION, SOLUTION INTRAMUSCULAR; INTRAVENOUS EVERY 10 MIN PRN
Status: DISCONTINUED | OUTPATIENT
Start: 2020-06-11 | End: 2020-06-11

## 2020-06-11 RX ORDER — CLINDAMYCIN PHOSPHATE 900 MG/50ML
900 INJECTION INTRAVENOUS ONCE
Status: DISCONTINUED | OUTPATIENT
Start: 2020-06-11 | End: 2020-06-11 | Stop reason: HOSPADM

## 2020-06-11 RX ORDER — HYDROMORPHONE HYDROCHLORIDE 1 MG/ML
0.2 INJECTION, SOLUTION INTRAMUSCULAR; INTRAVENOUS; SUBCUTANEOUS EVERY 5 MIN PRN
Status: DISCONTINUED | OUTPATIENT
Start: 2020-06-11 | End: 2020-06-11

## 2020-06-11 RX ORDER — OXYCODONE HYDROCHLORIDE AND ACETAMINOPHEN 5; 325 MG/1; MG/1
1-2 TABLET ORAL EVERY 6 HOURS PRN
Qty: 40 TABLET | Refills: 0 | Status: SHIPPED | OUTPATIENT
Start: 2020-06-11 | End: 2020-07-06 | Stop reason: ALTCHOICE

## 2020-06-11 RX ORDER — HYDROCODONE BITARTRATE AND ACETAMINOPHEN 5; 325 MG/1; MG/1
1 TABLET ORAL AS NEEDED
Status: DISCONTINUED | OUTPATIENT
Start: 2020-06-11 | End: 2020-06-11

## 2020-06-11 RX ORDER — HALOPERIDOL 5 MG/ML
0.25 INJECTION INTRAMUSCULAR ONCE AS NEEDED
Status: DISCONTINUED | OUTPATIENT
Start: 2020-06-11 | End: 2020-06-11

## 2020-06-11 RX ORDER — GLYCOPYRROLATE 0.2 MG/ML
INJECTION, SOLUTION INTRAMUSCULAR; INTRAVENOUS AS NEEDED
Status: DISCONTINUED | OUTPATIENT
Start: 2020-06-11 | End: 2020-06-11 | Stop reason: SURG

## 2020-06-11 RX ORDER — LIDOCAINE HYDROCHLORIDE 40 MG/ML
SOLUTION TOPICAL AS NEEDED
Status: DISCONTINUED | OUTPATIENT
Start: 2020-06-11 | End: 2020-06-11 | Stop reason: SURG

## 2020-06-11 RX ORDER — MORPHINE SULFATE 10 MG/ML
6 INJECTION, SOLUTION INTRAMUSCULAR; INTRAVENOUS EVERY 10 MIN PRN
Status: DISCONTINUED | OUTPATIENT
Start: 2020-06-11 | End: 2020-06-11

## 2020-06-11 RX ORDER — LEVOFLOXACIN 500 MG/1
500 TABLET, FILM COATED ORAL DAILY
Qty: 10 TABLET | Refills: 0 | Status: SHIPPED | OUTPATIENT
Start: 2020-06-11 | End: 2020-06-21

## 2020-06-11 RX ORDER — DEXTROSE MONOHYDRATE 25 G/50ML
50 INJECTION, SOLUTION INTRAVENOUS
Status: DISCONTINUED | OUTPATIENT
Start: 2020-06-11 | End: 2020-06-11

## 2020-06-11 RX ORDER — ONDANSETRON 2 MG/ML
INJECTION INTRAMUSCULAR; INTRAVENOUS AS NEEDED
Status: DISCONTINUED | OUTPATIENT
Start: 2020-06-11 | End: 2020-06-11 | Stop reason: SURG

## 2020-06-11 RX ORDER — MIDAZOLAM HYDROCHLORIDE 1 MG/ML
INJECTION INTRAMUSCULAR; INTRAVENOUS AS NEEDED
Status: DISCONTINUED | OUTPATIENT
Start: 2020-06-11 | End: 2020-06-11 | Stop reason: SURG

## 2020-06-11 RX ORDER — DOCUSATE SODIUM 100 MG/1
100 CAPSULE, LIQUID FILLED ORAL 2 TIMES DAILY PRN
Qty: 20 CAPSULE | Refills: 1 | Status: SHIPPED | OUTPATIENT
Start: 2020-06-11 | End: 2020-06-11

## 2020-06-11 RX ORDER — ROCURONIUM BROMIDE 10 MG/ML
INJECTION, SOLUTION INTRAVENOUS AS NEEDED
Status: DISCONTINUED | OUTPATIENT
Start: 2020-06-11 | End: 2020-06-11 | Stop reason: SURG

## 2020-06-11 RX ORDER — HYDROCODONE BITARTRATE AND ACETAMINOPHEN 5; 325 MG/1; MG/1
2 TABLET ORAL AS NEEDED
Status: DISCONTINUED | OUTPATIENT
Start: 2020-06-11 | End: 2020-06-11

## 2020-06-11 RX ADMIN — LIDOCAINE HYDROCHLORIDE 30 MG: 10 INJECTION, SOLUTION EPIDURAL; INFILTRATION; INTRACAUDAL; PERINEURAL at 07:38:00

## 2020-06-11 RX ADMIN — ONDANSETRON 4 MG: 2 INJECTION INTRAMUSCULAR; INTRAVENOUS at 07:38:00

## 2020-06-11 RX ADMIN — DEXAMETHASONE SODIUM PHOSPHATE 4 MG: 4 MG/ML VIAL (ML) INJECTION at 07:38:00

## 2020-06-11 RX ADMIN — GLYCOPYRROLATE 0.6 MG: 0.2 INJECTION, SOLUTION INTRAMUSCULAR; INTRAVENOUS at 08:47:00

## 2020-06-11 RX ADMIN — SODIUM CHLORIDE, SODIUM LACTATE, POTASSIUM CHLORIDE, CALCIUM CHLORIDE: 600; 310; 30; 20 INJECTION, SOLUTION INTRAVENOUS at 07:38:00

## 2020-06-11 RX ADMIN — ROCURONIUM BROMIDE 30 MG: 10 INJECTION, SOLUTION INTRAVENOUS at 07:48:00

## 2020-06-11 RX ADMIN — LIDOCAINE HYDROCHLORIDE 4 ML: 40 SOLUTION TOPICAL at 07:38:00

## 2020-06-11 RX ADMIN — CLINDAMYCIN PHOSPHATE 900 MG: 150 INJECTION, SOLUTION INTRAVENOUS at 07:38:00

## 2020-06-11 RX ADMIN — NEOSTIGMINE METHYLSULFATE 3 MG: 1 INJECTION INTRAVENOUS at 08:47:00

## 2020-06-11 RX ADMIN — MIDAZOLAM HYDROCHLORIDE 2 MG: 1 INJECTION INTRAMUSCULAR; INTRAVENOUS at 07:38:00

## 2020-06-11 NOTE — ANESTHESIA PROCEDURE NOTES
Airway  Date/Time: 6/11/2020 7:40 AM  Urgency: Elective    Airway not difficult    General Information and Staff    Patient location during procedure: OR  Anesthesiologist: Damaris Cintron MD  Performed: anesthesiologist     Indications and Patient Conditio

## 2020-06-11 NOTE — ANESTHESIA PREPROCEDURE EVALUATION
Anesthesia PreOp Note    HPI:     Mary Jo Parson is a 47year old female who presents for preoperative consultation requested by: Amish Vargas MD    Date of Surgery: 6/11/2020    Procedure(s):  BREAST RECONSTRUCTION/ IMMEDIATE/EXPANDER  Indica 04/07/2019      Chest pain, atypical         Date Noted: 12/26/2018      Viral warts         Date Noted: 06/09/2016      Controlled type 2 diabetes mellitus without complication, without long-term current use of insulin ([facility]         Date Noted: 03/06/2015 10/19/2019    Performed by Serg Vargas MD at [address] [place] [address] ENDOSCOPY   • ESOPHAGOGASTRODUODENOSCOPY (EGD) N/A 12/27/2018    Performed by Serg Vargas MD at [place] [address]  2013    hip pinning for stress fracture,right   • LUMPECTOMY MD    No current [facility]-ordered outpatient medications on file.         Iodinated Diagnosti*    HIVES    Comment:Other reaction(s): IODINATED CONTRAST MEDIA - IV             DYE  Penicillins             OTHER (SEE COMMENTS)    Comment:MOUTH SORES    Family Hi partner: Not on file        Emotionally abused: Not on file        Physically abused: Not on file        Forced sexual activity: Not on file    Other Topics      Concerns:         Service: Not Asked        Blood Transfusions: Not Asked        Caffe Anesthesia Evaluation     Patient summary reviewed and Nursing notes reviewed    Airway   Mallampati: II  TM distance: >3 FB  Neck ROM: full  Dental      Pulmonary - normal exam     ROS comment: Covid + 4/2020  Cardiovascular - normal exam  (+) hypertensio

## 2020-06-11 NOTE — BRIEF OP NOTE
Pre-Operative Diagnosis: Absence of breast, bilateral [Z90.13]  Malignant neoplasm of upper-outer quadrant of right breast in female, estrogen receptor positive ([facility] [C50.411, Z17.0]     Post-Operative Diagnosis: Absence of breast, bilateral [Z90.13]Corry

## 2020-06-11 NOTE — ANESTHESIA POSTPROCEDURE EVALUATION
Patient: Mahsa Duron    Procedure Summary     Date:  06/11/20 Room / Location:  [facility] OR 02 / [facility] OR    Anesthesia Start:  2728 Anesthesia Stop:  0078    Procedure:  BREAST RECONSTRUCTION/ IMMEDIATE/EXPANDER (Left Breast) Diagnosis:

## 2020-06-11 NOTE — H&P
History of Present Illness:   Lucas Brooks is a 47year old female who underwent BL mastectomy and TE placement. She had L TE infection requiring removal. She now presents for insertion of L TE. She has 550cc medium wei TE on her  R side. ENDOSCOPY   • ESOPHAGOGASTRODUODENOSCOPY (EGD) N/A 12/27/2018     Performed by Rosalinda Rodríguez MD at [place] [address]   2013     hip pinning for stress fracture   • FELISHA LOCALIZATION WIRE 1 SITE RIGHT (CPT=19281)         1994   • SPLENECT reconstruction were discussed. Patient reports lots of painful scar tissue in L chest wall but would like to try TE (550 med wei) again before considering flap options. Plan is for left tissue expander placement, possible ADM.  Medical clearance from

## 2020-06-12 NOTE — OPERATIVE REPORT
[facility]    PATIENT'S NAME: BRI HERRERA   ATTENDING PHYSICIAN: Florence Landry MD   OPERATING PHYSICIAN: Florence Landry MD   PATIENT ACCOUNT#:   [id number]    LOCATION:  [facility] 10  MEDICAL RECORD #:   [id number]       DA excising the old scar. This was sent to Pathology. Then, electrocautery was used to elevate the mastectomy flaps from the pectoralis muscle. There were several areas of dense scar tissue toward the chest wall.   Those were excised laterally and inferiorl

## 2020-06-18 ENCOUNTER — ANESTHESIA EVENT (OUTPATIENT)
Dept: SURGERY | Facility: HOSPITAL | Age: 55
DRG: 909 | End: 2020-06-18
Payer: COMMERCIAL

## 2020-06-18 ENCOUNTER — ANESTHESIA (OUTPATIENT)
Dept: SURGERY | Facility: HOSPITAL | Age: 55
DRG: 909 | End: 2020-06-18
Payer: COMMERCIAL

## 2020-06-18 ENCOUNTER — HOSPITAL ENCOUNTER (INPATIENT)
Facility: HOSPITAL | Age: 55
LOS: 1 days | Discharge: HOME | DRG: 909 | End: 2020-06-19
Attending: EMERGENCY MEDICINE | Admitting: HOSPITALIST
Payer: COMMERCIAL

## 2020-06-18 DIAGNOSIS — Z90.13 ABSENCE OF BOTH BREASTS: ICD-10-CM

## 2020-06-18 DIAGNOSIS — T14.8XXA BLEEDING FROM WOUND: Primary | ICD-10-CM

## 2020-06-18 PROBLEM — N64.89 BREAST HEMATOMA: Status: ACTIVE | Noted: 2020-06-18

## 2020-06-18 PROCEDURE — P9045 ALBUMIN (HUMAN), 5%, 250 ML: HCPCS | Performed by: ANESTHESIOLOGY

## 2020-06-18 PROCEDURE — 99222 1ST HOSP IP/OBS MODERATE 55: CPT | Performed by: HOSPITALIST

## 2020-06-18 PROCEDURE — 0HCU0ZZ EXTIRPATION OF MATTER FROM LEFT BREAST, OPEN APPROACH: ICD-10-PCS | Performed by: SURGERY

## 2020-06-18 PROCEDURE — 0HHU0NZ INSERTION OF TISSUE EXPANDER INTO LEFT BREAST, OPEN APPROACH: ICD-10-PCS | Performed by: SURGERY

## 2020-06-18 PROCEDURE — 0HPU0NZ REMOVAL OF TISSUE EXPANDER FROM LEFT BREAST, OPEN APPROACH: ICD-10-PCS | Performed by: SURGERY

## 2020-06-18 PROCEDURE — 36430 TRANSFUSION BLD/BLD COMPNT: CPT | Performed by: ANESTHESIOLOGY

## 2020-06-18 RX ORDER — HYDROMORPHONE HYDROCHLORIDE 1 MG/ML
0.4 INJECTION, SOLUTION INTRAMUSCULAR; INTRAVENOUS; SUBCUTANEOUS EVERY 5 MIN PRN
Status: DISCONTINUED | OUTPATIENT
Start: 2020-06-18 | End: 2020-06-18 | Stop reason: HOSPADM

## 2020-06-18 RX ORDER — OXYCODONE HYDROCHLORIDE AND ACETAMINOPHEN 5; 325 MG/1; MG/1
1 TABLET ORAL EVERY 6 HOURS PRN
Status: DISCONTINUED | OUTPATIENT
Start: 2020-06-18 | End: 2020-06-19

## 2020-06-18 RX ORDER — EPHEDRINE SULFATE 50 MG/ML
INJECTION, SOLUTION INTRAVENOUS AS NEEDED
Status: DISCONTINUED | OUTPATIENT
Start: 2020-06-18 | End: 2020-06-18 | Stop reason: SURG

## 2020-06-18 RX ORDER — OXYCODONE HYDROCHLORIDE AND ACETAMINOPHEN 5; 325 MG/1; MG/1
1 TABLET ORAL EVERY 4 HOURS PRN
Status: CANCELLED | OUTPATIENT
Start: 2020-06-18

## 2020-06-18 RX ORDER — ONDANSETRON 2 MG/ML
INJECTION INTRAMUSCULAR; INTRAVENOUS AS NEEDED
Status: DISCONTINUED | OUTPATIENT
Start: 2020-06-18 | End: 2020-06-18 | Stop reason: SURG

## 2020-06-18 RX ORDER — MORPHINE SULFATE 2 MG/ML
2 INJECTION, SOLUTION INTRAMUSCULAR; INTRAVENOUS EVERY 2 HOUR PRN
Status: DISCONTINUED | OUTPATIENT
Start: 2020-06-18 | End: 2020-06-18

## 2020-06-18 RX ORDER — HYDROMORPHONE HYDROCHLORIDE 1 MG/ML
0.6 INJECTION, SOLUTION INTRAMUSCULAR; INTRAVENOUS; SUBCUTANEOUS EVERY 5 MIN PRN
Status: DISCONTINUED | OUTPATIENT
Start: 2020-06-18 | End: 2020-06-18 | Stop reason: HOSPADM

## 2020-06-18 RX ORDER — MORPHINE SULFATE 10 MG/ML
6 INJECTION, SOLUTION INTRAMUSCULAR; INTRAVENOUS EVERY 10 MIN PRN
Status: DISCONTINUED | OUTPATIENT
Start: 2020-06-18 | End: 2020-06-18 | Stop reason: HOSPADM

## 2020-06-18 RX ORDER — NEOSTIGMINE METHYLSULFATE 1 MG/ML
INJECTION INTRAVENOUS AS NEEDED
Status: DISCONTINUED | OUTPATIENT
Start: 2020-06-18 | End: 2020-06-18 | Stop reason: SURG

## 2020-06-18 RX ORDER — MORPHINE SULFATE 4 MG/ML
6 INJECTION, SOLUTION INTRAMUSCULAR; INTRAVENOUS EVERY 2 HOUR PRN
Status: DISCONTINUED | OUTPATIENT
Start: 2020-06-18 | End: 2020-06-19

## 2020-06-18 RX ORDER — DOCUSATE SODIUM 100 MG/1
100 CAPSULE, LIQUID FILLED ORAL 2 TIMES DAILY
Status: DISCONTINUED | OUTPATIENT
Start: 2020-06-19 | End: 2020-06-19

## 2020-06-18 RX ORDER — SODIUM CHLORIDE, SODIUM LACTATE, POTASSIUM CHLORIDE, CALCIUM CHLORIDE 600; 310; 30; 20 MG/100ML; MG/100ML; MG/100ML; MG/100ML
INJECTION, SOLUTION INTRAVENOUS CONTINUOUS
Status: DISCONTINUED | OUTPATIENT
Start: 2020-06-18 | End: 2020-06-19

## 2020-06-18 RX ORDER — SODIUM CHLORIDE 0.9 % (FLUSH) 0.9 %
3 SYRINGE (ML) INJECTION AS NEEDED
Status: DISCONTINUED | OUTPATIENT
Start: 2020-06-18 | End: 2020-06-19

## 2020-06-18 RX ORDER — MORPHINE SULFATE 4 MG/ML
4 INJECTION, SOLUTION INTRAMUSCULAR; INTRAVENOUS EVERY 10 MIN PRN
Status: DISCONTINUED | OUTPATIENT
Start: 2020-06-18 | End: 2020-06-18 | Stop reason: HOSPADM

## 2020-06-18 RX ORDER — SODIUM CHLORIDE 9 MG/ML
INJECTION, SOLUTION INTRAVENOUS CONTINUOUS
Status: DISCONTINUED | OUTPATIENT
Start: 2020-06-18 | End: 2020-06-18 | Stop reason: HOSPADM

## 2020-06-18 RX ORDER — ONDANSETRON 2 MG/ML
4 INJECTION INTRAMUSCULAR; INTRAVENOUS EVERY 6 HOURS PRN
Status: DISCONTINUED | OUTPATIENT
Start: 2020-06-18 | End: 2020-06-18

## 2020-06-18 RX ORDER — OXYCODONE HYDROCHLORIDE AND ACETAMINOPHEN 5; 325 MG/1; MG/1
1-2 TABLET ORAL EVERY 6 HOURS PRN
Qty: 20 TABLET | Refills: 0 | Status: SHIPPED | OUTPATIENT
Start: 2020-06-18 | End: 2020-06-18

## 2020-06-18 RX ORDER — ONDANSETRON 2 MG/ML
4 INJECTION INTRAMUSCULAR; INTRAVENOUS ONCE AS NEEDED
Status: COMPLETED | OUTPATIENT
Start: 2020-06-18 | End: 2020-06-18

## 2020-06-18 RX ORDER — ACETAMINOPHEN 10 MG/ML
1000 INJECTION, SOLUTION INTRAVENOUS ONCE
Status: COMPLETED | OUTPATIENT
Start: 2020-06-18 | End: 2020-06-18

## 2020-06-18 RX ORDER — METOCLOPRAMIDE HYDROCHLORIDE 5 MG/ML
10 INJECTION INTRAMUSCULAR; INTRAVENOUS EVERY 8 HOURS PRN
Status: DISCONTINUED | OUTPATIENT
Start: 2020-06-18 | End: 2020-06-18

## 2020-06-18 RX ORDER — HYDROMORPHONE HYDROCHLORIDE 1 MG/ML
0.5 INJECTION, SOLUTION INTRAMUSCULAR; INTRAVENOUS; SUBCUTANEOUS EVERY 5 MIN PRN
Status: DISCONTINUED | OUTPATIENT
Start: 2020-06-18 | End: 2020-06-18 | Stop reason: HOSPADM

## 2020-06-18 RX ORDER — SODIUM CHLORIDE 9 MG/ML
INJECTION, SOLUTION INTRAVENOUS CONTINUOUS PRN
Status: DISCONTINUED | OUTPATIENT
Start: 2020-06-18 | End: 2020-06-18 | Stop reason: SURG

## 2020-06-18 RX ORDER — CLINDAMYCIN PHOSPHATE 900 MG/50ML
900 INJECTION INTRAVENOUS EVERY 8 HOURS
Status: DISCONTINUED | OUTPATIENT
Start: 2020-06-19 | End: 2020-06-19

## 2020-06-18 RX ORDER — MORPHINE SULFATE 2 MG/ML
1 INJECTION, SOLUTION INTRAMUSCULAR; INTRAVENOUS EVERY 2 HOUR PRN
Status: DISCONTINUED | OUTPATIENT
Start: 2020-06-18 | End: 2020-06-18

## 2020-06-18 RX ORDER — INSULIN ASPART 100 [IU]/ML
INJECTION, SOLUTION INTRAVENOUS; SUBCUTANEOUS ONCE
Status: DISCONTINUED | OUTPATIENT
Start: 2020-06-18 | End: 2020-06-18 | Stop reason: HOSPADM

## 2020-06-18 RX ORDER — MORPHINE SULFATE 4 MG/ML
2 INJECTION, SOLUTION INTRAMUSCULAR; INTRAVENOUS EVERY 10 MIN PRN
Status: DISCONTINUED | OUTPATIENT
Start: 2020-06-18 | End: 2020-06-18 | Stop reason: HOSPADM

## 2020-06-18 RX ORDER — MIDAZOLAM HYDROCHLORIDE 1 MG/ML
INJECTION INTRAMUSCULAR; INTRAVENOUS AS NEEDED
Status: DISCONTINUED | OUTPATIENT
Start: 2020-06-18 | End: 2020-06-18 | Stop reason: SURG

## 2020-06-18 RX ORDER — DIPHENHYDRAMINE HYDROCHLORIDE 50 MG/ML
25 INJECTION INTRAMUSCULAR; INTRAVENOUS ONCE
Status: COMPLETED | OUTPATIENT
Start: 2020-06-18 | End: 2020-06-18

## 2020-06-18 RX ORDER — DEXTROSE MONOHYDRATE 25 G/50ML
50 INJECTION, SOLUTION INTRAVENOUS
Status: DISCONTINUED | OUTPATIENT
Start: 2020-06-18 | End: 2020-06-18 | Stop reason: HOSPADM

## 2020-06-18 RX ORDER — NALOXONE HYDROCHLORIDE 0.4 MG/ML
80 INJECTION, SOLUTION INTRAMUSCULAR; INTRAVENOUS; SUBCUTANEOUS AS NEEDED
Status: DISCONTINUED | OUTPATIENT
Start: 2020-06-18 | End: 2020-06-18 | Stop reason: HOSPADM

## 2020-06-18 RX ORDER — ONDANSETRON 4 MG/1
4 TABLET, FILM COATED ORAL EVERY 8 HOURS PRN
Qty: 20 TABLET | Refills: 1 | Status: SHIPPED | OUTPATIENT
Start: 2020-06-18 | End: 2020-08-05

## 2020-06-18 RX ORDER — DULOXETIN HYDROCHLORIDE 30 MG/1
60 CAPSULE, DELAYED RELEASE ORAL NIGHTLY
Status: DISCONTINUED | OUTPATIENT
Start: 2020-06-18 | End: 2020-06-19

## 2020-06-18 RX ORDER — LIDOCAINE HYDROCHLORIDE 10 MG/ML
INJECTION, SOLUTION EPIDURAL; INFILTRATION; INTRACAUDAL; PERINEURAL AS NEEDED
Status: DISCONTINUED | OUTPATIENT
Start: 2020-06-18 | End: 2020-06-18 | Stop reason: SURG

## 2020-06-18 RX ORDER — ALBUMIN, HUMAN INJ 5% 5 %
SOLUTION INTRAVENOUS CONTINUOUS PRN
Status: DISCONTINUED | OUTPATIENT
Start: 2020-06-18 | End: 2020-06-18 | Stop reason: SURG

## 2020-06-18 RX ORDER — DEXAMETHASONE SODIUM PHOSPHATE 4 MG/ML
VIAL (ML) INJECTION AS NEEDED
Status: DISCONTINUED | OUTPATIENT
Start: 2020-06-18 | End: 2020-06-18 | Stop reason: SURG

## 2020-06-18 RX ORDER — DOCUSATE SODIUM 100 MG/1
100 CAPSULE, LIQUID FILLED ORAL 2 TIMES DAILY PRN
Qty: 20 CAPSULE | Refills: 1 | Status: SHIPPED | OUTPATIENT
Start: 2020-06-18 | End: 2020-08-05

## 2020-06-18 RX ORDER — MORPHINE SULFATE 4 MG/ML
4 INJECTION, SOLUTION INTRAMUSCULAR; INTRAVENOUS EVERY 2 HOUR PRN
Status: DISCONTINUED | OUTPATIENT
Start: 2020-06-18 | End: 2020-06-19

## 2020-06-18 RX ORDER — ROCURONIUM BROMIDE 10 MG/ML
INJECTION, SOLUTION INTRAVENOUS AS NEEDED
Status: DISCONTINUED | OUTPATIENT
Start: 2020-06-18 | End: 2020-06-18 | Stop reason: SURG

## 2020-06-18 RX ORDER — BUPIVACAINE HYDROCHLORIDE 5 MG/ML
INJECTION, SOLUTION EPIDURAL; INTRACAUDAL AS NEEDED
Status: DISCONTINUED | OUTPATIENT
Start: 2020-06-18 | End: 2020-06-18 | Stop reason: HOSPADM

## 2020-06-18 RX ORDER — MORPHINE SULFATE 2 MG/ML
2 INJECTION, SOLUTION INTRAMUSCULAR; INTRAVENOUS EVERY 2 HOUR PRN
Status: DISCONTINUED | OUTPATIENT
Start: 2020-06-18 | End: 2020-06-19

## 2020-06-18 RX ORDER — MORPHINE SULFATE 4 MG/ML
4 INJECTION, SOLUTION INTRAMUSCULAR; INTRAVENOUS EVERY 2 HOUR PRN
Status: DISCONTINUED | OUTPATIENT
Start: 2020-06-18 | End: 2020-06-18

## 2020-06-18 RX ORDER — HYDROMORPHONE HYDROCHLORIDE 1 MG/ML
0.2 INJECTION, SOLUTION INTRAMUSCULAR; INTRAVENOUS; SUBCUTANEOUS EVERY 5 MIN PRN
Status: DISCONTINUED | OUTPATIENT
Start: 2020-06-18 | End: 2020-06-18 | Stop reason: HOSPADM

## 2020-06-18 RX ORDER — METOCLOPRAMIDE 10 MG/1
10 TABLET ORAL EVERY 6 HOURS PRN
Status: DISCONTINUED | OUTPATIENT
Start: 2020-06-18 | End: 2020-06-19

## 2020-06-18 RX ORDER — ACETAMINOPHEN 325 MG/1
650 TABLET ORAL EVERY 6 HOURS PRN
Status: DISCONTINUED | OUTPATIENT
Start: 2020-06-18 | End: 2020-06-19

## 2020-06-18 RX ORDER — MEPERIDINE HYDROCHLORIDE 25 MG/ML
25 INJECTION INTRAMUSCULAR; INTRAVENOUS; SUBCUTANEOUS ONCE
Status: COMPLETED | OUTPATIENT
Start: 2020-06-18 | End: 2020-06-18

## 2020-06-18 RX ORDER — ONDANSETRON 2 MG/ML
4 INJECTION INTRAMUSCULAR; INTRAVENOUS EVERY 6 HOURS PRN
Status: DISCONTINUED | OUTPATIENT
Start: 2020-06-18 | End: 2020-06-19

## 2020-06-18 RX ORDER — GLYCOPYRROLATE 0.2 MG/ML
INJECTION, SOLUTION INTRAMUSCULAR; INTRAVENOUS AS NEEDED
Status: DISCONTINUED | OUTPATIENT
Start: 2020-06-18 | End: 2020-06-18 | Stop reason: SURG

## 2020-06-18 RX ORDER — SODIUM CHLORIDE 9 MG/ML
INJECTION, SOLUTION INTRAVENOUS CONTINUOUS
Status: DISCONTINUED | OUTPATIENT
Start: 2020-06-18 | End: 2020-06-19

## 2020-06-18 RX ORDER — SULFAMETHOXAZOLE AND TRIMETHOPRIM 800; 160 MG/1; MG/1
1 TABLET ORAL 2 TIMES DAILY
Qty: 20 TABLET | Refills: 0 | Status: SHIPPED | OUTPATIENT
Start: 2020-06-18 | End: 2020-06-19

## 2020-06-18 RX ORDER — OXYCODONE HYDROCHLORIDE AND ACETAMINOPHEN 5; 325 MG/1; MG/1
1-2 TABLET ORAL EVERY 6 HOURS PRN
Qty: 40 TABLET | Refills: 0 | Status: SHIPPED | OUTPATIENT
Start: 2020-06-18 | End: 2020-07-06 | Stop reason: ALTCHOICE

## 2020-06-18 RX ORDER — ONDANSETRON 4 MG/1
4 TABLET, FILM COATED ORAL EVERY 8 HOURS PRN
Status: DISCONTINUED | OUTPATIENT
Start: 2020-06-18 | End: 2020-06-19

## 2020-06-18 RX ORDER — OXYCODONE HYDROCHLORIDE AND ACETAMINOPHEN 5; 325 MG/1; MG/1
2 TABLET ORAL EVERY 6 HOURS PRN
Status: DISCONTINUED | OUTPATIENT
Start: 2020-06-18 | End: 2020-06-19

## 2020-06-18 RX ORDER — METOCLOPRAMIDE HYDROCHLORIDE 5 MG/ML
10 INJECTION INTRAMUSCULAR; INTRAVENOUS EVERY 6 HOURS PRN
Status: DISCONTINUED | OUTPATIENT
Start: 2020-06-18 | End: 2020-06-19

## 2020-06-18 RX ORDER — PANTOPRAZOLE SODIUM 40 MG/1
40 TABLET, DELAYED RELEASE ORAL
Status: DISCONTINUED | OUTPATIENT
Start: 2020-06-19 | End: 2020-06-19

## 2020-06-18 RX ORDER — PHENYLEPHRINE HCL 10 MG/ML
VIAL (ML) INJECTION AS NEEDED
Status: DISCONTINUED | OUTPATIENT
Start: 2020-06-18 | End: 2020-06-18 | Stop reason: SURG

## 2020-06-18 RX ORDER — CLINDAMYCIN PHOSPHATE 150 MG/ML
INJECTION, SOLUTION INTRAVENOUS AS NEEDED
Status: DISCONTINUED | OUTPATIENT
Start: 2020-06-18 | End: 2020-06-18 | Stop reason: SURG

## 2020-06-18 RX ADMIN — EPHEDRINE SULFATE 5 MG: 50 INJECTION, SOLUTION INTRAVENOUS at 19:42:00

## 2020-06-18 RX ADMIN — PHENYLEPHRINE HCL 100 MCG: 10 MG/ML VIAL (ML) INJECTION at 18:53:00

## 2020-06-18 RX ADMIN — SODIUM CHLORIDE: 9 INJECTION, SOLUTION INTRAVENOUS at 18:47:00

## 2020-06-18 RX ADMIN — EPHEDRINE SULFATE 5 MG: 50 INJECTION, SOLUTION INTRAVENOUS at 19:33:00

## 2020-06-18 RX ADMIN — PHENYLEPHRINE HCL 100 MCG: 10 MG/ML VIAL (ML) INJECTION at 18:40:00

## 2020-06-18 RX ADMIN — MIDAZOLAM HYDROCHLORIDE 2 MG: 1 INJECTION INTRAMUSCULAR; INTRAVENOUS at 18:34:00

## 2020-06-18 RX ADMIN — SODIUM CHLORIDE: 9 INJECTION, SOLUTION INTRAVENOUS at 19:00:00

## 2020-06-18 RX ADMIN — DEXAMETHASONE SODIUM PHOSPHATE 4 MG: 4 MG/ML VIAL (ML) INJECTION at 19:18:00

## 2020-06-18 RX ADMIN — SODIUM CHLORIDE: 9 INJECTION, SOLUTION INTRAVENOUS at 20:15:00

## 2020-06-18 RX ADMIN — PHENYLEPHRINE HCL 100 MCG: 10 MG/ML VIAL (ML) INJECTION at 19:10:00

## 2020-06-18 RX ADMIN — PHENYLEPHRINE HCL 100 MCG: 10 MG/ML VIAL (ML) INJECTION at 19:27:00

## 2020-06-18 RX ADMIN — CLINDAMYCIN PHOSPHATE 900 MG: 150 INJECTION, SOLUTION INTRAVENOUS at 18:50:00

## 2020-06-18 RX ADMIN — PHENYLEPHRINE HCL 100 MCG: 10 MG/ML VIAL (ML) INJECTION at 19:56:00

## 2020-06-18 RX ADMIN — SODIUM CHLORIDE: 9 INJECTION, SOLUTION INTRAVENOUS at 19:40:00

## 2020-06-18 RX ADMIN — SODIUM CHLORIDE: 9 INJECTION, SOLUTION INTRAVENOUS at 18:58:00

## 2020-06-18 RX ADMIN — PHENYLEPHRINE HCL 100 MCG: 10 MG/ML VIAL (ML) INJECTION at 19:19:00

## 2020-06-18 RX ADMIN — ALBUMIN, HUMAN INJ 5%: 5 SOLUTION INTRAVENOUS at 19:04:00

## 2020-06-18 RX ADMIN — EPHEDRINE SULFATE 10 MG: 50 INJECTION, SOLUTION INTRAVENOUS at 18:48:00

## 2020-06-18 RX ADMIN — SODIUM CHLORIDE: 9 INJECTION, SOLUTION INTRAVENOUS at 19:16:00

## 2020-06-18 RX ADMIN — NEOSTIGMINE METHYLSULFATE 3 MG: 1 INJECTION INTRAVENOUS at 19:49:00

## 2020-06-18 RX ADMIN — LIDOCAINE HYDROCHLORIDE 50 MG: 10 INJECTION, SOLUTION EPIDURAL; INFILTRATION; INTRACAUDAL; PERINEURAL at 18:38:00

## 2020-06-18 RX ADMIN — PHENYLEPHRINE HCL 100 MCG: 10 MG/ML VIAL (ML) INJECTION at 19:15:00

## 2020-06-18 RX ADMIN — ONDANSETRON 4 MG: 2 INJECTION INTRAMUSCULAR; INTRAVENOUS at 19:59:00

## 2020-06-18 RX ADMIN — SODIUM CHLORIDE: 9 INJECTION, SOLUTION INTRAVENOUS at 20:16:00

## 2020-06-18 RX ADMIN — SODIUM CHLORIDE: 9 INJECTION, SOLUTION INTRAVENOUS at 19:39:00

## 2020-06-18 RX ADMIN — SODIUM CHLORIDE: 9 INJECTION, SOLUTION INTRAVENOUS at 19:08:00

## 2020-06-18 RX ADMIN — PHENYLEPHRINE HCL 100 MCG: 10 MG/ML VIAL (ML) INJECTION at 19:46:00

## 2020-06-18 RX ADMIN — PHENYLEPHRINE HCL 200 MCG: 10 MG/ML VIAL (ML) INJECTION at 19:01:00

## 2020-06-18 RX ADMIN — PHENYLEPHRINE HCL 200 MCG: 10 MG/ML VIAL (ML) INJECTION at 18:47:00

## 2020-06-18 RX ADMIN — PHENYLEPHRINE HCL 100 MCG: 10 MG/ML VIAL (ML) INJECTION at 18:51:00

## 2020-06-18 RX ADMIN — SODIUM CHLORIDE: 9 INJECTION, SOLUTION INTRAVENOUS at 19:54:00

## 2020-06-18 RX ADMIN — GLYCOPYRROLATE 0.6 MG: 0.2 INJECTION, SOLUTION INTRAMUSCULAR; INTRAVENOUS at 19:49:00

## 2020-06-18 RX ADMIN — ALBUMIN, HUMAN INJ 5%: 5 SOLUTION INTRAVENOUS at 19:08:00

## 2020-06-18 RX ADMIN — SODIUM CHLORIDE: 9 INJECTION, SOLUTION INTRAVENOUS at 18:57:00

## 2020-06-18 RX ADMIN — PHENYLEPHRINE HCL 100 MCG: 10 MG/ML VIAL (ML) INJECTION at 18:43:00

## 2020-06-18 RX ADMIN — SODIUM CHLORIDE: 9 INJECTION, SOLUTION INTRAVENOUS at 19:55:00

## 2020-06-18 RX ADMIN — PHENYLEPHRINE HCL 100 MCG: 10 MG/ML VIAL (ML) INJECTION at 19:31:00

## 2020-06-18 RX ADMIN — ROCURONIUM BROMIDE 10 MG: 10 INJECTION, SOLUTION INTRAVENOUS at 18:49:00

## 2020-06-18 NOTE — ED PROVIDER NOTES
Patient Seen in: [facility] Emergency Department      History   Patient presents with:  Swelling Edema    Stated Complaint: swelling post-op    HPI    Pt is 48 yo F who arrives by EMS for bleeding from postoperative wound.  Pt is one week s/p jared IMMEDIATE/EXPANDER Bilateral 12/23/2019    Performed by Marce Schaefer MD at [address] [place] [address] MAIN OR   • BREAST SENTINEL LYMPH NODE BIOPSY Right 12/23/2019    Performed by Mala Myrick MD at [address] [place] [address] OR   • CHEMOTHERAPY  1984    Hodgkin;s disease-3B   • COLONOS demetrius        ED Course     Labs Reviewed   BASIC METABOLIC PANEL (8)   CBC WITH DIFFERENTIAL WITH PLATELET   TYPE AND SCREEN   RAINBOW DRAW BLUE   RAINBOW DRAW LAVENDER   RAINBOW DRAW LIGHT GREEN   RAINBOW DRAW GOLD   RAPID SARS-COV-2 BY PCR         MDM

## 2020-06-18 NOTE — ED INITIAL ASSESSMENT (HPI)
Pt arrived per EMS. Pt recently had breast tissue expanders placed. Today began having sudden swelling and bleeding at lt surgery cite. States has not been feeling well the last couple of days. Last food intake - apple at 4pm. Denies injury or trauma.

## 2020-06-18 NOTE — ED NOTES
Pt states her NATANAEL drains on the lt side has filled up twice and blood leaking around the drain.

## 2020-06-18 NOTE — CONSULTS
Estabilshed Patient Consultation    Chief Complaint: L breast expanding hematoma  History of Present Illness:   Maryjane Carty is a 47year old female who underwent repeat TE placement after BL mastectomy.  She had previous infection of the L side BREAST RECONSTRUCTION/ IMMEDIATE/EXPANDER Left 6/11/2020    Performed by Amish Vargas MD at [facility] OR   • BREAST RECONSTRUCTION/ IMMEDIATE/EXPANDER Bilateral 12/23/2019    Performed by Amish Vargas MD at [facility] OR   • BREAST SENTINEL LYMPH NODE B arm)   Pulse 103   Temp 98.2 °F (36.8 °C) (Oral)   Resp 26   Ht 1.702 m (5' 7\")   Wt 59.9 kg (132 lb)   SpO2 100%   BMI 20.67 kg/m²     Constitutional: The patient is an alert, oriented and well-developed.      Neurologic: Speech patterns and movements are

## 2020-06-18 NOTE — ANESTHESIA PREPROCEDURE EVALUATION
Anesthesia PreOp Note    HPI:     Oswaldo Tillman is a 47year old female who presents for preoperative consultation requested by: Zen Bazan MD    Date of Surgery: 6/18/2020    Procedure(s):  BREAST LUMPECTOMY  Indication: Breast abscess [N6 Controlled type 2 diabetes mellitus without complication, without long-term current use of insulin ([facility] [address]         Date Noted: 03/06/2015      Myopia         Date Noted: 03/06/2015        Past Medical History:   Diagnosis Date   • Asplenia after surgical p Performed by Yecenia Steele MD at [facility] ENDOSCOPY   • ESOPHAGOGASTRODUODENOSCOPY (EGD) N/A 12/27/2018    Performed by Yecenia Steele MD at [place] [address]  2013    hip pinning for stress fracture,right   • LUMPECTOMY RIGHT  2019 omeprazole 20 MG Oral Capsule Delayed Release, Take 20 mg by mouth 2 (two) times daily before meals.   , Disp: , Rfl:   CARTIA  MG Oral Capsule SR 24 Hr, TAKE TWO CAPSULES BY MOUTH IN THE MORNING  (Patient taking differently: 360 mg daily.  ), Disp: 6 Active member of club or organization: Not on file        Attends meetings of clubs or organizations: Not on file        Relationship status: Not on file      Intimate partner violence:        Fear of current or ex partner: Not on file        Emotion height is 1.702 m (5' 7\") and weight is 59.9 kg (132 lb). Her oral temperature is 98.2 °F (36.8 °C). Her blood pressure is 135/105 (abnormal) and her pulse is 115. Her respiration is 32 (abnormal) and oxygen saturation is 100%.     06/18/20  1751   BP: Edi Hazelon

## 2020-06-19 ENCOUNTER — DOCUMENTATION ONLY (OUTPATIENT)
Dept: SURGERY | Facility: CLINIC | Age: 55
End: 2020-06-19

## 2020-06-19 VITALS
TEMPERATURE: 99 F | RESPIRATION RATE: 20 BRPM | WEIGHT: 132 LBS | HEIGHT: 67 IN | DIASTOLIC BLOOD PRESSURE: 96 MMHG | BODY MASS INDEX: 20.72 KG/M2 | SYSTOLIC BLOOD PRESSURE: 145 MMHG | OXYGEN SATURATION: 100 % | HEART RATE: 100 BPM

## 2020-06-19 PROCEDURE — 99239 HOSP IP/OBS DSCHRG MGMT >30: CPT | Performed by: HOSPITALIST

## 2020-06-19 RX ORDER — SULFAMETHOXAZOLE AND TRIMETHOPRIM 800; 160 MG/1; MG/1
1 TABLET ORAL 2 TIMES DAILY
Qty: 20 TABLET | Refills: 0 | Status: SHIPPED | OUTPATIENT
Start: 2020-06-19 | End: 2020-06-29

## 2020-06-19 RX ORDER — DIPHENHYDRAMINE HCL 25 MG
25 CAPSULE ORAL EVERY 6 HOURS PRN
Status: DISCONTINUED | OUTPATIENT
Start: 2020-06-19 | End: 2020-06-19

## 2020-06-19 NOTE — ANESTHESIA POSTPROCEDURE EVALUATION
Patient: Francie Landerskarmichael    Procedure Summary     Date:  06/18/20 Room / Location:  [facility] OR 02 / [facility] OR    Anesthesia Start:  7412 Anesthesia Stop:  2030    Procedure:    Evacuation of hematoma left breast and removal and reinsertion of l

## 2020-06-19 NOTE — PATIENT INSTRUCTIONS
Surgeon:              Dr. Vandana Durán.  Rick Davis, PhD                                          Tel:         [phone]                                  Fax:        [phone]    Surgery/Procedure:       Bilateral tissue expander removal and permanent implant pl

## 2020-06-19 NOTE — ANESTHESIA PROCEDURE NOTES
Peripheral IV  Date/Time: 6/18/2020 8:45 AM  Inserted by: Ant eNil MD    Placement  Needle size: 20 G  Laterality: right  Location: wrist  Site prep: alcohol  Technique: anatomical landmarks  Attempts: 1

## 2020-06-19 NOTE — ANESTHESIA PROCEDURE NOTES
Airway  Date/Time: 6/18/2020 8:39 AM  Urgency: elective    Airway not difficult    General Information and Staff    Patient location during procedure: OR  Anesthesiologist: Melissa David MD  Performed: anesthesiologist     Indications and Patient Condi

## 2020-06-19 NOTE — PATIENT INSTRUCTIONS
Surgeon:              Dr. Elvira Rothman, PhD                                          Tel:         [phone]                                  Fax:        [phone]    Surgery/Procedure:       Bilateral tissue expander removal and permanent implant pl

## 2020-06-19 NOTE — BRIEF OP NOTE
Pre-Operative Diagnosis: left breast hematoma     Post-Operative Diagnosis: left breast hematoma      Procedure Performed:   Procedure(s):  Evacuation of hematoma left breast and removal and reinsertion of left breast tissue expander    Surgeon(s) and Role

## 2020-06-19 NOTE — PROGRESS NOTES
Plastic Surgery Progress Note    Nadia Garces is a 47year old female POD#1 s/p evacuation of hematoma left breast and removal and reinsertion of left breast tissue expander. Patient denies any acute events or problems overnight.  Pain well con

## 2020-06-19 NOTE — PAYOR COMM NOTE
--------------  ADMISSION REVIEW     Payor: [facility]  Subscriber #:  [id number]  Authorization Number: [id number]    Admit date: 6/18/20  Admit time: 2221       Admitting Physician: Amy Jang MD  Attending Physician:  Austen Champion MD  Primary vomiting)    • Serratia 2005   • Stress fracture of hip 2013    hip pinning   • Type 1 diabetes mellitus (HCC)     Type 1-C   • Visual impairment     WEARS GLASSES/CONTACT           Physical Exam     ED Triage Vitals [06/18/20 1751]   BP (!) 135/105   Puls Discharge Medication List                                    Signed by Jamee Enriquez MD on 6/18/2020  6:31 PM            H&P - H&P Note      H&P signed by Natalie Shoemaker MD at 6/18/2020  7:34 PM      Author:  Natalie Shoemaker MD Service:  [facility]. status post hip pinning. MEDICATIONS:  Please see medication reconciliation list.    ALLERGIES:  Iodinated agents, contrast, and penicillin causes mouth sores. FAMILY HISTORY:  Mother had glaucoma and hypertension.   Father had coronary artery disea 18:18:25  t: 06/18/2020 19:00:02  Job [id number]  SHELL/    Electronically signed by Mikey Hwang MD on 6/18/2020  7:34 PM         MEDICATIONS ADMINISTERED IN LAST 1 DAY:  acetaminophen (OFIRMEV) infusion 1,000 mg     Date Action Dose Route Use 6/18/2020 2016 Given 50 mcg Intravenous Darshan Sampson MD    6/18/2020 2014 Given 50 mcg Intravenous Darshan Sampson MD    6/18/2020 2011 Given 50 mcg Intravenous Darshan Sampson MD    6/18/2020 1838 Given 50 mcg Intravenous Darshan Sampson MD 4 mg     Date Action Dose Route User    6/18/2020 2033 Given 4 mg Intravenous Camille Barton RN      ondansetron HCl [facility]) injection     Date Action Dose Route User    6/18/2020 1959 Given 4 mg Intravenous Jamey Fleming MD      oxyCODONE-acetaminophe User    6/18/2020 2050 New [address] (none) Intravenous Bianca Colon RN    6/18/2020 2016 New Bag (none) Intravenous Dustin Sullivan MD    6/18/2020 1858 New Bag (none) Intravenous Dustin Sullivan MD    6/18/2020 1833 Continued by Anesthesia (none) Lore Alonzo and was immediately brought to the operating room for evacuation of the hematoma and stopping the bleeding as well as irrigation of the wound and expander removal and replacement as needed.   Potential risks, complications, benefits, and alternatives were d expander was removed and placed in antibiotic solution. After the bleeding was stopped, the expander was thoroughly rinsed, inspected, and tested for integrity. It was intact. Therefore, the expander was placed again and secured at the inferior tabs.   I incentive spirometry and deep breathing  4. Reviewed discharge instructions, drain management, bathing instructions, and activity restrictions    5. Compression at all times except for hygiene purposes for 6 weeks.    6. Percocet, Zofran, Colace rx in chart

## 2020-06-19 NOTE — H&P
[facility]    PATIENT'S NAME: Gume Morales, [facility]   ATTENDING PHYSICIAN: Tejas Piedra MD   PATIENT ACCOUNT#:   [id number]    LOCATION:  [place] [address]  MEDICAL RECORD #:   [id number]       YOB: 1965  ADMIS activities of daily living. REVIEW OF SYSTEMS:  Patient reports no recent trauma, but she had rapid progressive tightness and expansion of the left breast with pain followed by bleeding from around the NATANAEL drain. Denies any recent trauma again.   Other 1

## 2020-06-19 NOTE — OPERATIVE REPORT
[facility]    PATIENT'S NAME: Yemi Mendoza, [facility]   ATTENDING PHYSICIAN: Ray Black MD   OPERATING PHYSICIAN: Florence Redd MD   PATIENT ACCOUNT#:   [id number]    LOCATION:  [address] [place] [address] #:   [id number]       DATE OF BIRTH: capsular contracture, need for further surgery. The patient understands and wishes to proceed. Questions were answered. OPERATIVE TECHNIQUE:  The patient was identified in the ER and then brought up to the preoperative area.   Informed consent was obta followed by 3-0 Vicryl sutures for the deep dermis and 4-0 Monocryl subcuticular sutures for the skin. Dermabond and Steri-Strips were applied. Fluffs and a bra were applied.   The patient tolerated the procedure well and awoke from anesthesia without dif

## 2020-06-19 NOTE — DISCHARGE SUMMARY
[facility]    Discharge Summary    [address] [place] Patient Status:  Inpatient    1965 MRN [id number]   Location [facility] 4W/SW/SE Attending Flaquito Giron MD   Hosp Day # 1 PCP Ana Mcclelland MD     Date of Admi Psychiatric: calm        History of Present Illness:   Per Dr. Jemma Giordano   Patient is a 55-year-old  female with known history of breast cancer status post bilateral breast expanders after mastectomy.   She presented to the emergency department toda Take 1 tablet (4 mg total) by mouth every 8 (eight) hours as needed for Nausea. What changed:  Another medication with the same name was added. Make sure you understand how and when to take each.      * Ondansetron HCl 4 mg tablet  Commonly known as:  Zofr * Fiasp FlexTouch 100 UNIT/ML Sopn  Generic drug:  Insulin Aspart (w/Niacinamide)     * Fiasp 100 UNIT/ML Soln  Generic drug:  Insulin Aspart (w/Niacinamide)     insulin glargine 100 UNIT/ML Soln  Commonly known as:  LANTUS     letrozole 2.5 MG Tabs  Commo

## 2020-06-19 NOTE — PLAN OF CARE
Problem: Patient Centered Care  Goal: Patient preferences are identified and integrated in the patient's plan of care  Description  Interventions:  - What would you like us to know as we care for you?  Have a support system  - Provide timely, complete, an evaluate response  - Consider cultural and social influences on pain and pain management  - Manage/alleviate anxiety  - Utilize distraction and/or relaxation techniques  - Monitor for opioid side effects  - Notify MD/LIP if interventions unsuccessful or pa discharge planning if the patient needs post-hospital services based on physician/LIP order or complex needs related to functional status, cognitive ability or social support system  Outcome: Progressing     Pt is A&O x4. Received report from Donal from [place]

## 2020-06-22 ENCOUNTER — TELEPHONE (OUTPATIENT)
Dept: SURGERY | Facility: CLINIC | Age: 55
End: 2020-06-22

## 2020-06-23 NOTE — PAYOR COMM NOTE
--------------  DISCHARGE REVIEW    Payor: [employer]  Subscriber #:  [id number]  Authorization Number: [id number]    Admit date: 6/18/20  Admit time:  2221  Discharge Date: 6/19/2020 11:59 AM     Admitting Physician: Micha Gomez MD  Attending Physici Insulin dependent type 2 diabetes mellitus (HCC)     Absence of breast, bilateral     Absence of both breasts     Breast abscess     Dehydration     Anorexia     COVID-19     Bleeding from wound     Breast hematoma      Reason for Admission:   Shannan Pineda * docusate sodium 100 MG Caps  Commonly known as:  COLACE  Take 1 capsule (100 mg total) by mouth 2 (two) times daily as needed for constipation. What changed:  Another medication with the same name was added.  Make sure you understand how and when to take What changed:  Another medication with the same name was added. Make sure you understand how and when to take each. * Sulfamethoxazole-TMP -160 MG Tabs per tablet  Commonly known as:   Bactrim DS  Take 1 tablet by mouth 2 (two) times daily for 10 Follow up Visits:  Follow-up with pcp as needed    Follow up Labs: n/a     Other Discharge Instructions: f/u with Dr. Britt Truong MD  6/19/2020  11:22 AM

## 2020-06-26 ENCOUNTER — OFFICE VISIT (OUTPATIENT)
Dept: SURGERY | Facility: CLINIC | Age: 55
End: 2020-06-26
Payer: COMMERCIAL

## 2020-06-26 DIAGNOSIS — Z90.13 ABSENCE OF BREAST, BILATERAL: Primary | ICD-10-CM

## 2020-06-26 PROCEDURE — 99024 POSTOP FOLLOW-UP VISIT: CPT | Performed by: SURGERY

## 2020-06-26 PROCEDURE — 1111F DSCHRG MED/CURRENT MED MERGE: CPT | Performed by: SURGERY

## 2020-06-26 NOTE — PROGRESS NOTES
Rosalind Kuhn is a 47year old female who presents today for a follow-up s/p SSM and SLNBx Dr. Abdon Peterson immediate reconstruction with bilateral TE, ADM, prepectoral position, 180 cc Intra-Op air fill and wise skin excision on 12/23/2019.  She u was left in today and she will call when her drain output is less than 20 cc per day for 2 days. She will continue her antibiotic while her drain is in place. We reviewed activity restrictions, need for compression and reasons to contact our office.      Solomon Brown

## 2020-06-30 ENCOUNTER — TELEPHONE (OUTPATIENT)
Dept: SURGERY | Facility: CLINIC | Age: 55
End: 2020-06-30

## 2020-06-30 NOTE — TELEPHONE ENCOUNTER
Patient called today stating that her drain output is low. The last 2 days it has been at 22cc. I told the patient that Dr. Kyra Garcia like to have the drain output under 20. Patient was instructed to call us back Thursday.  If the drains are ready at that time

## 2020-07-01 ENCOUNTER — TELEPHONE (OUTPATIENT)
Dept: GASTROENTEROLOGY | Facility: CLINIC | Age: 55
End: 2020-07-01

## 2020-07-01 ENCOUNTER — TELEPHONE (OUTPATIENT)
Dept: SURGERY | Facility: CLINIC | Age: 55
End: 2020-07-01

## 2020-07-01 NOTE — TELEPHONE ENCOUNTER
Prior authorization received from [employer] for stomach emptying study effective 12/03/19 through 12/21/2020  [id number]  Referral already updated. Forms sent to scan.

## 2020-07-06 ENCOUNTER — OFFICE VISIT (OUTPATIENT)
Dept: SURGERY | Facility: CLINIC | Age: 55
End: 2020-07-06
Payer: COMMERCIAL

## 2020-07-06 ENCOUNTER — TELEPHONE (OUTPATIENT)
Dept: SURGERY | Facility: CLINIC | Age: 55
End: 2020-07-06

## 2020-07-06 DIAGNOSIS — N64.89 BREAST HEMATOMA: ICD-10-CM

## 2020-07-06 DIAGNOSIS — Z90.13 ABSENCE OF BREAST, BILATERAL: Primary | ICD-10-CM

## 2020-07-06 PROCEDURE — 99024 POSTOP FOLLOW-UP VISIT: CPT | Performed by: PHYSICIAN ASSISTANT

## 2020-07-06 RX ORDER — HYDROCODONE BITARTRATE AND ACETAMINOPHEN 5; 325 MG/1; MG/1
1-2 TABLET ORAL EVERY 6 HOURS PRN
Qty: 20 TABLET | Refills: 0 | Status: SHIPPED | OUTPATIENT
Start: 2020-07-06 | End: 2020-08-05

## 2020-07-06 NOTE — PROGRESS NOTES
Bipin Downey is a 47year old female who presents today for a follow-up s/p SSM and SLNBx Dr. Rasheed Mitchell immediate reconstruction with bilateral TE, ADM, prepectoral position, 180 cc Intra-Op air fill and wise skin excision on 12/23/2019.  She u breast tissue expander on 6/18/2020    Due to low drain output, her left breast drain was removed. Neosporin and gauze were placed. She tolerated this well. New steri strips were placed today. She will continue compression and activity modification.  We rev

## 2020-07-06 NOTE — TELEPHONE ENCOUNTER
I returned the patient's call and scheduled her procedure at [facility] w/Dr Jeremias San on 08-.   She has questions that she advised she will ask Meli Raymond later today at her appt

## 2020-07-08 ENCOUNTER — OFFICE VISIT (OUTPATIENT)
Dept: SURGERY | Facility: CLINIC | Age: 55
End: 2020-07-08
Payer: COMMERCIAL

## 2020-07-08 DIAGNOSIS — Z90.13 ABSENCE OF BOTH BREASTS: Primary | ICD-10-CM

## 2020-07-08 PROCEDURE — 99024 POSTOP FOLLOW-UP VISIT: CPT | Performed by: SURGERY

## 2020-07-08 NOTE — PROGRESS NOTES
Carolyn Navarro is a 47year old female who presents today for a follow-up s/p SSM and SLNBx Dr. Enrique Baca immediate reconstruction with bilateral TE, ADM, prepectoral position, 180 cc Intra-Op air fill and wise skin excision on 12/23/2019. She u She remains at 300 cc of saline on the right. She is happy with the size of her right expander. She will follow up in 1 week for continued left tissue expander expansion and preop. She is currently scheduled for surgery on August 6.  We reviewed second [facility]

## 2020-07-13 ENCOUNTER — TELEPHONE (OUTPATIENT)
Dept: HEMATOLOGY/ONCOLOGY | Facility: HOSPITAL | Age: 55
End: 2020-07-13

## 2020-07-13 NOTE — TELEPHONE ENCOUNTER
Re: Follow up Visit for 7/14. Per Solomon Leonard \" Please let Dr. Zhang Bolus know I have had COVID and other issues. I am having surgery in August and will follow up in September. \"

## 2020-07-14 ENCOUNTER — APPOINTMENT (OUTPATIENT)
Dept: HEMATOLOGY/ONCOLOGY | Facility: HOSPITAL | Age: 55
End: 2020-07-14
Attending: INTERNAL MEDICINE
Payer: COMMERCIAL

## 2020-07-14 ENCOUNTER — TELEPHONE (OUTPATIENT)
Dept: SURGERY | Facility: CLINIC | Age: 55
End: 2020-07-14

## 2020-07-14 ENCOUNTER — TELEPHONE (OUTPATIENT)
Dept: GASTROENTEROLOGY | Facility: CLINIC | Age: 55
End: 2020-07-14

## 2020-07-14 DIAGNOSIS — R19.7 DIARRHEA, UNSPECIFIED TYPE: Primary | ICD-10-CM

## 2020-07-14 NOTE — TELEPHONE ENCOUNTER
I spoke to the patient's , Benja Butcher. Tahir Poon has had recent diarrhea and bleeding. Recent antibiotics and Covid infection Last colonoscopy was in 10/19. I am recommending a C difficile toxin assay as a first step. Order placed.

## 2020-07-15 ENCOUNTER — APPOINTMENT (OUTPATIENT)
Dept: LAB | Facility: HOSPITAL | Age: 55
End: 2020-07-15
Attending: INTERNAL MEDICINE
Payer: COMMERCIAL

## 2020-07-15 DIAGNOSIS — R19.7 DIARRHEA, UNSPECIFIED TYPE: ICD-10-CM

## 2020-07-15 PROCEDURE — 87493 C DIFF AMPLIFIED PROBE: CPT

## 2020-07-16 LAB — C DIFF TOX B STL QL: NEGATIVE

## 2020-07-17 ENCOUNTER — TELEPHONE (OUTPATIENT)
Dept: GASTROENTEROLOGY | Facility: CLINIC | Age: 55
End: 2020-07-17

## 2020-07-17 ENCOUNTER — OFFICE VISIT (OUTPATIENT)
Dept: SURGERY | Facility: CLINIC | Age: 55
End: 2020-07-17
Payer: COMMERCIAL

## 2020-07-17 DIAGNOSIS — Z90.13 ABSENCE OF BOTH BREASTS: Primary | ICD-10-CM

## 2020-07-17 PROCEDURE — 99024 POSTOP FOLLOW-UP VISIT: CPT | Performed by: SURGERY

## 2020-07-17 RX ORDER — POLYETHYLENE GLYCOL 3350, SODIUM CHLORIDE, SODIUM BICARBONATE, POTASSIUM CHLORIDE 420; 11.2; 5.72; 1.48 G/4L; G/4L; G/4L; G/4L
4 POWDER, FOR SOLUTION ORAL ONCE
Qty: 4000 ML | Refills: 0 | Status: SHIPPED | OUTPATIENT
Start: 2020-07-17 | End: 2020-07-17

## 2020-07-17 NOTE — TELEPHONE ENCOUNTER
Scheduled for:  Colonoscopy 01856  Provider Name:  Dr. Latia Johnston  Date:  7/18/20  Location:    [facility]  Sedation:  MAC  Time:  1456 (pt is aware to arrive at 1115)   Prep:  Kailey Chavez, sent via [address] [place] [address] [address]  Meds/Allergies Reconciled?:  Physician reviewed   Diagnos

## 2020-07-17 NOTE — TELEPHONE ENCOUNTER
Lolita Metzger called she will double check with [place] for prep Rx. She had no questions regarding her prep/diet instructions.  She will call us with any further difficulties

## 2020-07-17 NOTE — H&P (VIEW-ONLY)
Estabilshed Patient Consultation    Chief Complaint absence of both breasts    History of Present Illness:   Cyrilla Boast is a 47year old female who returns to the office for fill of her expander.  She is 300 cc on the right and 180cc on the le Performed by Sherill Peabody, MD at [facility] MAIN OR   • CHEMOTHERAPY  1984    Hodgkin;s disease-3B   • COLONOSCOPY  3/21/2014   • COLONOSCOPY N/A 10/19/2019    Performed by Serg Vargas MD at [facility] ENDOSCOPY   • ESOPHAGOGASTRODUODENOSCOPY (EGD) N/A 10 appropriate. Eyes: Conjunctiva are clear, non-icteric. PERRL    ENT: no obvious abnormality, no ear drainage, mucous membranes moist and pink    Integument/Skin: The skin appears normal. There are no suspicious appearing rashes or lesions.     Breasts: n

## 2020-07-17 NOTE — CONSULTS
Estabilshed Patient Consultation    Chief Complaint absence of both breasts    History of Present Illness:   Nadia Garces is a 47year old female who returns to the office for fill of her expander.  She is 300 cc on the right and 180cc on the le Performed by Piero Angeles MD at [address] [place] [address] MAIN OR   • CHEMOTHERAPY  1984    Hodgkin;s disease-3B   • COLONOSCOPY  3/21/2014   • COLONOSCOPY N/A 10/19/2019    Performed by Maxi Davis MD at [address] [place] [address] ENDOSCOPY   • ESOPHAGOGASTRODUODENOSCOPY (EGD) N/A 10 appropriate. Eyes: Conjunctiva are clear, non-icteric. PERRL    ENT: no obvious abnormality, no ear drainage, mucous membranes moist and pink    Integument/Skin: The skin appears normal. There are no suspicious appearing rashes or lesions.     Breasts: n

## 2020-07-17 NOTE — PAT NURSING NOTE
Pt.requires a rapid covid test day of procedure because she was a late add on case day prior and it was too late for pt.to go to [place] to get Covid test.

## 2020-07-17 NOTE — TELEPHONE ENCOUNTER
Tahir Poon has elected to proceed with a colonoscopy for a history of polyps and diarrhea which will be arranged tomorrow following a split dose Trilyte prep and MAC. Rx sent.

## 2020-07-18 ENCOUNTER — ANESTHESIA (OUTPATIENT)
Dept: ENDOSCOPY | Facility: HOSPITAL | Age: 55
End: 2020-07-18
Payer: COMMERCIAL

## 2020-07-18 ENCOUNTER — HOSPITAL ENCOUNTER (OUTPATIENT)
Facility: HOSPITAL | Age: 55
Setting detail: HOSPITAL OUTPATIENT SURGERY
Discharge: HOME | End: 2020-07-18
Attending: INTERNAL MEDICINE | Admitting: INTERNAL MEDICINE
Payer: COMMERCIAL

## 2020-07-18 ENCOUNTER — ANESTHESIA EVENT (OUTPATIENT)
Dept: ENDOSCOPY | Facility: HOSPITAL | Age: 55
End: 2020-07-18
Payer: COMMERCIAL

## 2020-07-18 VITALS
OXYGEN SATURATION: 99 % | TEMPERATURE: 98 F | RESPIRATION RATE: 16 BRPM | SYSTOLIC BLOOD PRESSURE: 137 MMHG | BODY MASS INDEX: 20.4 KG/M2 | DIASTOLIC BLOOD PRESSURE: 94 MMHG | HEART RATE: 88 BPM | HEIGHT: 67 IN | WEIGHT: 130 LBS

## 2020-07-18 DIAGNOSIS — Z86.010 HX OF COLONIC POLYPS: ICD-10-CM

## 2020-07-18 DIAGNOSIS — R19.7 DIARRHEA: ICD-10-CM

## 2020-07-18 LAB
ADENOVIRUS F 40/41 PCR: NEGATIVE
ASTROVIRUS PCR: NEGATIVE
C CAYETANENSIS DNA SPEC QL NAA+PROBE: NEGATIVE
CAMPY SP DNA.DIARRHEA STL QL NAA+PROBE: NEGATIVE
CRYPTOSP DNA SPEC QL NAA+PROBE: NEGATIVE
EAEC PAA PLAS AGGR+AATA ST NAA+NON-PRB: NEGATIVE
EC STX1+STX2 + H7 FLIC SPEC NAA+PROBE: NEGATIVE
ENTAMOEBA HISTOLYTICA PCR: NEGATIVE
EPEC EAE GENE STL QL NAA+NON-PROBE: NEGATIVE
ETEC LTA+ST1A+ST1B TOX ST NAA+NON-PROBE: NEGATIVE
GIARDIA LAMBLIA PCR: NEGATIVE
GLUCOSE BLDC GLUCOMTR-MCNC: 79 MG/DL (ref 70–99)
NOROVIRUS GI/GII PCR: NEGATIVE
P SHIGELLOIDES DNA STL QL NAA+PROBE: NEGATIVE
ROTAVIRUS A PCR: NEGATIVE
SALMONELLA DNA SPEC QL NAA+PROBE: NEGATIVE
SAPOVIRUS PCR: NEGATIVE
SARS-COV-2 RNA RESP QL NAA+PROBE: NOT DETECTED
SHIGELLA SP+EIEC IPAH ST NAA+NON-PROBE: NEGATIVE
V CHOLERAE DNA SPEC QL NAA+PROBE: NEGATIVE
VIBRIO DNA SPEC NAA+PROBE: NEGATIVE
YERSINIA DNA SPEC NAA+PROBE: NEGATIVE

## 2020-07-18 PROCEDURE — 0DBK8ZX EXCISION OF ASCENDING COLON, VIA NATURAL OR ARTIFICIAL OPENING ENDOSCOPIC, DIAGNOSTIC: ICD-10-PCS | Performed by: INTERNAL MEDICINE

## 2020-07-18 PROCEDURE — 87507 IADNA-DNA/RNA PROBE TQ 12-25: CPT | Performed by: INTERNAL MEDICINE

## 2020-07-18 PROCEDURE — 82962 GLUCOSE BLOOD TEST: CPT

## 2020-07-18 PROCEDURE — 0DBE8ZX EXCISION OF LARGE INTESTINE, VIA NATURAL OR ARTIFICIAL OPENING ENDOSCOPIC, DIAGNOSTIC: ICD-10-PCS | Performed by: INTERNAL MEDICINE

## 2020-07-18 PROCEDURE — 0DBN8ZX EXCISION OF SIGMOID COLON, VIA NATURAL OR ARTIFICIAL OPENING ENDOSCOPIC, DIAGNOSTIC: ICD-10-PCS | Performed by: INTERNAL MEDICINE

## 2020-07-18 PROCEDURE — 0DBH8ZX EXCISION OF CECUM, VIA NATURAL OR ARTIFICIAL OPENING ENDOSCOPIC, DIAGNOSTIC: ICD-10-PCS | Performed by: INTERNAL MEDICINE

## 2020-07-18 PROCEDURE — 88305 TISSUE EXAM BY PATHOLOGIST: CPT | Performed by: INTERNAL MEDICINE

## 2020-07-18 RX ORDER — NALOXONE HYDROCHLORIDE 0.4 MG/ML
80 INJECTION, SOLUTION INTRAMUSCULAR; INTRAVENOUS; SUBCUTANEOUS AS NEEDED
Status: CANCELLED | OUTPATIENT
Start: 2020-07-18 | End: 2020-07-18

## 2020-07-18 RX ORDER — ONDANSETRON 2 MG/ML
INJECTION INTRAMUSCULAR; INTRAVENOUS AS NEEDED
Status: DISCONTINUED | OUTPATIENT
Start: 2020-07-18 | End: 2020-07-18 | Stop reason: SURG

## 2020-07-18 RX ORDER — LIDOCAINE HYDROCHLORIDE 10 MG/ML
INJECTION, SOLUTION EPIDURAL; INFILTRATION; INTRACAUDAL; PERINEURAL AS NEEDED
Status: DISCONTINUED | OUTPATIENT
Start: 2020-07-18 | End: 2020-07-18 | Stop reason: SURG

## 2020-07-18 RX ORDER — SODIUM CHLORIDE, SODIUM LACTATE, POTASSIUM CHLORIDE, CALCIUM CHLORIDE 600; 310; 30; 20 MG/100ML; MG/100ML; MG/100ML; MG/100ML
INJECTION, SOLUTION INTRAVENOUS CONTINUOUS
Status: CANCELLED | OUTPATIENT
Start: 2020-07-18

## 2020-07-18 RX ORDER — DEXTROSE MONOHYDRATE 25 G/50ML
50 INJECTION, SOLUTION INTRAVENOUS
Status: CANCELLED | OUTPATIENT
Start: 2020-07-18

## 2020-07-18 RX ORDER — SODIUM CHLORIDE, SODIUM LACTATE, POTASSIUM CHLORIDE, CALCIUM CHLORIDE 600; 310; 30; 20 MG/100ML; MG/100ML; MG/100ML; MG/100ML
INJECTION, SOLUTION INTRAVENOUS CONTINUOUS
Status: DISCONTINUED | OUTPATIENT
Start: 2020-07-18 | End: 2020-07-18

## 2020-07-18 RX ADMIN — SODIUM CHLORIDE, SODIUM LACTATE, POTASSIUM CHLORIDE, CALCIUM CHLORIDE: 600; 310; 30; 20 INJECTION, SOLUTION INTRAVENOUS at 12:49:00

## 2020-07-18 RX ADMIN — ONDANSETRON 4 MG: 2 INJECTION INTRAMUSCULAR; INTRAVENOUS at 13:33:00

## 2020-07-18 RX ADMIN — SODIUM CHLORIDE, SODIUM LACTATE, POTASSIUM CHLORIDE, CALCIUM CHLORIDE: 600; 310; 30; 20 INJECTION, SOLUTION INTRAVENOUS at 13:43:00

## 2020-07-18 RX ADMIN — LIDOCAINE HYDROCHLORIDE 50 MG: 10 INJECTION, SOLUTION EPIDURAL; INFILTRATION; INTRACAUDAL; PERINEURAL at 12:54:00

## 2020-07-18 NOTE — H&P
History & Physical Examination    Patient Name: Steffany Juarezelor  MRN: [id number]  [facility]: [id number]  YOB: 1965    Diagnosis: Personal history of adenomatous colon polyps, persistent diarrhea      letrozole 2.5 MG Oral Tab, Take 1 table Rfl: 1, 6/17/2020 at 2100  DULoxetine HCl 60 MG Oral Cap DR Particles, Take 60 mg by mouth nightly., Disp: , Rfl: , 6/17/2020 at 2100      lactated ringers infusion, , Intravenous, Continuous        Allergies:   Iodinated Diagnosti*    HIVES    Comment:Oth at [facility] MAIN OR   • CHEMOTHERAPY  1984    Hodgkin;s disease-3B   • COLONOSCOPY  3/21/2014   • COLONOSCOPY N/A 10/19/2019    Performed by Coco Blue MD at [facility] ENDOSCOPY   • ESOPHAGOGASTRODUODENOSCOPY (EGD) N/A 10/19/2019    Performed by Dustin Rodriguez

## 2020-07-18 NOTE — ANESTHESIA PREPROCEDURE EVALUATION
Anesthesia PreOp Note    HPI:     Jasson Tejada is a 47year old female who presents for preoperative consultation requested by: Sampson Gomez MD    Date of Surgery: 7/18/2020    Procedure(s):  COLONOSCOPY  Indication: DIARRHEA, HX COLON Noted: 12/26/2018      Viral warts         Date Noted: 06/09/2016      Controlled type 2 diabetes mellitus without complication, without long-term current use of insulin [facility])         Date Noted: 03/06/2015      Myopia         Date Noted: 03/06/2015 Performed by Bethany Acosta MD at [address] [place] [address] ENDOSCOPY   • ESOPHAGOGASTRODUODENOSCOPY (EGD) N/A 10/19/2019    Performed by Bethany Acosta MD at [address] [place] [address] ENDOSCOPY   • ESOPHAGOGASTRODUODENOSCOPY (EGD) N/A 12/27/2018    Performed by Bethany Acosta, [facility] mg tablet, Take 1 tablet (4 mg total) by mouth every 8 (eight) hours as needed for Nausea., Disp: 20 tablet, Rfl: 1  docusate sodium 100 MG Oral Cap, Take 1 capsule (100 mg total) by mouth 2 (two) times daily as needed for constipation. , Disp: 20 capsule, per week: Not on file        Minutes per session: Not on file      Stress: Not on file    Relationships      Social connections:        Talks on phone: Not on file        Gets together: Not on file        Attends Gnosticism service: Not on file        Activ Vital Signs: Body mass index is 20.36 kg/m². height is 1.702 m (5' 7\") and weight is 59 kg (130 lb). Her tympanic temperature is 98.8 °F (37.1 °C). Her blood pressure is 137/90 and her pulse is 85.  Her respiration is 16 and oxygen saturation is 100

## 2020-07-18 NOTE — OPERATIVE REPORT
[facility] Endoscopy Report      Date of Procedure:  07/18/20      Preoperative Diagnosis:  1. Personal history of adenomatous colon polyps  2. Persistent diarrhea  3. Transient rectal bleeding      Postoperative Diagnosis:  1.   Colon po serrated polyp which was cold snare excised and retrieved. 2.  In the distal ascending colon there was a 1 cm serrated sessile polyp which was cold snare excised and retrieved. Due to persistent bleeding this site was closed with #4 endoscopic clips.   3.

## 2020-07-18 NOTE — ANESTHESIA POSTPROCEDURE EVALUATION
Patient: Padmini Mis    Procedure Summary     Date:  07/18/20 Room / Location:  [address] [place] [address] ENDOSCOPY 05 / [address] [place] [address] ENDOSCOPY    Anesthesia Start:  1458 Anesthesia Stop:      Procedure:  COLONOSCOPY (N/A ) Diagnosis:       Diarrhea      Hx of colonic polyp

## 2020-07-21 ENCOUNTER — TELEPHONE (OUTPATIENT)
Dept: GASTROENTEROLOGY | Facility: CLINIC | Age: 55
End: 2020-07-21

## 2020-07-21 NOTE — TELEPHONE ENCOUNTER
Health maintenance updated. 3 year colonoscopy entered in patient outreach. Patient due on 07/18/23 per Dr. Lake Yuan.

## 2020-07-28 ENCOUNTER — TELEPHONE (OUTPATIENT)
Dept: SURGERY | Facility: CLINIC | Age: 55
End: 2020-07-28

## 2020-07-28 NOTE — TELEPHONE ENCOUNTER
Called patient about her medical clearance needed for her upcoming surgery on 8/6. She stated that Dr. Yuly Benjamin only needed labs done, and not medical clearance from the primary. I will route this to Nabor Ochoa to advise.

## 2020-07-30 ENCOUNTER — MOBILE ENCOUNTER (OUTPATIENT)
Dept: ANESTHESIOLOGY | Facility: HOSPITAL | Age: 55
End: 2020-07-30

## 2020-08-06 ENCOUNTER — ANESTHESIA (OUTPATIENT)
Dept: SURGERY | Facility: HOSPITAL | Age: 55
End: 2020-08-06
Payer: COMMERCIAL

## 2020-08-06 ENCOUNTER — ANESTHESIA EVENT (OUTPATIENT)
Dept: SURGERY | Facility: HOSPITAL | Age: 55
End: 2020-08-06
Payer: COMMERCIAL

## 2020-08-06 ENCOUNTER — HOSPITAL ENCOUNTER (OUTPATIENT)
Facility: HOSPITAL | Age: 55
Setting detail: HOSPITAL OUTPATIENT SURGERY
Discharge: HOME | End: 2020-08-06
Attending: SURGERY | Admitting: SURGERY
Payer: COMMERCIAL

## 2020-08-06 VITALS
DIASTOLIC BLOOD PRESSURE: 84 MMHG | BODY MASS INDEX: 21.24 KG/M2 | OXYGEN SATURATION: 98 % | TEMPERATURE: 98 F | RESPIRATION RATE: 14 BRPM | HEART RATE: 94 BPM | WEIGHT: 135.31 LBS | SYSTOLIC BLOOD PRESSURE: 130 MMHG | HEIGHT: 67 IN

## 2020-08-06 DIAGNOSIS — Z01.818 PRE-OP TESTING: Primary | ICD-10-CM

## 2020-08-06 DIAGNOSIS — Z90.13 ABSENCE OF BREAST, BILATERAL: ICD-10-CM

## 2020-08-06 LAB
GLUCOSE BLDC GLUCOMTR-MCNC: 113 MG/DL (ref 70–99)
GLUCOSE BLDC GLUCOMTR-MCNC: 130 MG/DL (ref 70–99)
GLUCOSE BLDC GLUCOMTR-MCNC: 136 MG/DL (ref 70–99)
SARS-COV-2 RNA RESP QL NAA+PROBE: NOT DETECTED

## 2020-08-06 PROCEDURE — 88305 TISSUE EXAM BY PATHOLOGIST: CPT | Performed by: SURGERY

## 2020-08-06 PROCEDURE — 0JD83ZZ EXTRACTION OF ABDOMEN SUBCUTANEOUS TISSUE AND FASCIA, PERCUTANEOUS APPROACH: ICD-10-PCS | Performed by: SURGERY

## 2020-08-06 PROCEDURE — 36415 COLL VENOUS BLD VENIPUNCTURE: CPT | Performed by: SURGERY

## 2020-08-06 PROCEDURE — 0HPU0NZ REMOVAL OF TISSUE EXPANDER FROM LEFT BREAST, OPEN APPROACH: ICD-10-PCS | Performed by: SURGERY

## 2020-08-06 PROCEDURE — S0077 INJECTION, CLINDAMYCIN PHOSP: HCPCS

## 2020-08-06 PROCEDURE — 0HPT0NZ REMOVAL OF TISSUE EXPANDER FROM RIGHT BREAST, OPEN APPROACH: ICD-10-PCS | Performed by: SURGERY

## 2020-08-06 PROCEDURE — 0HUV37Z SUPPLEMENT BILATERAL BREAST WITH AUTOLOGOUS TISSUE SUBSTITUTE, PERCUTANEOUS APPROACH: ICD-10-PCS | Performed by: SURGERY

## 2020-08-06 PROCEDURE — 82962 GLUCOSE BLOOD TEST: CPT

## 2020-08-06 PROCEDURE — 88300 SURGICAL PATH GROSS: CPT | Performed by: SURGERY

## 2020-08-06 PROCEDURE — 0HRV0JZ REPLACEMENT OF BILATERAL BREAST WITH SYNTHETIC SUBSTITUTE, OPEN APPROACH: ICD-10-PCS | Performed by: SURGERY

## 2020-08-06 RX ORDER — MEPERIDINE HYDROCHLORIDE 25 MG/ML
12.5 INJECTION INTRAMUSCULAR; INTRAVENOUS; SUBCUTANEOUS ONCE
Status: COMPLETED | OUTPATIENT
Start: 2020-08-06 | End: 2020-08-06

## 2020-08-06 RX ORDER — METOCLOPRAMIDE 10 MG/1
10 TABLET ORAL ONCE
Status: DISCONTINUED | OUTPATIENT
Start: 2020-08-06 | End: 2020-08-06 | Stop reason: HOSPADM

## 2020-08-06 RX ORDER — SODIUM CHLORIDE, SODIUM LACTATE, POTASSIUM CHLORIDE, CALCIUM CHLORIDE 600; 310; 30; 20 MG/100ML; MG/100ML; MG/100ML; MG/100ML
INJECTION, SOLUTION INTRAVENOUS CONTINUOUS
Status: DISCONTINUED | OUTPATIENT
Start: 2020-08-06 | End: 2020-08-06

## 2020-08-06 RX ORDER — LIDOCAINE HYDROCHLORIDE AND EPINEPHRINE 10; 10 MG/ML; UG/ML
INJECTION, SOLUTION INFILTRATION; PERINEURAL AS NEEDED
Status: DISCONTINUED | OUTPATIENT
Start: 2020-08-06 | End: 2020-08-06 | Stop reason: HOSPADM

## 2020-08-06 RX ORDER — LIDOCAINE HYDROCHLORIDE 40 MG/ML
SOLUTION TOPICAL AS NEEDED
Status: DISCONTINUED | OUTPATIENT
Start: 2020-08-06 | End: 2020-08-06 | Stop reason: SURG

## 2020-08-06 RX ORDER — MORPHINE SULFATE 10 MG/ML
6 INJECTION, SOLUTION INTRAMUSCULAR; INTRAVENOUS EVERY 10 MIN PRN
Status: DISCONTINUED | OUTPATIENT
Start: 2020-08-06 | End: 2020-08-06

## 2020-08-06 RX ORDER — CLINDAMYCIN PHOSPHATE 150 MG/ML
INJECTION, SOLUTION INTRAVENOUS AS NEEDED
Status: DISCONTINUED | OUTPATIENT
Start: 2020-08-06 | End: 2020-08-06 | Stop reason: SURG

## 2020-08-06 RX ORDER — LIDOCAINE HYDROCHLORIDE 10 MG/ML
INJECTION, SOLUTION EPIDURAL; INFILTRATION; INTRACAUDAL; PERINEURAL AS NEEDED
Status: DISCONTINUED | OUTPATIENT
Start: 2020-08-06 | End: 2020-08-06 | Stop reason: SURG

## 2020-08-06 RX ORDER — ONDANSETRON 2 MG/ML
4 INJECTION INTRAMUSCULAR; INTRAVENOUS ONCE AS NEEDED
Status: DISCONTINUED | OUTPATIENT
Start: 2020-08-06 | End: 2020-08-06

## 2020-08-06 RX ORDER — ROCURONIUM BROMIDE 10 MG/ML
INJECTION, SOLUTION INTRAVENOUS AS NEEDED
Status: DISCONTINUED | OUTPATIENT
Start: 2020-08-06 | End: 2020-08-06 | Stop reason: SURG

## 2020-08-06 RX ORDER — HYDROMORPHONE HYDROCHLORIDE 1 MG/ML
0.6 INJECTION, SOLUTION INTRAMUSCULAR; INTRAVENOUS; SUBCUTANEOUS EVERY 5 MIN PRN
Status: DISCONTINUED | OUTPATIENT
Start: 2020-08-06 | End: 2020-08-06

## 2020-08-06 RX ORDER — MORPHINE SULFATE 4 MG/ML
4 INJECTION, SOLUTION INTRAMUSCULAR; INTRAVENOUS EVERY 10 MIN PRN
Status: DISCONTINUED | OUTPATIENT
Start: 2020-08-06 | End: 2020-08-06

## 2020-08-06 RX ORDER — HYDROMORPHONE HYDROCHLORIDE 1 MG/ML
0.4 INJECTION, SOLUTION INTRAMUSCULAR; INTRAVENOUS; SUBCUTANEOUS EVERY 5 MIN PRN
Status: DISCONTINUED | OUTPATIENT
Start: 2020-08-06 | End: 2020-08-06

## 2020-08-06 RX ORDER — DOCUSATE SODIUM 100 MG/1
100 CAPSULE, LIQUID FILLED ORAL 2 TIMES DAILY PRN
Qty: 20 CAPSULE | Refills: 1 | Status: SHIPPED | OUTPATIENT
Start: 2020-08-06 | End: 2020-12-16 | Stop reason: ALTCHOICE

## 2020-08-06 RX ORDER — ONDANSETRON 2 MG/ML
INJECTION INTRAMUSCULAR; INTRAVENOUS AS NEEDED
Status: DISCONTINUED | OUTPATIENT
Start: 2020-08-06 | End: 2020-08-06 | Stop reason: SURG

## 2020-08-06 RX ORDER — EPHEDRINE SULFATE 50 MG/ML
INJECTION, SOLUTION INTRAVENOUS AS NEEDED
Status: DISCONTINUED | OUTPATIENT
Start: 2020-08-06 | End: 2020-08-06 | Stop reason: SURG

## 2020-08-06 RX ORDER — MIDAZOLAM HYDROCHLORIDE 1 MG/ML
INJECTION INTRAMUSCULAR; INTRAVENOUS AS NEEDED
Status: DISCONTINUED | OUTPATIENT
Start: 2020-08-06 | End: 2020-08-06 | Stop reason: SURG

## 2020-08-06 RX ORDER — CLINDAMYCIN PHOSPHATE 900 MG/50ML
900 INJECTION INTRAVENOUS ONCE
Status: DISCONTINUED | OUTPATIENT
Start: 2020-08-06 | End: 2020-08-06 | Stop reason: HOSPADM

## 2020-08-06 RX ORDER — HYDROCODONE BITARTRATE AND ACETAMINOPHEN 5; 325 MG/1; MG/1
1 TABLET ORAL AS NEEDED
Status: DISCONTINUED | OUTPATIENT
Start: 2020-08-06 | End: 2020-08-06

## 2020-08-06 RX ORDER — NEOSTIGMINE METHYLSULFATE 1 MG/ML
INJECTION INTRAVENOUS AS NEEDED
Status: DISCONTINUED | OUTPATIENT
Start: 2020-08-06 | End: 2020-08-06 | Stop reason: SURG

## 2020-08-06 RX ORDER — FAMOTIDINE 20 MG/1
20 TABLET ORAL ONCE
Status: DISCONTINUED | OUTPATIENT
Start: 2020-08-06 | End: 2020-08-06 | Stop reason: HOSPADM

## 2020-08-06 RX ORDER — ONDANSETRON 4 MG/1
4 TABLET, FILM COATED ORAL EVERY 8 HOURS PRN
Qty: 20 TABLET | Refills: 1 | Status: SHIPPED | OUTPATIENT
Start: 2020-08-06 | End: 2020-12-16 | Stop reason: ALTCHOICE

## 2020-08-06 RX ORDER — SULFAMETHOXAZOLE AND TRIMETHOPRIM 800; 160 MG/1; MG/1
1 TABLET ORAL 2 TIMES DAILY
Qty: 20 TABLET | Refills: 0 | Status: SHIPPED | OUTPATIENT
Start: 2020-08-06 | End: 2020-08-16

## 2020-08-06 RX ORDER — NALOXONE HYDROCHLORIDE 0.4 MG/ML
80 INJECTION, SOLUTION INTRAMUSCULAR; INTRAVENOUS; SUBCUTANEOUS AS NEEDED
Status: DISCONTINUED | OUTPATIENT
Start: 2020-08-06 | End: 2020-08-06

## 2020-08-06 RX ORDER — DEXTROSE MONOHYDRATE 25 G/50ML
50 INJECTION, SOLUTION INTRAVENOUS
Status: DISCONTINUED | OUTPATIENT
Start: 2020-08-06 | End: 2020-08-06

## 2020-08-06 RX ORDER — DEXAMETHASONE SODIUM PHOSPHATE 4 MG/ML
VIAL (ML) INJECTION AS NEEDED
Status: DISCONTINUED | OUTPATIENT
Start: 2020-08-06 | End: 2020-08-06 | Stop reason: SURG

## 2020-08-06 RX ORDER — OXYCODONE HYDROCHLORIDE AND ACETAMINOPHEN 5; 325 MG/1; MG/1
1-2 TABLET ORAL EVERY 6 HOURS PRN
Qty: 40 TABLET | Refills: 0 | Status: ON HOLD | OUTPATIENT
Start: 2020-08-06 | End: 2020-12-28

## 2020-08-06 RX ORDER — HALOPERIDOL 5 MG/ML
0.25 INJECTION INTRAMUSCULAR ONCE AS NEEDED
Status: DISCONTINUED | OUTPATIENT
Start: 2020-08-06 | End: 2020-08-06

## 2020-08-06 RX ORDER — MORPHINE SULFATE 4 MG/ML
2 INJECTION, SOLUTION INTRAMUSCULAR; INTRAVENOUS EVERY 10 MIN PRN
Status: DISCONTINUED | OUTPATIENT
Start: 2020-08-06 | End: 2020-08-06

## 2020-08-06 RX ORDER — GLYCOPYRROLATE 0.2 MG/ML
INJECTION, SOLUTION INTRAMUSCULAR; INTRAVENOUS AS NEEDED
Status: DISCONTINUED | OUTPATIENT
Start: 2020-08-06 | End: 2020-08-06 | Stop reason: SURG

## 2020-08-06 RX ORDER — ACETAMINOPHEN 500 MG
1000 TABLET ORAL ONCE
Status: COMPLETED | OUTPATIENT
Start: 2020-08-06 | End: 2020-08-06

## 2020-08-06 RX ORDER — HYDROCODONE BITARTRATE AND ACETAMINOPHEN 5; 325 MG/1; MG/1
2 TABLET ORAL AS NEEDED
Status: DISCONTINUED | OUTPATIENT
Start: 2020-08-06 | End: 2020-08-06

## 2020-08-06 RX ORDER — PROCHLORPERAZINE EDISYLATE 5 MG/ML
5 INJECTION INTRAMUSCULAR; INTRAVENOUS ONCE AS NEEDED
Status: DISCONTINUED | OUTPATIENT
Start: 2020-08-06 | End: 2020-08-06

## 2020-08-06 RX ORDER — PHENYLEPHRINE HCL 10 MG/ML
VIAL (ML) INJECTION AS NEEDED
Status: DISCONTINUED | OUTPATIENT
Start: 2020-08-06 | End: 2020-08-06 | Stop reason: SURG

## 2020-08-06 RX ORDER — HYDROMORPHONE HYDROCHLORIDE 1 MG/ML
0.2 INJECTION, SOLUTION INTRAMUSCULAR; INTRAVENOUS; SUBCUTANEOUS EVERY 5 MIN PRN
Status: DISCONTINUED | OUTPATIENT
Start: 2020-08-06 | End: 2020-08-06

## 2020-08-06 RX ADMIN — LIDOCAINE HYDROCHLORIDE 25 MG: 10 INJECTION, SOLUTION EPIDURAL; INFILTRATION; INTRACAUDAL; PERINEURAL at 07:34:00

## 2020-08-06 RX ADMIN — ROCURONIUM BROMIDE 50 MG: 10 INJECTION, SOLUTION INTRAVENOUS at 07:34:00

## 2020-08-06 RX ADMIN — PHENYLEPHRINE HCL 100 MCG: 10 MG/ML VIAL (ML) INJECTION at 08:07:00

## 2020-08-06 RX ADMIN — ONDANSETRON 4 MG: 2 INJECTION INTRAMUSCULAR; INTRAVENOUS at 11:23:00

## 2020-08-06 RX ADMIN — ONDANSETRON 4 MG: 2 INJECTION INTRAMUSCULAR; INTRAVENOUS at 07:34:00

## 2020-08-06 RX ADMIN — DEXAMETHASONE SODIUM PHOSPHATE 4 MG: 4 MG/ML VIAL (ML) INJECTION at 07:34:00

## 2020-08-06 RX ADMIN — GLYCOPYRROLATE 0.4 MG: 0.2 INJECTION, SOLUTION INTRAMUSCULAR; INTRAVENOUS at 11:30:00

## 2020-08-06 RX ADMIN — LIDOCAINE HYDROCHLORIDE 4 ML: 40 SOLUTION TOPICAL at 07:34:00

## 2020-08-06 RX ADMIN — NEOSTIGMINE METHYLSULFATE 2 MG: 1 INJECTION INTRAVENOUS at 11:30:00

## 2020-08-06 RX ADMIN — CLINDAMYCIN PHOSPHATE 900 MG: 150 INJECTION, SOLUTION INTRAVENOUS at 07:40:00

## 2020-08-06 RX ADMIN — EPHEDRINE SULFATE 5 MG: 50 INJECTION, SOLUTION INTRAVENOUS at 09:40:00

## 2020-08-06 RX ADMIN — MIDAZOLAM HYDROCHLORIDE 2 MG: 1 INJECTION INTRAMUSCULAR; INTRAVENOUS at 07:34:00

## 2020-08-06 RX ADMIN — SODIUM CHLORIDE, SODIUM LACTATE, POTASSIUM CHLORIDE, CALCIUM CHLORIDE: 600; 310; 30; 20 INJECTION, SOLUTION INTRAVENOUS at 10:31:00

## 2020-08-06 RX ADMIN — PHENYLEPHRINE HCL 100 MCG: 10 MG/ML VIAL (ML) INJECTION at 07:51:00

## 2020-08-06 RX ADMIN — PHENYLEPHRINE HCL 100 MCG: 10 MG/ML VIAL (ML) INJECTION at 10:50:00

## 2020-08-06 RX ADMIN — EPHEDRINE SULFATE 5 MG: 50 INJECTION, SOLUTION INTRAVENOUS at 08:11:00

## 2020-08-06 RX ADMIN — SODIUM CHLORIDE, SODIUM LACTATE, POTASSIUM CHLORIDE, CALCIUM CHLORIDE: 600; 310; 30; 20 INJECTION, SOLUTION INTRAVENOUS at 07:34:00

## 2020-08-06 RX ADMIN — PHENYLEPHRINE HCL 100 MCG: 10 MG/ML VIAL (ML) INJECTION at 09:30:00

## 2020-08-06 NOTE — ANESTHESIA PREPROCEDURE EVALUATION
Anesthesia PreOp Note    HPI:     Pearl Jarrell is a 47year old female who presents for preoperative consultation requested by: Ynes George MD    Date of Surgery: 8/6/2020    Procedure(s):  BREAST RECONSTRUCTION SECOND STAGE/ REVISION  Riya 04/07/2019      Chest pain, atypical         Date Noted: 12/26/2018      Viral warts         Date Noted: 06/09/2016      Controlled type 2 diabetes mellitus without complication, without long-term current use of insulin ([facility]         Date Noted: 03/06/2015 3/21/2014   • COLONOSCOPY N/A 7/18/2020    Performed by Jaime Holland MD at [address] [place] [address] ENDOSCOPY   • COLONOSCOPY N/A 10/19/2019    Performed by Jaime Holland MD at [address] [place] [address] ENDOSCOPY   • ESOPHAGOGASTRODUODENOSCOPY (EGD) N/A 10/19/2019    Performed by Ivon Sanon MD PILLO  acetaminophen (TYLENOL EXTRA STRENGTH) tab 1,000 mg, 1,000 mg, Oral, Once, Elkin FERRO MD  famoTIDine (PEPCID) tab 20 mg, 20 mg, Oral, Once, Elkin FERRO MD  Metoclopramide HCl (REGLAN) tab 10 mg, 10 mg, Oral, Once, Socorro Goodwin MD  clind connections:        Talks on phone: Not on file        Gets together: Not on file        Attends [employer] service: Not on file        Active member of club or organization: Not on file        Attends meetings of clubs or organizations: Not on file oz). Her oral temperature is 98.6 °F (37 °C). Her blood pressure is 131/79 and her pulse is 69. Her respiration is 16 and oxygen saturation is 100%.     08/05/20  1327 08/06/20  0605   BP:  131/79   Pulse:  69   Resp:  16   Temp:  98.6 °F (37 °C)   TempSrc:

## 2020-08-06 NOTE — ANESTHESIA PROCEDURE NOTES
Airway  Date/Time: 8/6/2020 7:37 AM  Urgency: Elective    Airway not difficult    General Information and Staff    Patient location during procedure: OR  Anesthesiologist: Adelita Morales MD  Performed: anesthesiologist     Indications and Patient Condition

## 2020-08-06 NOTE — ANESTHESIA POSTPROCEDURE EVALUATION
Patient: Josesito Arizmendi    Procedure Summary     Date:  08/06/20 Room / Location:  [facility] OR 01 / [facility] OR    Anesthesia Start:  0619 Anesthesia Stop:  8870    Procedure:  BREAST RECONSTRUCTION SECOND STAGE/ REVISION (Bilateral ) Diagnosis:

## 2020-08-06 NOTE — BRIEF OP NOTE
Pre-Operative Diagnosis: Absence of breast, bilateral [Z90.13]     Post-Operative Diagnosis: Absence of breast, bilateral [Z90.13]      Procedure Performed:   Procedure(s):  Bilateral tissue expander removal, capsulotomy, capsulorrhaphy, and permanent impl
Fe Mcnamara(Resident)

## 2020-08-07 NOTE — OPERATIVE REPORT
[facility]    PATIENT'S NAME: BRI HERRERA   ATTENDING PHYSICIAN: Florence Fofana MD   OPERATING PHYSICIAN: Florence Fofana MD   PATIENT ACCOUNT#:   [id number]    LOCATION:  [address] 10  MEDICAL RECORD #:   [id number]       DA obtained. The sites were marked. The patient was then brought back to the operating room, placed in supine position. She was adequately padded. Sequential compression devices were applied. General endotracheal anesthesia administered.   Preop antibioti the capsule, and then 3-0 Vicryl sutures were used for the deep dermal layer of the inframammary fold incision, and 4-0 Monocryl subcuticular sutures for the skin.   On the right breast, an additional excision of skin was required laterally for better symme

## 2020-08-12 ENCOUNTER — TELEPHONE (OUTPATIENT)
Dept: ENDOCRINOLOGY | Facility: HOSPITAL | Age: 55
End: 2020-08-12

## 2020-08-13 ENCOUNTER — TELEPHONE (OUTPATIENT)
Dept: ENDOCRINOLOGY | Facility: HOSPITAL | Age: 55
End: 2020-08-13

## 2020-08-13 NOTE — TELEPHONE ENCOUNTER
Contacted Dheaveda to review current insulin pump settings. \"Home\" personal profile activated with basal setting of 0.21; I:C ratio 10; Correction factor: 80 BG target 110. Reviewed with her the use of sleep activity. All questions answered.  She verbali

## 2020-08-13 NOTE — TELEPHONE ENCOUNTER
Contaced Norman Duron per request to review Tandem Control IQ settings. Questions answered. I informed her I would contact Dr. Antonino Braswell office to verify pump settings.

## 2020-08-13 NOTE — TELEPHONE ENCOUNTER
Attempted to contact [facility] to verify pump settings as provided by Dr. Lambert Lopez office. No answer and unable to leave voicemail.

## 2020-08-18 RX ORDER — LETROZOLE 2.5 MG/1
TABLET, FILM COATED ORAL
Qty: 30 TABLET | Refills: 5 | Status: SHIPPED | OUTPATIENT
Start: 2020-08-18 | End: 2021-04-14

## 2020-08-21 ENCOUNTER — OFFICE VISIT (OUTPATIENT)
Dept: SURGERY | Facility: CLINIC | Age: 55
End: 2020-08-21
Payer: COMMERCIAL

## 2020-08-21 DIAGNOSIS — Z90.13 ABSENCE OF BOTH BREASTS: Primary | ICD-10-CM

## 2020-08-21 PROCEDURE — 99024 POSTOP FOLLOW-UP VISIT: CPT | Performed by: PHYSICIAN ASSISTANT

## 2020-08-21 RX ORDER — OXYCODONE HYDROCHLORIDE AND ACETAMINOPHEN 5; 325 MG/1; MG/1
1-2 TABLET ORAL EVERY 6 HOURS PRN
Qty: 20 TABLET | Refills: 0 | Status: ON HOLD | OUTPATIENT
Start: 2020-08-21 | End: 2020-12-28

## 2020-08-21 NOTE — PROGRESS NOTES
Gogo Varela is a 47year old female who presents today for a follow-up after removal of bilateral tissue expanders, capsulotomy and capsulorrhaphy, placement of permanent silicone gel implants ([place] MemoryGel Xtra breast silicone implant, Mo

## 2020-09-04 ENCOUNTER — OFFICE VISIT (OUTPATIENT)
Dept: SURGERY | Facility: CLINIC | Age: 55
End: 2020-09-04
Payer: COMMERCIAL

## 2020-09-04 DIAGNOSIS — Z90.13 ABSENCE OF BREAST, BILATERAL: Primary | ICD-10-CM

## 2020-09-04 PROCEDURE — 99024 POSTOP FOLLOW-UP VISIT: CPT | Performed by: SURGERY

## 2020-09-04 NOTE — PROGRESS NOTES
Carolyn Navarro is a 54year old female who presents today for a follow-up s/p removal of bilateral tissue expanders, capsulotomy and capsulorrhaphy, placement of permanent silicone gel implants ([place] MemoryGel Xtra breast silicone implant, Mode

## 2020-09-15 ENCOUNTER — APPOINTMENT (OUTPATIENT)
Dept: HEMATOLOGY/ONCOLOGY | Facility: HOSPITAL | Age: 55
End: 2020-09-15
Payer: COMMERCIAL

## 2020-09-29 ENCOUNTER — TELEPHONE (OUTPATIENT)
Dept: HEMATOLOGY/ONCOLOGY | Facility: HOSPITAL | Age: 55
End: 2020-09-29

## 2020-09-29 NOTE — TELEPHONE ENCOUNTER
Pt cancelled Dr. Butch Howard appointment on 9/15 and no show on 9/21. Pt called and left message to please call [phone] to reschedule appointment.

## 2020-12-04 ENCOUNTER — OFFICE VISIT (OUTPATIENT)
Dept: SURGERY | Facility: CLINIC | Age: 55
End: 2020-12-04
Payer: COMMERCIAL

## 2020-12-04 DIAGNOSIS — Z90.13 ABSENCE OF BOTH BREASTS: Primary | ICD-10-CM

## 2020-12-04 PROCEDURE — 99213 OFFICE O/P EST LOW 20 MIN: CPT | Performed by: SURGERY

## 2020-12-04 NOTE — PATIENT INSTRUCTIONS
Surgeon:              Dr. Syl Hastings, PhD                                          Tel:         [phone]                                  Fax:        [phone]    Surgery/Procedure:    Bilateral breast implant exchange, capsulotomy, capsulorrhaph

## 2020-12-04 NOTE — H&P (VIEW-ONLY)
Estabilshed Patient Consultation    Chief Complaint:BL absence of breasts  History of Present Illness:   Laneta Alpers is a 54year old female who underwent BL mastectomy for breast cancer and had implant based reconstruction.  She now has increa IMMEDIATE/EXPANDER Bilateral 12/23/2019    Performed by Brunilda Jama., MD at [address] [place] [address] OR   • BREAST SENTINEL LYMPH NODE BIOPSY Right 12/23/2019    Performed by Janeth Borden MD at [address] [place] [address] OR   • CHEMOTHERAPY  1984    Hodgkin;s disease-3B   • COLONOS Sliding scale based on carb intake at meals  , Disp: , Rfl:     •  insulin glargine 100 UNIT/ML Subcutaneous Solution, Inject 5 Units into the skin nightly.  , Disp: , Rfl:     •  omeprazole 20 MG Oral Capsule Delayed Release, Take 20 mg by mouth 2 (two) t breast with lateral scar contracture also some rippling, slightly higher than R  Respiratory: Normal respiratory effort.      Cardiovascular: no cyanosis, no edema    Musculoskeletal: Extremities unremarkable, without edema, tenderness or deformities

## 2020-12-04 NOTE — CONSULTS
Estabilshed Patient Consultation    Chief Complaint:BL absence of breasts  History of Present Illness:   Alena Patience is a 54year old female who underwent BL mastectomy for breast cancer and had implant based reconstruction.  She now has increa IMMEDIATE/EXPANDER Bilateral 12/23/2019    Performed by Brunilda Jama., MD at [address] [place] [address] OR   • BREAST SENTINEL LYMPH NODE BIOPSY Right 12/23/2019    Performed by Janeth Borden MD at [address] [place] [address] OR   • CHEMOTHERAPY  1984    Hodgkin;s disease-3B   • COLONOS Sliding scale based on carb intake at meals  , Disp: , Rfl:     •  insulin glargine 100 UNIT/ML Subcutaneous Solution, Inject 5 Units into the skin nightly.  , Disp: , Rfl:     •  omeprazole 20 MG Oral Capsule Delayed Release, Take 20 mg by mouth 2 (two) t breast with lateral scar contracture also some rippling, slightly higher than R  Respiratory: Normal respiratory effort.      Cardiovascular: no cyanosis, no edema    Musculoskeletal: Extremities unremarkable, without edema, tenderness or deformities

## 2020-12-11 ENCOUNTER — TELEPHONE (OUTPATIENT)
Dept: SURGERY | Facility: CLINIC | Age: 55
End: 2020-12-11

## 2020-12-11 DIAGNOSIS — Z90.13 STATUS POST BILATERAL MASTECTOMY: Primary | ICD-10-CM

## 2020-12-11 NOTE — TELEPHONE ENCOUNTER
I called the patient and scheduled her procedure with Dr Eric Almodovar on 02/18/2021 at [facility]. Confirmed date and location and added her to the wait list. She is happy with either location to be moved up.

## 2020-12-15 ENCOUNTER — TELEPHONE (OUTPATIENT)
Dept: SURGERY | Facility: CLINIC | Age: 55
End: 2020-12-15

## 2020-12-15 DIAGNOSIS — Z90.13 STATUS POST BILATERAL MASTECTOMY: Primary | ICD-10-CM

## 2020-12-15 DIAGNOSIS — Z90.13 ABSENCE OF BOTH BREASTS: Primary | ICD-10-CM

## 2020-12-15 NOTE — TELEPHONE ENCOUNTER
I called the patient and rescheduled her procedure with Dr Mich Spivey on 12/28/2020 now at [facility]. Confirmed date and location

## 2020-12-16 DIAGNOSIS — Z90.13 ABSENCE OF BOTH BREASTS: Primary | ICD-10-CM

## 2020-12-16 DIAGNOSIS — Z01.818 PREOP EXAMINATION: ICD-10-CM

## 2020-12-16 RX ORDER — ACETAMINOPHEN 500 MG
1000 TABLET ORAL ONCE
Status: CANCELLED | OUTPATIENT
Start: 2020-12-16 | End: 2020-12-16

## 2020-12-16 NOTE — PROGRESS NOTES
Awaiting notification/ orders from Dr. Shala Campuzano and Dr. Alejandro Ly regarding  pre op testing needed. Patient states was told it was not needed. Per Brenda Shepherd, Dr. Alejandro Ly has waived pre op testing for pt, awaiting Dr. Shala Campuzano response.

## 2020-12-22 ENCOUNTER — LAB ENCOUNTER (OUTPATIENT)
Dept: LAB | Facility: HOSPITAL | Age: 55
End: 2020-12-22
Attending: PHYSICIAN ASSISTANT
Payer: COMMERCIAL

## 2020-12-22 ENCOUNTER — TELEPHONE (OUTPATIENT)
Dept: SURGERY | Facility: CLINIC | Age: 55
End: 2020-12-22

## 2020-12-22 DIAGNOSIS — Z90.13 ABSENCE OF BOTH BREASTS: ICD-10-CM

## 2020-12-22 DIAGNOSIS — Z01.818 PREOP EXAMINATION: ICD-10-CM

## 2020-12-22 PROCEDURE — 80053 COMPREHEN METABOLIC PANEL: CPT

## 2020-12-22 PROCEDURE — 36415 COLL VENOUS BLD VENIPUNCTURE: CPT

## 2020-12-22 PROCEDURE — 85025 COMPLETE CBC W/AUTO DIFF WBC: CPT

## 2020-12-22 NOTE — TELEPHONE ENCOUNTER
LVM to give patient a reminder that the labs are ordered and need to be collected prior to her upcoming surgery.

## 2020-12-26 ENCOUNTER — LAB ENCOUNTER (OUTPATIENT)
Dept: LAB | Age: 55
End: 2020-12-26
Attending: SURGERY
Payer: COMMERCIAL

## 2020-12-26 DIAGNOSIS — Z90.13 STATUS POST BILATERAL MASTECTOMY: ICD-10-CM

## 2020-12-28 ENCOUNTER — HOSPITAL ENCOUNTER (OUTPATIENT)
Facility: HOSPITAL | Age: 55
Setting detail: HOSPITAL OUTPATIENT SURGERY
Discharge: HOME | End: 2020-12-28
Attending: SURGERY | Admitting: SURGERY
Payer: COMMERCIAL

## 2020-12-28 ENCOUNTER — ANESTHESIA EVENT (OUTPATIENT)
Dept: SURGERY | Facility: HOSPITAL | Age: 55
End: 2020-12-28
Payer: COMMERCIAL

## 2020-12-28 ENCOUNTER — ANESTHESIA (OUTPATIENT)
Dept: SURGERY | Facility: HOSPITAL | Age: 55
End: 2020-12-28
Payer: COMMERCIAL

## 2020-12-28 VITALS
SYSTOLIC BLOOD PRESSURE: 139 MMHG | DIASTOLIC BLOOD PRESSURE: 80 MMHG | WEIGHT: 140.63 LBS | OXYGEN SATURATION: 98 % | HEART RATE: 77 BPM | TEMPERATURE: 98 F | RESPIRATION RATE: 16 BRPM | BODY MASS INDEX: 22.07 KG/M2 | HEIGHT: 67 IN

## 2020-12-28 DIAGNOSIS — Z90.13 STATUS POST BILATERAL MASTECTOMY: Primary | ICD-10-CM

## 2020-12-28 DIAGNOSIS — Z90.13 ABSENCE OF BREAST, BILATERAL: ICD-10-CM

## 2020-12-28 PROCEDURE — 0HRV0JZ REPLACEMENT OF BILATERAL BREAST WITH SYNTHETIC SUBSTITUTE, OPEN APPROACH: ICD-10-PCS | Performed by: SURGERY

## 2020-12-28 PROCEDURE — 0HPU0JZ REMOVAL OF SYNTHETIC SUBSTITUTE FROM LEFT BREAST, OPEN APPROACH: ICD-10-PCS | Performed by: SURGERY

## 2020-12-28 PROCEDURE — 82962 GLUCOSE BLOOD TEST: CPT

## 2020-12-28 PROCEDURE — 88305 TISSUE EXAM BY PATHOLOGIST: CPT | Performed by: SURGERY

## 2020-12-28 PROCEDURE — 0HPT0JZ REMOVAL OF SYNTHETIC SUBSTITUTE FROM RIGHT BREAST, OPEN APPROACH: ICD-10-PCS | Performed by: SURGERY

## 2020-12-28 PROCEDURE — 0HUV37Z SUPPLEMENT BILATERAL BREAST WITH AUTOLOGOUS TISSUE SUBSTITUTE, PERCUTANEOUS APPROACH: ICD-10-PCS | Performed by: SURGERY

## 2020-12-28 RX ORDER — SODIUM CHLORIDE, SODIUM LACTATE, POTASSIUM CHLORIDE, CALCIUM CHLORIDE 600; 310; 30; 20 MG/100ML; MG/100ML; MG/100ML; MG/100ML
INJECTION, SOLUTION INTRAVENOUS CONTINUOUS
Status: DISCONTINUED | OUTPATIENT
Start: 2020-12-28 | End: 2020-12-28

## 2020-12-28 RX ORDER — ACETAMINOPHEN 500 MG
1000 TABLET ORAL ONCE
COMMUNITY

## 2020-12-28 RX ORDER — HYDROMORPHONE HYDROCHLORIDE 1 MG/ML
0.4 INJECTION, SOLUTION INTRAMUSCULAR; INTRAVENOUS; SUBCUTANEOUS EVERY 5 MIN PRN
Status: DISCONTINUED | OUTPATIENT
Start: 2020-12-28 | End: 2020-12-28

## 2020-12-28 RX ORDER — ONDANSETRON 2 MG/ML
INJECTION INTRAMUSCULAR; INTRAVENOUS AS NEEDED
Status: DISCONTINUED | OUTPATIENT
Start: 2020-12-28 | End: 2020-12-28 | Stop reason: SURG

## 2020-12-28 RX ORDER — CLINDAMYCIN PHOSPHATE 900 MG/50ML
900 INJECTION INTRAVENOUS ONCE
Status: COMPLETED | OUTPATIENT
Start: 2020-12-28 | End: 2020-12-28

## 2020-12-28 RX ORDER — CLINDAMYCIN HYDROCHLORIDE 300 MG/1
300 CAPSULE ORAL 3 TIMES DAILY
Qty: 21 CAPSULE | Refills: 0 | Status: SHIPPED | OUTPATIENT
Start: 2020-12-28 | End: 2021-01-04

## 2020-12-28 RX ORDER — LIDOCAINE HYDROCHLORIDE 10 MG/ML
INJECTION, SOLUTION EPIDURAL; INFILTRATION; INTRACAUDAL; PERINEURAL AS NEEDED
Status: DISCONTINUED | OUTPATIENT
Start: 2020-12-28 | End: 2020-12-28 | Stop reason: SURG

## 2020-12-28 RX ORDER — DEXTROSE MONOHYDRATE 25 G/50ML
50 INJECTION, SOLUTION INTRAVENOUS
Status: DISCONTINUED | OUTPATIENT
Start: 2020-12-28 | End: 2020-12-28 | Stop reason: HOSPADM

## 2020-12-28 RX ORDER — HYDROMORPHONE HYDROCHLORIDE 1 MG/ML
INJECTION, SOLUTION INTRAMUSCULAR; INTRAVENOUS; SUBCUTANEOUS
Status: COMPLETED
Start: 2020-12-28 | End: 2020-12-28

## 2020-12-28 RX ORDER — SCOLOPAMINE TRANSDERMAL SYSTEM 1 MG/1
PATCH, EXTENDED RELEASE TRANSDERMAL
Status: DISCONTINUED
Start: 2020-12-28 | End: 2020-12-28 | Stop reason: WASHOUT

## 2020-12-28 RX ORDER — HYDROCODONE BITARTRATE AND ACETAMINOPHEN 5; 325 MG/1; MG/1
1 TABLET ORAL AS NEEDED
Status: DISCONTINUED | OUTPATIENT
Start: 2020-12-28 | End: 2020-12-28

## 2020-12-28 RX ORDER — ONDANSETRON 4 MG/1
4 TABLET, FILM COATED ORAL EVERY 8 HOURS PRN
Qty: 20 TABLET | Refills: 1 | Status: SHIPPED | OUTPATIENT
Start: 2020-12-28 | End: 2022-01-26

## 2020-12-28 RX ORDER — DEXAMETHASONE SODIUM PHOSPHATE 4 MG/ML
4 VIAL (ML) INJECTION AS NEEDED
Status: DISCONTINUED | OUTPATIENT
Start: 2020-12-28 | End: 2020-12-28

## 2020-12-28 RX ORDER — MIDAZOLAM HYDROCHLORIDE 1 MG/ML
INJECTION INTRAMUSCULAR; INTRAVENOUS
Status: DISCONTINUED
Start: 2020-12-28 | End: 2020-12-28 | Stop reason: WASHOUT

## 2020-12-28 RX ORDER — HYDROCODONE BITARTRATE AND ACETAMINOPHEN 5; 325 MG/1; MG/1
2 TABLET ORAL AS NEEDED
Status: DISCONTINUED | OUTPATIENT
Start: 2020-12-28 | End: 2020-12-28

## 2020-12-28 RX ORDER — DOCUSATE SODIUM 100 MG/1
100 CAPSULE, LIQUID FILLED ORAL 2 TIMES DAILY PRN
Qty: 20 CAPSULE | Refills: 1 | Status: SHIPPED | OUTPATIENT
Start: 2020-12-28 | End: 2022-01-26

## 2020-12-28 RX ORDER — MIDAZOLAM HYDROCHLORIDE 1 MG/ML
INJECTION INTRAMUSCULAR; INTRAVENOUS AS NEEDED
Status: DISCONTINUED | OUTPATIENT
Start: 2020-12-28 | End: 2020-12-28 | Stop reason: SURG

## 2020-12-28 RX ORDER — BUPIVACAINE HYDROCHLORIDE 5 MG/ML
INJECTION, SOLUTION EPIDURAL; INTRACAUDAL AS NEEDED
Status: DISCONTINUED | OUTPATIENT
Start: 2020-12-28 | End: 2020-12-28 | Stop reason: HOSPADM

## 2020-12-28 RX ORDER — OXYCODONE HYDROCHLORIDE AND ACETAMINOPHEN 5; 325 MG/1; MG/1
1-2 TABLET ORAL ONCE AS NEEDED
Status: COMPLETED | OUTPATIENT
Start: 2020-12-28 | End: 2020-12-28

## 2020-12-28 RX ORDER — OXYCODONE HYDROCHLORIDE AND ACETAMINOPHEN 5; 325 MG/1; MG/1
1-2 TABLET ORAL EVERY 6 HOURS PRN
Qty: 40 TABLET | Refills: 0 | Status: ON HOLD | OUTPATIENT
Start: 2020-12-28 | End: 2021-05-02

## 2020-12-28 RX ORDER — ONDANSETRON 2 MG/ML
4 INJECTION INTRAMUSCULAR; INTRAVENOUS AS NEEDED
Status: DISCONTINUED | OUTPATIENT
Start: 2020-12-28 | End: 2020-12-28

## 2020-12-28 RX ORDER — METOCLOPRAMIDE HYDROCHLORIDE 5 MG/ML
10 INJECTION INTRAMUSCULAR; INTRAVENOUS AS NEEDED
Status: DISCONTINUED | OUTPATIENT
Start: 2020-12-28 | End: 2020-12-28

## 2020-12-28 RX ORDER — INSULIN ASPART 100 [IU]/ML
INJECTION, SOLUTION INTRAVENOUS; SUBCUTANEOUS ONCE
Status: DISCONTINUED | OUTPATIENT
Start: 2020-12-28 | End: 2020-12-28

## 2020-12-28 RX ORDER — DEXAMETHASONE SODIUM PHOSPHATE 4 MG/ML
VIAL (ML) INJECTION AS NEEDED
Status: DISCONTINUED | OUTPATIENT
Start: 2020-12-28 | End: 2020-12-28 | Stop reason: SURG

## 2020-12-28 RX ORDER — GLYCOPYRROLATE 0.2 MG/ML
INJECTION, SOLUTION INTRAMUSCULAR; INTRAVENOUS AS NEEDED
Status: DISCONTINUED | OUTPATIENT
Start: 2020-12-28 | End: 2020-12-28 | Stop reason: SURG

## 2020-12-28 RX ORDER — ROCURONIUM BROMIDE 10 MG/ML
INJECTION, SOLUTION INTRAVENOUS AS NEEDED
Status: DISCONTINUED | OUTPATIENT
Start: 2020-12-28 | End: 2020-12-28 | Stop reason: SURG

## 2020-12-28 RX ORDER — NEOSTIGMINE METHYLSULFATE 1 MG/ML
INJECTION INTRAVENOUS AS NEEDED
Status: DISCONTINUED | OUTPATIENT
Start: 2020-12-28 | End: 2020-12-28 | Stop reason: SURG

## 2020-12-28 RX ORDER — NALOXONE HYDROCHLORIDE 0.4 MG/ML
80 INJECTION, SOLUTION INTRAMUSCULAR; INTRAVENOUS; SUBCUTANEOUS AS NEEDED
Status: DISCONTINUED | OUTPATIENT
Start: 2020-12-28 | End: 2020-12-28

## 2020-12-28 RX ORDER — DEXTROSE MONOHYDRATE 25 G/50ML
50 INJECTION, SOLUTION INTRAVENOUS
Status: DISCONTINUED | OUTPATIENT
Start: 2020-12-28 | End: 2020-12-28

## 2020-12-28 RX ADMIN — SODIUM CHLORIDE, SODIUM LACTATE, POTASSIUM CHLORIDE, CALCIUM CHLORIDE: 600; 310; 30; 20 INJECTION, SOLUTION INTRAVENOUS at 13:39:00

## 2020-12-28 RX ADMIN — ROCURONIUM BROMIDE 20 MG: 10 INJECTION, SOLUTION INTRAVENOUS at 11:55:00

## 2020-12-28 RX ADMIN — LIDOCAINE HYDROCHLORIDE 50 MG: 10 INJECTION, SOLUTION EPIDURAL; INFILTRATION; INTRACAUDAL; PERINEURAL at 09:42:00

## 2020-12-28 RX ADMIN — DEXAMETHASONE SODIUM PHOSPHATE 8 MG: 4 MG/ML VIAL (ML) INJECTION at 12:21:00

## 2020-12-28 RX ADMIN — ROCURONIUM BROMIDE 50 MG: 10 INJECTION, SOLUTION INTRAVENOUS at 09:43:00

## 2020-12-28 RX ADMIN — GLYCOPYRROLATE 0.4 MG: 0.2 INJECTION, SOLUTION INTRAMUSCULAR; INTRAVENOUS at 13:17:00

## 2020-12-28 RX ADMIN — MIDAZOLAM HYDROCHLORIDE 1 MG: 1 INJECTION INTRAMUSCULAR; INTRAVENOUS at 09:41:00

## 2020-12-28 RX ADMIN — ONDANSETRON 4 MG: 2 INJECTION INTRAMUSCULAR; INTRAVENOUS at 12:21:00

## 2020-12-28 RX ADMIN — CLINDAMYCIN PHOSPHATE 900 MG: 900 INJECTION INTRAVENOUS at 09:50:00

## 2020-12-28 RX ADMIN — SODIUM CHLORIDE, SODIUM LACTATE, POTASSIUM CHLORIDE, CALCIUM CHLORIDE: 600; 310; 30; 20 INJECTION, SOLUTION INTRAVENOUS at 11:22:00

## 2020-12-28 RX ADMIN — ROCURONIUM BROMIDE 20 MG: 10 INJECTION, SOLUTION INTRAVENOUS at 10:40:00

## 2020-12-28 RX ADMIN — NEOSTIGMINE METHYLSULFATE 2 MG: 1 INJECTION INTRAVENOUS at 13:17:00

## 2020-12-28 NOTE — BRIEF OP NOTE
Pre-Operative Diagnosis: Status post bilateral mastectomy [Z90.13]     Post-Operative Diagnosis: Status post bilateral mastectomy [Z90.13]      Procedure Performed:   Procedure(s):  Bilateral breast implant exchange, capsulotomy, right breast capsulorraphy

## 2020-12-28 NOTE — ANESTHESIA PREPROCEDURE EVALUATION
PRE-OP EVALUATION    Patient Name: Mervin Lawrence    Pre-op Diagnosis: Status post bilateral mastectomy [Z90.13]    Procedure(s):  Bilateral breast implant exchange, capsulotomy, capsulorrhaphy, autologous fat grafting       Surgeon(s) and Role: MORNING  (Patient taking differently: 360 mg daily.  ), Disp: 60 capsule, Rfl: 3, 12/27/2020 at 0700    •  oxyCODONE-acetaminophen 5-325 MG Oral Tab, Take 1-2 tablets by mouth every 6 (six) hours as needed for Pain., Disp: 20 tablet, Rfl: 0, More than a mo MASTOPEXY Left 2/29/2020    Performed by Lisseth Call., MD at [address] [place] [address] MAIN OR   • BREAST RECONSTRUCTION SECOND STAGE/ REVISION Bilateral 8/6/2020    Performed by Lisseth Gonzalez MD at [address] [place] [address] OR   • BREAST RECONSTRUCTION/ IMMEDIATE/EXPANDER Left 6/11/2020 Cardiovascular    Cardiovascular exam normal.  Rhythm: regular  Rate: normal     Dental    No notable dental history.          Pulmonary    Pulmonary exam normal.                 Other findings            ASA: 2   Plan: general  NPO status verified and allan

## 2020-12-28 NOTE — ANESTHESIA POSTPROCEDURE EVALUATION
[address] Patient Status:  Hospital Outpatient Surgery   Age/Gender 54year old female MRN [id number]   Location [address] Attending Marce Schaefer MD   Hosp Day # 0 PCP Sukumar Keller MD

## 2020-12-28 NOTE — ANESTHESIA PROCEDURE NOTES
Airway  Date/Time: 12/28/2020 9:44 AM  Urgency: elective    Airway not difficult    General Information and Staff    Patient location during procedure: OR  Anesthesiologist: Alis Garsia MD  Performed: anesthesiologist     Indications and Patient Mendez Wu

## 2020-12-29 NOTE — OPERATIVE REPORT
[address]    PATIENT'S NAME: Yemi Casillastraci, [facility] A   ATTENDING PHYSICIAN: Florence Redd MD   OPERATING PHYSICIAN: Florence Redd MD   PATIENT ACCOUNT#:   [id number]    LOCATION:  [address] [place] [address] 6 EDWP 10  MEDICAL RECORD #:   [id number] complications, need for further surgery. Patient understands and wishes to proceed. Questions were answered. OPERATIVE TECHNIQUE:  The patient was identified in the preoperative area. Informed consent was obtained. The sites were marked.   The allan with antibiotic solution, and then a capsulotomy was performed medially and superiorly, and contracture was excised around the inframammary fold area.   Overall, there was less scar tissue than at the previous surgery, but still some tightness was encounter instrument, and needle counts were correct at the end of the case. (Also Job [id number])      Dictated By Mary Lynch.  Charline Bennett MD  d: 12/28/2020 17:11:56  t: 12/28/2020 19:48:29  Job [id number]  OJY/

## 2021-01-26 DIAGNOSIS — E11.9 DIABETES (HCC): Primary | ICD-10-CM

## 2021-01-29 ENCOUNTER — HOSPITAL ENCOUNTER (OUTPATIENT)
Dept: CT IMAGING | Facility: HOSPITAL | Age: 56
Discharge: HOME | End: 2021-01-29
Attending: UROLOGY
Payer: COMMERCIAL

## 2021-01-29 ENCOUNTER — LAB ENCOUNTER (OUTPATIENT)
Dept: LAB | Facility: HOSPITAL | Age: 56
End: 2021-01-29
Attending: UROLOGY
Payer: COMMERCIAL

## 2021-01-29 ENCOUNTER — HOSPITAL ENCOUNTER (OUTPATIENT)
Dept: GENERAL RADIOLOGY | Facility: HOSPITAL | Age: 56
Discharge: HOME | End: 2021-01-29
Attending: UROLOGY
Payer: COMMERCIAL

## 2021-01-29 DIAGNOSIS — N20.0 KIDNEY STONE: ICD-10-CM

## 2021-01-29 DIAGNOSIS — E11.9 DIABETES (HCC): ICD-10-CM

## 2021-01-29 LAB
ALBUMIN SERPL-MCNC: 4 G/DL (ref 3.4–5)
ALBUMIN/GLOB SERPL: 0.9 {RATIO} (ref 1–2)
ALP LIVER SERPL-CCNC: 139 U/L
ALT SERPL-CCNC: 21 U/L
ANION GAP SERPL CALC-SCNC: 5 MMOL/L (ref 0–18)
AST SERPL-CCNC: 13 U/L (ref 15–37)
BASOPHILS # BLD AUTO: 0.15 X10(3) UL (ref 0–0.2)
BASOPHILS NFR BLD AUTO: 1.4 %
BILIRUB SERPL-MCNC: 0.3 MG/DL (ref 0.1–2)
BUN BLD-MCNC: 17 MG/DL (ref 7–18)
BUN/CREAT SERPL: 20.2 (ref 10–20)
CALCIUM BLD-MCNC: 9.6 MG/DL (ref 8.5–10.1)
CHLORIDE SERPL-SCNC: 105 MMOL/L (ref 98–112)
CO2 SERPL-SCNC: 29 MMOL/L (ref 21–32)
CREAT BLD-MCNC: 0.84 MG/DL
DEPRECATED RDW RBC AUTO: 59.6 FL (ref 35.1–46.3)
EOSINOPHIL # BLD AUTO: 0.4 X10(3) UL (ref 0–0.7)
EOSINOPHIL NFR BLD AUTO: 3.7 %
ERYTHROCYTE [DISTWIDTH] IN BLOOD BY AUTOMATED COUNT: 19 % (ref 11–15)
EST. AVERAGE GLUCOSE BLD GHB EST-MCNC: 126 MG/DL (ref 68–126)
GLOBULIN PLAS-MCNC: 4.4 G/DL (ref 2.8–4.4)
GLUCOSE BLD-MCNC: 111 MG/DL (ref 70–99)
HBA1C MFR BLD HPLC: 6 % (ref ?–5.7)
HCT VFR BLD AUTO: 36.4 %
HGB BLD-MCNC: 11.3 G/DL
IMM GRANULOCYTES # BLD AUTO: 0.04 X10(3) UL (ref 0–1)
IMM GRANULOCYTES NFR BLD: 0.4 %
LYMPHOCYTES # BLD AUTO: 3.43 X10(3) UL (ref 1–4)
LYMPHOCYTES NFR BLD AUTO: 31.5 %
M PROTEIN MFR SERPL ELPH: 8.4 G/DL (ref 6.4–8.2)
MCH RBC QN AUTO: 26.5 PG (ref 26–34)
MCHC RBC AUTO-ENTMCNC: 31 G/DL (ref 31–37)
MCV RBC AUTO: 85.2 FL
MONOCYTES # BLD AUTO: 0.93 X10(3) UL (ref 0.1–1)
MONOCYTES NFR BLD AUTO: 8.5 %
NEUTROPHILS # BLD AUTO: 5.93 X10 (3) UL (ref 1.5–7.7)
NEUTROPHILS # BLD AUTO: 5.93 X10(3) UL (ref 1.5–7.7)
NEUTROPHILS NFR BLD AUTO: 54.5 %
OSMOLALITY SERPL CALC.SUM OF ELEC: 290 MOSM/KG (ref 275–295)
PATIENT FASTING Y/N/NP: YES
PLATELET # BLD AUTO: 600 10(3)UL (ref 150–450)
POTASSIUM SERPL-SCNC: 4.2 MMOL/L (ref 3.5–5.1)
RBC # BLD AUTO: 4.27 X10(6)UL
SODIUM SERPL-SCNC: 139 MMOL/L (ref 136–145)
WBC # BLD AUTO: 10.9 X10(3) UL (ref 4–11)

## 2021-01-29 PROCEDURE — 80053 COMPREHEN METABOLIC PANEL: CPT

## 2021-01-29 PROCEDURE — 74176 CT ABD & PELVIS W/O CONTRAST: CPT | Performed by: UROLOGY

## 2021-01-29 PROCEDURE — 74018 RADEX ABDOMEN 1 VIEW: CPT | Performed by: UROLOGY

## 2021-01-29 PROCEDURE — 85025 COMPLETE CBC W/AUTO DIFF WBC: CPT

## 2021-01-29 PROCEDURE — 36415 COLL VENOUS BLD VENIPUNCTURE: CPT

## 2021-01-29 PROCEDURE — 83036 HEMOGLOBIN GLYCOSYLATED A1C: CPT

## 2021-03-29 ENCOUNTER — IMMUNIZATION (OUTPATIENT)
Dept: LAB | Facility: HOSPITAL | Age: 56
End: 2021-03-29
Attending: EMERGENCY MEDICINE
Payer: COMMERCIAL

## 2021-03-29 DIAGNOSIS — Z23 NEED FOR VACCINATION: Primary | ICD-10-CM

## 2021-03-29 PROCEDURE — 0011A SARSCOV2 VAC 100MCG/0.5ML IM: CPT

## 2021-04-14 RX ORDER — LETROZOLE 2.5 MG/1
2.5 TABLET, FILM COATED ORAL DAILY
Qty: 30 TABLET | Refills: 5 | Status: SHIPPED | OUTPATIENT
Start: 2021-04-14 | End: 2021-05-21

## 2021-04-30 ENCOUNTER — APPOINTMENT (OUTPATIENT)
Dept: CT IMAGING | Facility: HOSPITAL | Age: 56
DRG: 641 | End: 2021-04-30
Attending: EMERGENCY MEDICINE
Payer: COMMERCIAL

## 2021-04-30 ENCOUNTER — HOSPITAL ENCOUNTER (INPATIENT)
Facility: HOSPITAL | Age: 56
LOS: 2 days | Discharge: HOME | DRG: 641 | End: 2021-05-02
Attending: EMERGENCY MEDICINE | Admitting: HOSPITALIST
Payer: COMMERCIAL

## 2021-04-30 DIAGNOSIS — E87.1 HYPONATREMIA: Primary | ICD-10-CM

## 2021-04-30 DIAGNOSIS — K59.00 CONSTIPATION, UNSPECIFIED CONSTIPATION TYPE: ICD-10-CM

## 2021-04-30 DIAGNOSIS — E11.9 DIABETES (HCC): ICD-10-CM

## 2021-04-30 DIAGNOSIS — E87.6 HYPOKALEMIA: ICD-10-CM

## 2021-04-30 DIAGNOSIS — R10.9 ABDOMINAL PAIN, ACUTE: ICD-10-CM

## 2021-04-30 PROCEDURE — 99222 1ST HOSP IP/OBS MODERATE 55: CPT | Performed by: HOSPITALIST

## 2021-04-30 PROCEDURE — 74176 CT ABD & PELVIS W/O CONTRAST: CPT | Performed by: EMERGENCY MEDICINE

## 2021-04-30 RX ORDER — PANTOPRAZOLE SODIUM 20 MG/1
20 TABLET, DELAYED RELEASE ORAL
Status: DISCONTINUED | OUTPATIENT
Start: 2021-05-01 | End: 2021-05-02

## 2021-04-30 RX ORDER — ACETAMINOPHEN 500 MG
1000 TABLET ORAL ONCE
Status: DISCONTINUED | OUTPATIENT
Start: 2021-04-30 | End: 2021-05-02

## 2021-04-30 RX ORDER — SODIUM CHLORIDE 9 MG/ML
INJECTION, SOLUTION INTRAVENOUS CONTINUOUS
Status: ACTIVE | OUTPATIENT
Start: 2021-04-30 | End: 2021-04-30

## 2021-04-30 RX ORDER — HEPARIN SODIUM 5000 [USP'U]/ML
5000 INJECTION, SOLUTION INTRAVENOUS; SUBCUTANEOUS EVERY 12 HOURS SCHEDULED
Status: DISCONTINUED | OUTPATIENT
Start: 2021-04-30 | End: 2021-05-02

## 2021-04-30 RX ORDER — MORPHINE SULFATE 2 MG/ML
2 INJECTION, SOLUTION INTRAMUSCULAR; INTRAVENOUS EVERY 2 HOUR PRN
Status: DISCONTINUED | OUTPATIENT
Start: 2021-04-30 | End: 2021-05-02

## 2021-04-30 RX ORDER — LISINOPRIL 10 MG/1
10 TABLET ORAL DAILY
Status: DISCONTINUED | OUTPATIENT
Start: 2021-05-01 | End: 2021-05-02

## 2021-04-30 RX ORDER — POLYETHYLENE GLYCOL 3350 17 G/17G
17 POWDER, FOR SOLUTION ORAL DAILY
Status: DISCONTINUED | OUTPATIENT
Start: 2021-05-01 | End: 2021-05-02

## 2021-04-30 RX ORDER — ONDANSETRON 2 MG/ML
4 INJECTION INTRAMUSCULAR; INTRAVENOUS EVERY 6 HOURS PRN
Status: DISCONTINUED | OUTPATIENT
Start: 2021-04-30 | End: 2021-05-02

## 2021-04-30 RX ORDER — TEMAZEPAM 7.5 MG/1
15 CAPSULE ORAL NIGHTLY PRN
Status: DISCONTINUED | OUTPATIENT
Start: 2021-04-30 | End: 2021-05-02

## 2021-04-30 RX ORDER — METOCLOPRAMIDE HYDROCHLORIDE 5 MG/ML
10 INJECTION INTRAMUSCULAR; INTRAVENOUS ONCE
Status: COMPLETED | OUTPATIENT
Start: 2021-04-30 | End: 2021-04-30

## 2021-04-30 RX ORDER — DOCUSATE SODIUM 100 MG/1
100 CAPSULE, LIQUID FILLED ORAL 2 TIMES DAILY PRN
Status: DISCONTINUED | OUTPATIENT
Start: 2021-04-30 | End: 2021-05-02

## 2021-04-30 RX ORDER — MORPHINE SULFATE 2 MG/ML
1 INJECTION, SOLUTION INTRAMUSCULAR; INTRAVENOUS EVERY 2 HOUR PRN
Status: DISCONTINUED | OUTPATIENT
Start: 2021-04-30 | End: 2021-05-02

## 2021-04-30 RX ORDER — ONDANSETRON 4 MG/1
4 TABLET, FILM COATED ORAL EVERY 8 HOURS PRN
Status: DISCONTINUED | OUTPATIENT
Start: 2021-04-30 | End: 2021-05-02

## 2021-04-30 RX ORDER — ACETAMINOPHEN 325 MG/1
650 TABLET ORAL EVERY 6 HOURS PRN
Status: DISCONTINUED | OUTPATIENT
Start: 2021-04-30 | End: 2021-05-02

## 2021-04-30 RX ORDER — DULOXETIN HYDROCHLORIDE 60 MG/1
60 CAPSULE, DELAYED RELEASE ORAL DAILY
Status: DISCONTINUED | OUTPATIENT
Start: 2021-04-30 | End: 2021-05-02

## 2021-04-30 RX ORDER — OXYCODONE HYDROCHLORIDE AND ACETAMINOPHEN 5; 325 MG/1; MG/1
1-2 TABLET ORAL EVERY 6 HOURS PRN
Status: DISCONTINUED | OUTPATIENT
Start: 2021-04-30 | End: 2021-04-30 | Stop reason: WASHOUT

## 2021-04-30 RX ORDER — DILTIAZEM HYDROCHLORIDE 180 MG/1
360 CAPSULE, EXTENDED RELEASE ORAL DAILY
Status: DISCONTINUED | OUTPATIENT
Start: 2021-04-30 | End: 2021-04-30

## 2021-04-30 RX ORDER — DULOXETIN HYDROCHLORIDE 60 MG/1
60 CAPSULE, DELAYED RELEASE ORAL DAILY
COMMUNITY

## 2021-04-30 RX ORDER — SODIUM CHLORIDE 9 MG/ML
INJECTION, SOLUTION INTRAVENOUS CONTINUOUS
Status: DISCONTINUED | OUTPATIENT
Start: 2021-04-30 | End: 2021-04-30

## 2021-04-30 RX ORDER — MORPHINE SULFATE 4 MG/ML
4 INJECTION, SOLUTION INTRAMUSCULAR; INTRAVENOUS EVERY 2 HOUR PRN
Status: DISCONTINUED | OUTPATIENT
Start: 2021-04-30 | End: 2021-05-02

## 2021-04-30 RX ORDER — MORPHINE SULFATE 4 MG/ML
4 INJECTION, SOLUTION INTRAMUSCULAR; INTRAVENOUS ONCE
Status: COMPLETED | OUTPATIENT
Start: 2021-04-30 | End: 2021-04-30

## 2021-04-30 RX ORDER — LETROZOLE 2.5 MG/1
2.5 TABLET, FILM COATED ORAL DAILY
Status: DISCONTINUED | OUTPATIENT
Start: 2021-05-01 | End: 2021-05-02

## 2021-04-30 RX ORDER — DEXTROSE MONOHYDRATE 25 G/50ML
50 INJECTION, SOLUTION INTRAVENOUS
Status: DISCONTINUED | OUTPATIENT
Start: 2021-04-30 | End: 2021-05-02

## 2021-04-30 RX ORDER — ONDANSETRON 2 MG/ML
4 INJECTION INTRAMUSCULAR; INTRAVENOUS ONCE
Status: COMPLETED | OUTPATIENT
Start: 2021-04-30 | End: 2021-04-30

## 2021-04-30 NOTE — H&P
[facility]    PATIENT'S NAME: BRI HERRERA   ATTENDING PHYSICIAN: Benito Paez MD   PATIENT ACCOUNT#:   [id number]    LOCATION:  [address] 1  MEDICAL RECORD #:   [id number]       YOB: 1965  ADMISSION DATE: activities of daily living. REVIEW OF SYSTEMS:  She does have chronic constipation and irritable bowel syndrome; also intestinal dysmotility from her diabetes.   The patient said for the last 2 days she has been having difficulty with her decreased appet

## 2021-04-30 NOTE — ED PROVIDER NOTES
Patient Seen in: [facility] AND [facility] Emergency Department      History   Patient presents with:  Abdomen/Flank Pain    Stated Complaint: abd pain    HPI/Subjective:   HPI    51-year-old female with past medical history significant for CMV hepatitis, breast 2013    hip pinning for stress fracture,right   • FELISHA LOCALIZATION WIRE 1 SITE RIGHT (CPT=19281)      1994   • MASTECTOMY LEFT     • MASTECTOMY RIGHT     • SPLENECTOMY  1984                Social History    Tobacco Use      Smoking status: Never Smoker components within normal limits   URINALYSIS WITH CULTURE REFLEX - Abnormal; Notable for the following components:    Blood Urine Small (*)     Squamous Epi.  Cells Few (*)     All other components within normal limits   HEPATIC FUNCTION PANEL (7) - Abnorma and appendectomy changes. 3. Relative low attenuation of the liver, probable hepatic steatosis. 4. Severe fatty atrophy of the pancreas, similar to prior. 5. Lesser incidental findings as above.      Dictated by (CST): Fletcher Harvey MD on 4/30/2021 at 11

## 2021-04-30 NOTE — ED INITIAL ASSESSMENT (HPI)
Patient complains of abd pain since yesterday, denies vomiting/diarrhea, states she is nauseous but is \"always nauseous\"

## 2021-04-30 NOTE — ED QUICK NOTES
.Orders for admission, patient is aware of plan and ready to go upstairs. Any questions, please call ED RN Josue Merrill at extension [phone].    Type of COVID test sent: rapid  COVID Suspicion level: Low  Titratable drug(s) infusing:  Rate:  LOC at time of transport

## 2021-04-30 NOTE — CONSULTS
Gastroenterology consultation note    Reason for consultation:  Severe abdominal pain, leukocytosis, hypokalemia      History of present illness:   The patient is a 54year old female with a complex past medical history including stage IIIb Hodgkin's lympho pain is 4-5/10 after morphine a few hours prior.         Past Medical History:   Diagnosis Date   • Asplenia after surgical procedure 1984   • Breast CA ([facility] 2019    right breast-bilat mastectomy   • CMV hepatitis ([facility] 1997   • Congestive heart disease (H Asked        Hobby Hazards: Not Asked        Sleep Concern: Not Asked        Stress Concern: Not Asked        Weight Concern: Not Asked        Special Diet: Not Asked        Back Care: Not Asked        Exercise: Not Asked        Bike Helmet: Not Asked Self         hodgkins 1994   • Diabetes Neg        A comprehensive 10 point review of systems was completed. Pertinent positives and negatives noted in the the HPI.         Physical examination:  GEN:  Pleasant well-developed well-nourished female who is i Jeromy Velazquez MD on 4/30/2021 at 12:15 PM            Assessment and Plan:    Impression:  Abdominal pain, leukocytosis, hypokalemia:  Sha Toure presents with crampy abdominal pain that began yesterday and increased in intensity.   There has been relative cons

## 2021-05-01 PROCEDURE — 99232 SBSQ HOSP IP/OBS MODERATE 35: CPT | Performed by: INTERNAL MEDICINE

## 2021-05-01 PROCEDURE — 99233 SBSQ HOSP IP/OBS HIGH 50: CPT | Performed by: INTERNAL MEDICINE

## 2021-05-01 RX ORDER — SODIUM PHOSPHATE, DIBASIC AND SODIUM PHOSPHATE, MONOBASIC 7; 19 G/133ML; G/133ML
1 ENEMA RECTAL ONCE AS NEEDED
Status: ACTIVE | OUTPATIENT
Start: 2021-05-01 | End: 2021-05-01

## 2021-05-01 RX ORDER — SENNOSIDES 8.6 MG
8.6 TABLET ORAL 2 TIMES DAILY
Status: DISCONTINUED | OUTPATIENT
Start: 2021-05-01 | End: 2021-05-02

## 2021-05-01 RX ORDER — MAGNESIUM CARB/ALUMINUM HYDROX 105-160MG
296 TABLET,CHEWABLE ORAL ONCE
Status: COMPLETED | OUTPATIENT
Start: 2021-05-01 | End: 2021-05-01

## 2021-05-01 RX ORDER — POLYETHYLENE GLYCOL 3350 17 G/17G
17 POWDER, FOR SOLUTION ORAL ONCE
Status: COMPLETED | OUTPATIENT
Start: 2021-05-01 | End: 2021-05-01

## 2021-05-01 NOTE — PLAN OF CARE
Problem: Patient Centered Care  Goal: Patient preferences are identified and integrated in the patient's plan of care  Description: Interventions:  - What would you like us to know as we care for you?  I have type one diabetes  - Provide timely, complete, limitations  - Instruct pt to call for assistance with activity based on assessment  - Modify environment to reduce risk of injury  - Provide assistive devices as appropriate  - Consider OT/PT consult to assist with strengthening/mobility  - Encourage toil

## 2021-05-01 NOTE — CONSULTS
[facility]    Report of Endocrinology Consultation  [facility]    Josesito Arizmendi Patient Status:  Inpatient    1965 MRN [id number]   Location [facility] 5SW/SE Attending Jono Macdonald, [address] [facility] Procedure Laterality Date   • APPENDECTOMY  1992   • APPENDECTOMY     • CHEMOTHERAPY  1984    Hodgkin;s disease-3B   • COLONOSCOPY  3/21/2014   • COLONOSCOPY N/A 10/19/2019    Procedure: COLONOSCOPY;  Surgeon: Bhupendra Gonzalez MD;  Location: [facility] sulfate (PF) 4 MG/ML injection 4 mg, 4 mg, Intravenous, Q2H PRN  •  ondansetron HCl (ZOFRAN) injection 4 mg, 4 mg, Intravenous, Q6H PRN  •  temazepam (RESTORIL) cap 15 mg, 15 mg, Oral, Nightly PRN  •  acetaminophen (TYLENOL EXTRA STRENGTH) tab 1,000 mg, 1, apparent distress. HEENT:  EOMI, thyroid non-enlarged   Neck: Supple. Lungs: Non-labored respirations  Cardiac: Regular rate and rhythm. Abdomen: Bowel sounds present, normoactive. Nontender.   Extremities:  No lower extremity edema noted, DP +2 b/l, s

## 2021-05-01 NOTE — PROGRESS NOTES
[facility]    Progress Note    Maryjane Carty Patient Status:  Inpatient    1965 MRN [id number]   Location [facility] 5SW/SE Attending Ingrid Chang MD   [facility] Day # 1 PCP Kaila Aponte MD     CC: Abdominal danii • letrozole  2.5 mg Oral Daily   • lisinopril  10 mg Oral Daily   • Pantoprazole Sodium  20 mg Oral QAM AC   • insulin detemir  6 Units Subcutaneous Nightly   • Insulin Aspart Pen  3 Units Subcutaneous TID CC   • Insulin Aspart Pen  1-5 Units Subcutaneou liver, probable hepatic steatosis. 4. Severe fatty atrophy of the pancreas, similar to prior. 5. Lesser incidental findings as above.      Dictated by (CST): Rao Etienne MD on 4/30/2021 at 11:59 AM     Finalized by (CST): Rao Etienne MD on 4/30/2021 a

## 2021-05-01 NOTE — PLAN OF CARE
Abdominal pain increasing with eating. Sm emesis, no BM miralax x 2, pt ambulating in room. Electrolytes normal today.   Problem: Patient Centered Care  Goal: Patient preferences are identified and integrated in the patient's plan of care  Description: Int [place] fall precautions as indicated by assessment.  - Educate pt/family on patient safety including physical limitations  - Instruct pt to call for assistance with activity based on assessment  - Modify environment to reduce risk of injury  - Provide a

## 2021-05-01 NOTE — PROGRESS NOTES
[facility]     Gastroenterology Progress Note    Steffany Juarezelor Patient Status:  Inpatient    1965 MRN [id number]   Location [facility] 5SW/SE Attending Tracey Cabrales, [address] [facility] Day # 1 PCP Ning Lockett 706.0* 600.0*         Recent Labs   Lab 04/30/21  1048 05/01/21  0607   * 84   BUN 9 10   CREATSERUM 0.96 0.81   GFRAA 77 95   GFRNAA 67 82   CA 9.6 8.9   ALB 3.8  --    * 137   K 2.9* 3.8   CL 88* 99   CO2 32.0 33.0*   ALKPHO 124*  --    AST

## 2021-05-02 ENCOUNTER — TELEPHONE (OUTPATIENT)
Dept: GASTROENTEROLOGY | Facility: CLINIC | Age: 56
End: 2021-05-02

## 2021-05-02 VITALS
TEMPERATURE: 98 F | DIASTOLIC BLOOD PRESSURE: 68 MMHG | BODY MASS INDEX: 21 KG/M2 | HEART RATE: 69 BPM | SYSTOLIC BLOOD PRESSURE: 109 MMHG | WEIGHT: 134 LBS | OXYGEN SATURATION: 100 % | RESPIRATION RATE: 16 BRPM

## 2021-05-02 PROCEDURE — 99232 SBSQ HOSP IP/OBS MODERATE 35: CPT | Performed by: INTERNAL MEDICINE

## 2021-05-02 PROCEDURE — 99239 HOSP IP/OBS DSCHRG MGMT >30: CPT | Performed by: INTERNAL MEDICINE

## 2021-05-02 RX ORDER — SENNA PLUS 8.6 MG/1
8.6 TABLET ORAL 2 TIMES DAILY PRN
Qty: 30 TABLET | Refills: 0 | Status: SHIPPED | OUTPATIENT
Start: 2021-05-02 | End: 2022-01-26

## 2021-05-02 RX ORDER — FAMOTIDINE 20 MG/1
20 TABLET ORAL DAILY
Status: DISCONTINUED | OUTPATIENT
Start: 2021-05-02 | End: 2021-05-02

## 2021-05-02 RX ORDER — DILTIAZEM HYDROCHLORIDE 180 MG/1
180 CAPSULE, COATED, EXTENDED RELEASE ORAL DAILY
Qty: 60 CAPSULE | Refills: 3 | Status: SHIPPED | OUTPATIENT
Start: 2021-05-02

## 2021-05-02 RX ORDER — SODIUM PHOSPHATE, DIBASIC AND SODIUM PHOSPHATE, MONOBASIC 7; 19 G/133ML; G/133ML
1 ENEMA RECTAL ONCE AS NEEDED
Status: COMPLETED | OUTPATIENT
Start: 2021-05-02 | End: 2021-05-02

## 2021-05-02 NOTE — DISCHARGE SUMMARY
[facility]    Discharge Summary    [address] Patient Status:  Inpatient    1965 MRN [id number]   Location [facility] 5SW/SE Attending Joseph Dia MD   Hosp Day # 2 PCP Melyssa Brunner MD     Date of Admis 98 °F (36.7 °C)   TempSrc: Oral Oral  Oral   SpO2: 99% 100%  100%   Weight:         Patient Weight for the past 72 hrs:   Weight   04/30/21 1033 134 lb (60.8 kg)       Intake/Output Summary (Last 24 hours) at 5/2/2021 1355  Last data filed at 5/2/2021 0327 04/30/2021    AST 20 04/30/2021    ALT 21 04/30/2021    INR 1.0 10/24/2014    PT 13.2 10/24/2014    TSH 0.384 04/08/2019    LIP 94 04/30/2021    ESRML 19 10/31/2019    CRP 0.69 (H) 04/07/2020    MG 2.2 04/30/2021    PHOS 3.7 03/05/2020    TROP <0.045 04/08 times daily as needed for constipation. Quantity: 20 capsule  Refills: 1     DULoxetine HCl 60 MG Cpep  Commonly known as: CYMBALTA      Take 60 mg by mouth daily.    Refills: 0     Fiasp FlexTouch 100 UNIT/ML Sopn  Generic drug: Insulin Aspart (w/Niacina ----------------------------------------------------  >30 MIN SPENT ON Yee Medina MD    5/2/2021  1:37 PM

## 2021-05-02 NOTE — PLAN OF CARE
Problem: Patient Centered Care  Goal: Patient preferences are identified and integrated in the patient's plan of care  Description: Interventions:  - What would you like us to know as we care for you?  I have type one diabetes  - Provide timely, complete, post-discharge preferences of patient/family/discharge partner  - Complete POLST form as appropriate  - Assess patient's ability to be responsible for managing their own health  - Refer to Case Management Department for coordinating discharge planning if t

## 2021-05-02 NOTE — PROGRESS NOTES
[facility]    Endocrine Progress Note  [facility]    Carmen Harp [facility] Patient Status:  Inpatient    1965 MRN [id number]   Location [facility] 5SW/SE Attending Ingrid Chang MD   [facility] Day # 2 PCP Berhane Bun mg, Oral, BID PRN  •  DULoxetine HCl (CYMBALTA) DR particles cap 60 mg, 60 mg, Oral, Daily  •  letrozole [facility]) tab 2.5 mg, 2.5 mg, Oral, Daily  •  lisinopril tab 10 mg, 10 mg, Oral, Daily  •  Pantoprazole Sodium (PROTONIX) EC tab 20 mg, 20 mg, Oral, QAM TROP <0.045 04/08/2019     04/06/2020       CT ABDOMEN+PELVIS(CPT=74176)    Result Date: 4/30/2021  CONCLUSION:   1. Large amount of stool throughout the colon suggests constipation. No evidence for bowel obstruction as clinically questioned.   2.

## 2021-05-02 NOTE — PROGRESS NOTES
[facility]    Progress Note    Cyrilla Boast Patient Status:  Inpatient    1965 MRN [id number]   Location [facility] 5SW/SE Attending Remi Nicole MD   Hosp Day # 2 PCP Elver Caldwell MD     CC: Abdominal danii HCl  60 mg Oral Daily   • letrozole  2.5 mg Oral Daily   • lisinopril  10 mg Oral Daily   • Pantoprazole Sodium  20 mg Oral QAM AC   • insulin detemir  6 Units Subcutaneous Nightly   • Insulin Aspart Pen  3 Units Subcutaneous TID CC   • Insulin Aspart Pen low attenuation of the liver, probable hepatic steatosis. 4. Severe fatty atrophy of the pancreas, similar to prior. 5. Lesser incidental findings as above.      Dictated by (CST): Sawyer Huynh MD on 4/30/2021 at 11:59 AM     Finalized by (CST): Emily Schmidt corrected.  While every attempt is made to correct errors during dictation discrepancies may still exist

## 2021-05-02 NOTE — PROGRESS NOTES
[facility]     Gastroenterology Progress Note    Arin Ahmadi Patient Status:  Inpatient    1965 MRN [id number]   Location [facility] 5SW/SE Attending Elizabeth Haddad, [address] [facility] Day # 2 PCP Sukumar Keller 706.0* 600.0*         Recent Labs   Lab 04/30/21  1048 05/01/21  0607   * 84   BUN 9 10   CREATSERUM 0.96 0.81   GFRAA 77 95   GFRNAA 67 82   CA 9.6 8.9   ALB 3.8  --    * 137   K 2.9* 3.8   CL 88* 99   CO2 32.0 33.0*   ALKPHO 124*  --    AST

## 2021-05-03 ENCOUNTER — APPOINTMENT (OUTPATIENT)
Dept: HEMATOLOGY/ONCOLOGY | Facility: HOSPITAL | Age: 56
End: 2021-05-03
Attending: INTERNAL MEDICINE
Payer: COMMERCIAL

## 2021-05-03 NOTE — TELEPHONE ENCOUNTER
Dr. Rafael Toro-    How soon of an apt does patient need for f/u? Post hospital admission follow up appointment for constipation & abdominal pain.

## 2021-05-04 ENCOUNTER — TELEPHONE (OUTPATIENT)
Dept: GASTROENTEROLOGY | Facility: CLINIC | Age: 56
End: 2021-05-04

## 2021-05-04 NOTE — PAYOR COMM NOTE
--------------  DISCHARGE REVIEW    Payor: [employer]  Subscriber #:  [id number]  Authorization Number: [id number]    Admit date: 4/30/21  Admit time:   3:38 PM  Discharge Date: 5/2/2021  2:40 PM     Admitting Physician: Joanna Murrell MD  Attending Phys pain.  Of note, patient was also found to have hyponatremia and hypokalemia which were attributed to poor p.o. intake from her abdominal pain. Her electrolytes corrected with IV fluids and potassium replacement.   Patient was cleared for discharge by GI wi steatosis. 4. Severe fatty atrophy of the pancreas, similar to prior. 5. Lesser incidental findings as above.      Dictated by (CST): Lexis Rutledge MD on 4/30/2021 at 11:59 AM     Finalized by (CST): Lexis Rutledge MD on 4/30/2021 at 12:15 PM            [place] needed (Constipation).    Quantity: 30 tablet  Refills: 0        CHANGE how you take these medications      Instructions Prescription details   dilTIAZem HCl ER Coated Beads 180 MG Cp24  Commonly known as: Marlen VAZQUEZ  What changed:   · how much to take  · wh Get Your Medications      These medications were sent to [facility] [address], [phone]  [facility], [address] [place] [address]    Phone: [phone]   · dilTIAZem HCl ER Coated Beads 180 MG Cp24  · sennosides 8.6 MG T

## 2021-05-04 NOTE — PAYOR COMM NOTE
--------------  ADMISSION REVIEW     Payor: [employer]  Subscriber #:  [id number]  Authorization Number: [id number]    Admit date: 4/30/21  Admit time:  3:38 PM  DISCHARGED 5/2       Admitting Physician: Rodrigo Moon MD  Attending Physician:  Noa att.  p sepsis R/T pneumococcal bacteria    • Serratia 2005   • Stress fracture of hip 2013    hip pinning   • Type 1 diabetes mellitus (HCC)     Type 1-C   • Visual impairment     WEARS GLASSES/CONTACT     Review of Systems    Positive for stated complaint: abd p other components within normal limits   HEPATIC FUNCTION PANEL (7) - Abnormal; Notable for the following components:    Alkaline Phosphatase 124 (*)     Total Protein 8.3 (*)     All other components within normal limits   IRON AND TIBC - Abnormal; Notable findings as above. Dictated by (CST): Errol Moya MD on 4/30/2021 at 11:59 AM     Finalized by (CST): Errol Moya MD on 4/30/2021 at 12:15 PM                  MDM      Patient has hyponatremia, hypokalemia.   She appears to have constipation on CT t ADMISSION DATE:       04/30/2021    HISTORY AND PHYSICAL EXAMINATION    CHIEF COMPLAINT:  Constipation, hypokalemia, and hyponatremia.     HISTORY OF PRESENT ILLNESS:  The patient is a 66-year-old  female who came into the emergency department wit Supple. No lymphadenopathy. Trachea midline. Full range of motion. LUNGS:  Clear to auscultation bilaterally. Normal respiratory effort. HEART:  Regular rate and rhythm. S1 and S2 auscultated. No murmur. ABDOMEN:  Soft. Hypoactive bowel sounds. chronic pancreatitis with exocrine pancreatic insufficiency and type 1 diabetes mellitus.   In 2019 she was diagnosed with right breast cancer and underwent bilateral mastectomy and breast reconstruction.     The patient has undergone endoscopic evaluation icterus. Conjunctivae are pink. Neck: Supple without adenopathy or thyromegaly  Chest: Clear to auscultation and percussion  Cardiovascular: Regular S1 and S2  Abdomen: Nondistended. Surgical scars. Bowel sounds are present and nonobstructive.   There i chronic thrombocytosis, hypokalemia and hyponatremia. The latter electrolyte abnormalities are likely due to the thiazide diuretic. Adrenal insufficiency would be an additional consideration.   The patient's physical examination and CT findings are not co eunice CP, SOB,  F/C        Review of Systems:   10 systems reviewed, negative unless otherwise stated above     Objective:        04/30/21  1242 04/30/21  1554 04/30/21  2100 05/01/21  0636   BP: 118/73 116/75 112/72 115/75   BP Location:   Right arm Righ glucose **OR** Glucose-Vitamin C     Results:           Recent Labs   Lab 04/30/21  1048 05/01/21  0607   RBC 4.26 3.53*   HGB 11.4* 9.5*   HCT 34.5* 29.5*   MCV 81.0 83.6   MCH 26.8 26.9   MCHC 33.0 32.2   RDW 16.0* 16.3*   NEPRELIM 5.87 2.82   WBC 11.7* (H) 04/06/2020     CRP 1.38 (H) 10/31/2019     PCT 0.15 03/03/2020     PCT <0.02 04/08/2019         Assessment and Plan:     Hyponatremia  -Resolved  -Possibly related to poor p.o. intake  -Continue to monitor       Hypokalemia  -Improved  -Replete as need Follow-Up Recommendation:  LACE > 58:  High Risk of readmission after discharge from the hospital.     DISCHARGE DX: Principal Problem:    Hyponatremia  Active Problems:    Hypokalemia    Constipation, unspecified constipation type    Abdominal pain, acute alert, in no acute distress. HEENT: Normocephalic. Extraocular muscles were intact. PERRL. NECK:  Supple. Trach midline  CHEST:  Symmetrical movement on inspiration  HEART:  S1 and S2 heard. RRR   LUNGS:  Air entry was good.   No crackles or wheezes          Recent Labs   Lab 04/30/21  1048 05/01/21  0607 05/02/21  0910   RBC 4.26 3.53* 4.21   HGB 11.4* 9.5* 11.2*   HCT 34.5* 29.5* 34.9*   MCV 81.0 83.6 82.9   MCH 26.8 26.9 26.6   MCHC 33.0 32.2 32.1   RDW 16.0* 16.3* 16.6*   NEPRELIM 5.87 2.82 3.41 Generic drug: Insulin Aspart (w/Niacinamide)       Inject into the skin 3 (three) times daily before meals.  Sliding scale based on carb intake at meals    Refills: 0      Fiasp 100 UNIT/ML Soln  Generic drug: Insulin Aspart (w/Niacinamide)       by Insulin 5/2/2021  2:00 PM

## 2021-05-04 NOTE — TELEPHONE ENCOUNTER
I left a message on Scar's personal voicemail that I called to see how she was feeling. I will contact the patient tomorrow or she can contact me later tonight as well.

## 2021-05-05 RX ORDER — METOCLOPRAMIDE HYDROCHLORIDE 5 MG/5ML
5 SOLUTION ORAL
Qty: 240 ML | Refills: 0 | Status: SHIPPED | OUTPATIENT
Start: 2021-05-05

## 2021-05-05 NOTE — TELEPHONE ENCOUNTER
I spoke to [facility]. She continues to experience nausea. She will regurgitate at night with pyrosis. She is taking 20 mg daily of Prilosec. She is having bowel movements that are frequent.   Taking pancreatic enzyme supplementation results in constipatio

## 2021-05-05 NOTE — TELEPHONE ENCOUNTER
LMTCB.     Phone room-    If patient calls back, please inquire if patient can come into office this week to see Dr Lazara Lucero for f/u apt tomorrow at 9:45 AM.    Thank you

## 2021-05-06 NOTE — TELEPHONE ENCOUNTER
Dr. Vicky Marie-    I saw you discussed plan of care with patient on 5/5/21. Do we still want her to follow up in office ASAP, or just await update from her after Metoclopramide 5 mg BID?

## 2021-05-10 ENCOUNTER — APPOINTMENT (OUTPATIENT)
Dept: HEMATOLOGY/ONCOLOGY | Facility: HOSPITAL | Age: 56
End: 2021-05-10
Attending: INTERNAL MEDICINE
Payer: COMMERCIAL

## 2021-05-10 ENCOUNTER — TELEPHONE (OUTPATIENT)
Dept: HEMATOLOGY/ONCOLOGY | Facility: HOSPITAL | Age: 56
End: 2021-05-10

## 2021-05-10 NOTE — TELEPHONE ENCOUNTER
Santiago Fan  Asim Aguilar called to cx today's appt is there a possibility to get Sabrina Mega in [facility] [address] or [facility] in the afternoon.  paulino

## 2021-05-21 ENCOUNTER — OFFICE VISIT (OUTPATIENT)
Dept: HEMATOLOGY/ONCOLOGY | Facility: HOSPITAL | Age: 56
End: 2021-05-21
Attending: INTERNAL MEDICINE
Payer: COMMERCIAL

## 2021-05-21 VITALS
RESPIRATION RATE: 16 BRPM | OXYGEN SATURATION: 98 % | SYSTOLIC BLOOD PRESSURE: 123 MMHG | DIASTOLIC BLOOD PRESSURE: 78 MMHG | HEART RATE: 73 BPM | TEMPERATURE: 99 F | BODY MASS INDEX: 22.6 KG/M2 | WEIGHT: 144 LBS | HEIGHT: 67 IN

## 2021-05-21 DIAGNOSIS — Z17.0 MALIGNANT NEOPLASM OF UPPER-OUTER QUADRANT OF RIGHT BREAST IN FEMALE, ESTROGEN RECEPTOR POSITIVE (HCC): Primary | ICD-10-CM

## 2021-05-21 DIAGNOSIS — C50.411 MALIGNANT NEOPLASM OF UPPER-OUTER QUADRANT OF RIGHT BREAST IN FEMALE, ESTROGEN RECEPTOR POSITIVE (HCC): Primary | ICD-10-CM

## 2021-05-21 DIAGNOSIS — Z79.811 ENCOUNTER FOR MONITORING AROMATASE INHIBITOR THERAPY: ICD-10-CM

## 2021-05-21 DIAGNOSIS — Z78.0 POST-MENOPAUSAL: ICD-10-CM

## 2021-05-21 DIAGNOSIS — Z51.81 ENCOUNTER FOR MONITORING AROMATASE INHIBITOR THERAPY: ICD-10-CM

## 2021-05-21 PROCEDURE — 99214 OFFICE O/P EST MOD 30 MIN: CPT | Performed by: INTERNAL MEDICINE

## 2021-05-21 RX ORDER — LETROZOLE 2.5 MG/1
2.5 TABLET, FILM COATED ORAL DAILY
Qty: 90 TABLET | Refills: 3 | Status: SHIPPED | OUTPATIENT
Start: 2021-05-21

## 2021-05-21 NOTE — PROGRESS NOTES
HPI   Mahsa Duron is a 54year old female who is here today for f/u diagnosis of  Malignant neoplasm of upper-outer quadrant of right breast in female, estrogen receptor positive (hcc)  (primary encounter diagnosis)  Encounter for monitoring total) by mouth 2 (two) times daily before meals. (Patient not taking: Reported on 5/21/2021 ) 240 mL 0   • dilTIAZem HCl ER Coated Beads (CARTIA XT) 180 MG Oral Capsule SR 24 Hr Take 1 capsule (180 mg total) by mouth daily.  (Patient not taking: Reported o blood pressure    • [facility] 1984    3B-chemo   • Neuropathy 1984   • Pancreatic insufficiency    • Personal history of antineoplastic chemotherapy 1984   • Pneumococcal sepsis ([facility] 1994    sepsis R/T pneumococcal bacteria    • Serratia 200 Encounters:  05/21/21 : 65.3 kg (144 lb)  04/30/21 : 60.8 kg (134 lb)  12/28/20 : 63.8 kg (140 lb 10.5 oz)  08/06/20 : 61.4 kg (135 lb 4.8 oz)  07/18/20 : 59 kg (130 lb)  06/18/20 : 59.9 kg (132 lb)    Physical Exam  General: Patient is alert, not in acute progressive daily aerobic exercise program, follow a low fat, low cholesterol diet, attempt to loose weight, decrease or avoid alcohol intake, have regular screening exams as age/gender appropriate, reduce salt in the diet and cooking, reduce exposure to s

## 2021-06-15 ENCOUNTER — MOBILE ENCOUNTER (OUTPATIENT)
Dept: ANESTHESIOLOGY | Facility: HOSPITAL | Age: 56
End: 2021-06-15

## 2021-06-15 DIAGNOSIS — E13.9 DIABETES 1.5, MANAGED AS TYPE 1 (HCC): Primary | ICD-10-CM

## 2021-06-28 ENCOUNTER — MOBILE ENCOUNTER (OUTPATIENT)
Dept: ANESTHESIOLOGY | Facility: HOSPITAL | Age: 56
End: 2021-06-28

## 2021-06-28 DIAGNOSIS — E13.9 DIABETES 1.5, MANAGED AS TYPE 1 (HCC): Primary | ICD-10-CM

## 2021-06-29 ENCOUNTER — LAB ENCOUNTER (OUTPATIENT)
Dept: LAB | Facility: HOSPITAL | Age: 56
End: 2021-06-29
Attending: ANESTHESIOLOGY
Payer: COMMERCIAL

## 2021-06-29 DIAGNOSIS — E83.52 HYPERCALCEMIA: Primary | ICD-10-CM

## 2021-06-29 DIAGNOSIS — E13.9 DIABETES 1.5, MANAGED AS TYPE 1 (HCC): ICD-10-CM

## 2021-06-29 DIAGNOSIS — E83.52 HYPERCALCEMIA: ICD-10-CM

## 2021-06-29 LAB
ALBUMIN SERPL-MCNC: 4.1 G/DL (ref 3.4–5)
ALP LIVER SERPL-CCNC: 142 U/L
ALT SERPL-CCNC: 28 U/L
ANION GAP SERPL CALC-SCNC: 9 MMOL/L (ref 0–18)
AST SERPL-CCNC: 30 U/L (ref 15–37)
BILIRUB DIRECT SERPL-MCNC: <0.1 MG/DL (ref 0–0.2)
BILIRUB SERPL-MCNC: 0.4 MG/DL (ref 0.1–2)
BUN BLD-MCNC: 10 MG/DL (ref 7–18)
BUN/CREAT SERPL: 13.3 (ref 10–20)
CALCIUM BLD-MCNC: 10.2 MG/DL (ref 8.5–10.1)
CHLORIDE SERPL-SCNC: 101 MMOL/L (ref 98–112)
CO2 SERPL-SCNC: 24 MMOL/L (ref 21–32)
CREAT BLD-MCNC: 0.75 MG/DL
EST. AVERAGE GLUCOSE BLD GHB EST-MCNC: 120 MG/DL (ref 68–126)
GLUCOSE BLD-MCNC: 106 MG/DL (ref 70–99)
HBA1C MFR BLD HPLC: 5.8 % (ref ?–5.7)
M PROTEIN MFR SERPL ELPH: 9.1 G/DL (ref 6.4–8.2)
OSMOLALITY SERPL CALC.SUM OF ELEC: 277 MOSM/KG (ref 275–295)
PATIENT FASTING Y/N/NP: YES
POTASSIUM SERPL-SCNC: 3.8 MMOL/L (ref 3.5–5.1)
SODIUM SERPL-SCNC: 134 MMOL/L (ref 136–145)

## 2021-06-29 PROCEDURE — 80048 BASIC METABOLIC PNL TOTAL CA: CPT

## 2021-06-29 PROCEDURE — 83036 HEMOGLOBIN GLYCOSYLATED A1C: CPT

## 2021-06-29 PROCEDURE — 36415 COLL VENOUS BLD VENIPUNCTURE: CPT

## 2021-06-29 PROCEDURE — 80076 HEPATIC FUNCTION PANEL: CPT

## 2021-06-29 NOTE — PROGRESS NOTES
Patient's  called due to patient's calcium being elevated at 10.2. Patient not taking calcium supplements but very busy with work and likely not hydrating enough. LFT's with albumin added.   Her Alk Phos usually is high but will see if still in her

## 2021-07-07 ENCOUNTER — LAB ENCOUNTER (OUTPATIENT)
Dept: LAB | Facility: HOSPITAL | Age: 56
End: 2021-07-07
Attending: INTERNAL MEDICINE
Payer: COMMERCIAL

## 2021-07-07 DIAGNOSIS — E11.9 DIABETES (HCC): ICD-10-CM

## 2021-07-07 DIAGNOSIS — E83.52 HYPERCALCEMIA: ICD-10-CM

## 2021-07-07 DIAGNOSIS — E13.9 DIABETES 1.5, MANAGED AS TYPE 1 (HCC): ICD-10-CM

## 2021-07-07 LAB
ALBUMIN SERPL-MCNC: 3.9 G/DL (ref 3.4–5)
ALBUMIN/GLOB SERPL: 0.9 {RATIO} (ref 1–2)
ALP LIVER SERPL-CCNC: 133 U/L
ALT SERPL-CCNC: 24 U/L
ANION GAP SERPL CALC-SCNC: 8 MMOL/L (ref 0–18)
AST SERPL-CCNC: 22 U/L (ref 15–37)
BASOPHILS # BLD AUTO: 0.17 X10(3) UL (ref 0–0.2)
BASOPHILS NFR BLD AUTO: 1.8 %
BILIRUB SERPL-MCNC: 0.2 MG/DL (ref 0.1–2)
BUN BLD-MCNC: 11 MG/DL (ref 7–18)
BUN/CREAT SERPL: 13.4 (ref 10–20)
CALCIUM BLD-MCNC: 9.9 MG/DL (ref 8.5–10.1)
CHLORIDE SERPL-SCNC: 104 MMOL/L (ref 98–112)
CO2 SERPL-SCNC: 26 MMOL/L (ref 21–32)
CREAT BLD-MCNC: 0.82 MG/DL
DEPRECATED RDW RBC AUTO: 56.3 FL (ref 35.1–46.3)
EOSINOPHIL # BLD AUTO: 0.21 X10(3) UL (ref 0–0.7)
EOSINOPHIL NFR BLD AUTO: 2.3 %
ERYTHROCYTE [DISTWIDTH] IN BLOOD BY AUTOMATED COUNT: 17.1 % (ref 11–15)
EST. AVERAGE GLUCOSE BLD GHB EST-MCNC: 123 MG/DL (ref 68–126)
GLOBULIN PLAS-MCNC: 4.4 G/DL (ref 2.8–4.4)
GLUCOSE BLD-MCNC: 108 MG/DL (ref 70–99)
HBA1C MFR BLD HPLC: 5.9 % (ref ?–5.7)
HCT VFR BLD AUTO: 40.9 %
HGB BLD-MCNC: 12.9 G/DL
IMM GRANULOCYTES # BLD AUTO: 0.03 X10(3) UL (ref 0–1)
IMM GRANULOCYTES NFR BLD: 0.3 %
IRON SATURATION: 9 %
IRON SERPL-MCNC: 46 UG/DL
LYMPHOCYTES # BLD AUTO: 3.02 X10(3) UL (ref 1–4)
LYMPHOCYTES NFR BLD AUTO: 32.8 %
M PROTEIN MFR SERPL ELPH: 8.3 G/DL (ref 6.4–8.2)
MCH RBC QN AUTO: 28.2 PG (ref 26–34)
MCHC RBC AUTO-ENTMCNC: 31.5 G/DL (ref 31–37)
MCV RBC AUTO: 89.5 FL
MONOCYTES # BLD AUTO: 0.72 X10(3) UL (ref 0.1–1)
MONOCYTES NFR BLD AUTO: 7.8 %
NEUTROPHILS # BLD AUTO: 5.05 X10 (3) UL (ref 1.5–7.7)
NEUTROPHILS # BLD AUTO: 5.05 X10(3) UL (ref 1.5–7.7)
NEUTROPHILS NFR BLD AUTO: 55 %
OSMOLALITY SERPL CALC.SUM OF ELEC: 286 MOSM/KG (ref 275–295)
PATIENT FASTING Y/N/NP: YES
PLATELET # BLD AUTO: 525 10(3)UL (ref 150–450)
POTASSIUM SERPL-SCNC: 4.1 MMOL/L (ref 3.5–5.1)
RBC # BLD AUTO: 4.57 X10(6)UL
SODIUM SERPL-SCNC: 138 MMOL/L (ref 136–145)
TOTAL IRON BINDING CAPACITY: 495 UG/DL (ref 240–450)
TRANSFERRIN SERPL-MCNC: 332 MG/DL (ref 200–360)
WBC # BLD AUTO: 9.2 X10(3) UL (ref 4–11)

## 2021-07-07 PROCEDURE — 85025 COMPLETE CBC W/AUTO DIFF WBC: CPT

## 2021-07-07 PROCEDURE — 80053 COMPREHEN METABOLIC PANEL: CPT

## 2021-07-07 PROCEDURE — 36415 COLL VENOUS BLD VENIPUNCTURE: CPT

## 2021-07-07 PROCEDURE — 84466 ASSAY OF TRANSFERRIN: CPT

## 2021-07-07 PROCEDURE — 83540 ASSAY OF IRON: CPT

## 2021-07-07 PROCEDURE — 83036 HEMOGLOBIN GLYCOSYLATED A1C: CPT

## 2021-09-28 ENCOUNTER — HOSPITAL ENCOUNTER (EMERGENCY)
Facility: HOSPITAL | Age: 56
Discharge: HOME | End: 2021-09-28
Payer: COMMERCIAL

## 2021-09-28 ENCOUNTER — APPOINTMENT (OUTPATIENT)
Dept: GENERAL RADIOLOGY | Facility: HOSPITAL | Age: 56
End: 2021-09-28
Payer: COMMERCIAL

## 2021-09-28 VITALS
OXYGEN SATURATION: 98 % | WEIGHT: 134 LBS | TEMPERATURE: 98 F | HEART RATE: 101 BPM | SYSTOLIC BLOOD PRESSURE: 153 MMHG | DIASTOLIC BLOOD PRESSURE: 95 MMHG | RESPIRATION RATE: 20 BRPM | BODY MASS INDEX: 21.03 KG/M2 | HEIGHT: 67 IN

## 2021-09-28 DIAGNOSIS — M25.531 RIGHT WRIST PAIN: Primary | ICD-10-CM

## 2021-09-28 PROCEDURE — 73110 X-RAY EXAM OF WRIST: CPT

## 2021-09-28 PROCEDURE — 96372 THER/PROPH/DIAG INJ SC/IM: CPT

## 2021-09-28 PROCEDURE — 99283 EMERGENCY DEPT VISIT LOW MDM: CPT

## 2021-09-28 PROCEDURE — 73130 X-RAY EXAM OF HAND: CPT

## 2021-09-28 RX ORDER — MORPHINE SULFATE 4 MG/ML
4 INJECTION, SOLUTION INTRAMUSCULAR; INTRAVENOUS ONCE
Status: COMPLETED | OUTPATIENT
Start: 2021-09-28 | End: 2021-09-28

## 2021-09-28 RX ORDER — IBUPROFEN 600 MG/1
600 TABLET ORAL EVERY 8 HOURS PRN
Qty: 30 TABLET | Refills: 0 | Status: SHIPPED | OUTPATIENT
Start: 2021-09-28 | End: 2021-10-05

## 2021-09-28 RX ORDER — OXYCODONE HYDROCHLORIDE AND ACETAMINOPHEN 5; 325 MG/1; MG/1
1 TABLET ORAL EVERY 4 HOURS PRN
Status: DISCONTINUED | OUTPATIENT
Start: 2021-09-28 | End: 2021-09-28

## 2021-09-28 RX ORDER — OXYCODONE HYDROCHLORIDE AND ACETAMINOPHEN 5; 325 MG/1; MG/1
1-2 TABLET ORAL EVERY 6 HOURS PRN
Qty: 10 TABLET | Refills: 0 | Status: SHIPPED | OUTPATIENT
Start: 2021-09-28 | End: 2021-10-01

## 2021-09-28 RX ORDER — OXYCODONE HYDROCHLORIDE AND ACETAMINOPHEN 5; 325 MG/1; MG/1
1 TABLET ORAL ONCE
Status: DISCONTINUED | OUTPATIENT
Start: 2021-09-28 | End: 2021-09-28

## 2021-09-28 NOTE — ED INITIAL ASSESSMENT (HPI)
Presents with right wrist/hand swelling and pain after mechanical fall this morning. Denies other injury.  No deformity noted

## 2021-09-28 NOTE — ED QUICK NOTES
Patient cleared for discharge by MD. Borrero with patient. Patient discharge instructions reviewed with patient including when and how to follow up  with healthcare provider and when to seek medical treatment.

## 2021-09-28 NOTE — ED PROVIDER NOTES
Patient Seen in: [facility] AND [facility] Emergency Department      History   Patient presents with:  Fall    Stated Complaint:     Subjective:   HPI    68-year-old female presents for evaluation after a fall.  Patient had mechanical fall onto outstretched right No    Drug use: No             Review of Systems    Positive for stated complaint:   Other systems are as noted in HPI. Constitutional and vital signs reviewed. All other systems reviewed and negative except as noted above.     Physical Exam     ED Tr Date: 9/28/2021  CONCLUSION:  1. No evidence of fractures or dislocations. 2. Soft tissue swelling and anterior bowing of the pronator quadratus fat pad consistent with history of acute trauma.  3. If patient's symptoms persist follow-up radiograph to rule

## 2021-10-07 ENCOUNTER — HOSPITAL ENCOUNTER (OUTPATIENT)
Dept: MRI IMAGING | Age: 56
Discharge: HOME | End: 2021-10-07
Attending: ORTHOPAEDIC SURGERY
Payer: COMMERCIAL

## 2021-10-07 DIAGNOSIS — M25.531 RIGHT WRIST PAIN: ICD-10-CM

## 2021-10-07 PROCEDURE — 73221 MRI JOINT UPR EXTREM W/O DYE: CPT | Performed by: ORTHOPAEDIC SURGERY

## 2021-11-22 ENCOUNTER — APPOINTMENT (OUTPATIENT)
Dept: HEMATOLOGY/ONCOLOGY | Facility: HOSPITAL | Age: 56
End: 2021-11-22
Attending: INTERNAL MEDICINE
Payer: COMMERCIAL

## 2022-01-25 ENCOUNTER — APPOINTMENT (OUTPATIENT)
Dept: CT IMAGING | Facility: HOSPITAL | Age: 57
DRG: 641 | End: 2022-01-25
Attending: EMERGENCY MEDICINE
Payer: COMMERCIAL

## 2022-01-25 ENCOUNTER — APPOINTMENT (OUTPATIENT)
Dept: GENERAL RADIOLOGY | Facility: HOSPITAL | Age: 57
DRG: 641 | End: 2022-01-25
Attending: EMERGENCY MEDICINE
Payer: COMMERCIAL

## 2022-01-25 ENCOUNTER — HOSPITAL ENCOUNTER (INPATIENT)
Facility: HOSPITAL | Age: 57
LOS: 1 days | Discharge: HOME | DRG: 641 | End: 2022-01-26
Attending: EMERGENCY MEDICINE | Admitting: HOSPITALIST
Payer: COMMERCIAL

## 2022-01-25 ENCOUNTER — LAB ENCOUNTER (OUTPATIENT)
Dept: LAB | Facility: HOSPITAL | Age: 57
End: 2022-01-25
Attending: INTERNAL MEDICINE
Payer: COMMERCIAL

## 2022-01-25 DIAGNOSIS — E87.1 HYPONATREMIA: Primary | ICD-10-CM

## 2022-01-25 DIAGNOSIS — I10 HTN (HYPERTENSION): Primary | ICD-10-CM

## 2022-01-25 DIAGNOSIS — E87.6 HYPOKALEMIA: ICD-10-CM

## 2022-01-25 LAB
ALBUMIN SERPL-MCNC: 4.2 G/DL (ref 3.4–5)
ALP LIVER SERPL-CCNC: 137 U/L
ANION GAP SERPL CALC-SCNC: 6 MMOL/L (ref 0–18)
ANION GAP SERPL CALC-SCNC: 8 MMOL/L (ref 0–18)
AST SERPL-CCNC: 24 U/L (ref 15–37)
BASOPHILS # BLD AUTO: 0.12 X10(3) UL (ref 0–0.2)
BASOPHILS NFR BLD AUTO: 0.9 %
BILIRUB DIRECT SERPL-MCNC: 0.1 MG/DL (ref 0–0.2)
BILIRUB SERPL-MCNC: 0.5 MG/DL (ref 0.1–2)
BILIRUB UR QL: NEGATIVE
BUN BLD-MCNC: 10 MG/DL (ref 7–18)
BUN BLD-MCNC: 11 MG/DL (ref 7–18)
BUN/CREAT SERPL: 12.2 (ref 10–20)
BUN/CREAT SERPL: 9.7 (ref 10–20)
CALCIUM BLD-MCNC: 10.2 MG/DL (ref 8.5–10.1)
CALCIUM BLD-MCNC: 9.9 MG/DL (ref 8.5–10.1)
CHLORIDE SERPL-SCNC: 86 MMOL/L (ref 98–112)
CHLORIDE SERPL-SCNC: 86 MMOL/L (ref 98–112)
CO2 SERPL-SCNC: 31 MMOL/L (ref 21–32)
CO2 SERPL-SCNC: 32 MMOL/L (ref 21–32)
CREAT BLD-MCNC: 0.9 MG/DL
CREAT BLD-MCNC: 1.03 MG/DL
DEPRECATED RDW RBC AUTO: 40 FL (ref 35.1–46.3)
EOSINOPHIL # BLD AUTO: 0.24 X10(3) UL (ref 0–0.7)
EOSINOPHIL NFR BLD AUTO: 1.7 %
ERYTHROCYTE [DISTWIDTH] IN BLOOD BY AUTOMATED COUNT: 13 % (ref 11–15)
FASTING STATUS PATIENT QL REPORTED: YES
GLUCOSE BLD-MCNC: 114 MG/DL (ref 70–99)
GLUCOSE BLD-MCNC: 118 MG/DL (ref 70–99)
GLUCOSE BLDC GLUCOMTR-MCNC: 121 MG/DL (ref 70–99)
GLUCOSE BLDC GLUCOMTR-MCNC: 129 MG/DL (ref 70–99)
GLUCOSE UR-MCNC: NEGATIVE MG/DL
HBA1C MFR BLD: 6.1 % (ref ?–5.7)
HCT VFR BLD AUTO: 38.5 %
HGB BLD-MCNC: 13.1 G/DL
IMM GRANULOCYTES # BLD AUTO: 0.04 X10(3) UL (ref 0–1)
IMM GRANULOCYTES NFR BLD: 0.3 %
KETONES UR-MCNC: NEGATIVE MG/DL
LEUKOCYTE ESTERASE UR QL STRIP.AUTO: NEGATIVE
LIPASE SERPL-CCNC: 79 U/L (ref 73–393)
LYMPHOCYTES # BLD AUTO: 4.69 X10(3) UL (ref 1–4)
LYMPHOCYTES NFR BLD AUTO: 33.5 %
MAGNESIUM SERPL-MCNC: 2.3 MG/DL (ref 1.6–2.6)
MCH RBC QN AUTO: 28.7 PG (ref 26–34)
MCHC RBC AUTO-ENTMCNC: 34 G/DL (ref 31–37)
MCV RBC AUTO: 84.4 FL
MONOCYTES # BLD AUTO: 1.17 X10(3) UL (ref 0.1–1)
MONOCYTES NFR BLD AUTO: 8.4 %
NEUTROPHILS # BLD AUTO: 7.75 X10 (3) UL (ref 1.5–7.7)
NEUTROPHILS # BLD AUTO: 7.75 X10(3) UL (ref 1.5–7.7)
NEUTROPHILS NFR BLD AUTO: 55.2 %
NITRITE UR QL STRIP.AUTO: NEGATIVE
NT-PROBNP SERPL-MCNC: 36 PG/ML (ref ?–125)
OSMOLALITY SERPL CALC.SUM OF ELEC: 258 MOSM/KG (ref 275–295)
OSMOLALITY SERPL CALC.SUM OF ELEC: 260 MOSM/KG (ref 275–295)
OSMOLALITY UR: 448 MOSM/KG (ref 300–1100)
PH UR: 5 [PH] (ref 5–8)
PLATELET # BLD AUTO: 587 10(3)UL (ref 150–450)
POTASSIUM SERPL-SCNC: 2.3 MMOL/L (ref 3.5–5.1)
POTASSIUM SERPL-SCNC: 3.6 MMOL/L (ref 3.5–5.1)
POTASSIUM UR-SCNC: 57.1 MMOL/L
PROT SERPL-MCNC: 8.5 G/DL (ref 6.4–8.2)
PROT UR-MCNC: NEGATIVE MG/DL
RBC # BLD AUTO: 4.56 X10(6)UL
SARS-COV-2 RNA RESP QL NAA+PROBE: NOT DETECTED
SODIUM SERPL-SCNC: 124 MMOL/L (ref 136–145)
SODIUM SERPL-SCNC: 125 MMOL/L (ref 136–145)
SODIUM SERPL-SCNC: 59 MMOL/L
SP GR UR STRIP: 1.01 (ref 1–1.03)
TSI SER-ACNC: 1.47 MIU/ML (ref 0.36–3.74)
UROBILINOGEN UR STRIP-ACNC: <2
WBC # BLD AUTO: 14 X10(3) UL (ref 4–11)

## 2022-01-25 PROCEDURE — 71046 X-RAY EXAM CHEST 2 VIEWS: CPT | Performed by: EMERGENCY MEDICINE

## 2022-01-25 PROCEDURE — 70450 CT HEAD/BRAIN W/O DYE: CPT | Performed by: EMERGENCY MEDICINE

## 2022-01-25 PROCEDURE — 99222 1ST HOSP IP/OBS MODERATE 55: CPT | Performed by: HOSPITALIST

## 2022-01-25 PROCEDURE — 36415 COLL VENOUS BLD VENIPUNCTURE: CPT

## 2022-01-25 PROCEDURE — 80048 BASIC METABOLIC PNL TOTAL CA: CPT

## 2022-01-25 RX ORDER — ACETAMINOPHEN 325 MG/1
650 TABLET ORAL EVERY 6 HOURS PRN
Status: DISCONTINUED | OUTPATIENT
Start: 2022-01-25 | End: 2022-01-26

## 2022-01-25 RX ORDER — POTASSIUM CHLORIDE 20 MEQ/1
20 TABLET, EXTENDED RELEASE ORAL ONCE
Status: COMPLETED | OUTPATIENT
Start: 2022-01-25 | End: 2022-01-25

## 2022-01-25 RX ORDER — PROCHLORPERAZINE EDISYLATE 5 MG/ML
5 INJECTION INTRAMUSCULAR; INTRAVENOUS EVERY 8 HOURS PRN
Status: CANCELLED | OUTPATIENT
Start: 2022-01-25

## 2022-01-25 RX ORDER — SODIUM CHLORIDE 9 MG/ML
INJECTION, SOLUTION INTRAVENOUS CONTINUOUS
Status: DISCONTINUED | OUTPATIENT
Start: 2022-01-25 | End: 2022-01-26

## 2022-01-25 RX ORDER — ONDANSETRON 2 MG/ML
4 INJECTION INTRAMUSCULAR; INTRAVENOUS EVERY 6 HOURS PRN
Status: DISCONTINUED | OUTPATIENT
Start: 2022-01-25 | End: 2022-01-26

## 2022-01-25 RX ORDER — DIAZEPAM 5 MG/1
5 TABLET ORAL ONCE
Status: COMPLETED | OUTPATIENT
Start: 2022-01-25 | End: 2022-01-25

## 2022-01-25 RX ORDER — LISINOPRIL 20 MG/1
20 TABLET ORAL 2 TIMES DAILY
COMMUNITY

## 2022-01-25 RX ORDER — METOCLOPRAMIDE 10 MG/1
5 TABLET ORAL
Status: DISCONTINUED | OUTPATIENT
Start: 2022-01-25 | End: 2022-01-26

## 2022-01-25 RX ORDER — HEPARIN SODIUM 5000 [USP'U]/ML
5000 INJECTION, SOLUTION INTRAVENOUS; SUBCUTANEOUS EVERY 12 HOURS SCHEDULED
Status: DISCONTINUED | OUTPATIENT
Start: 2022-01-25 | End: 2022-01-26

## 2022-01-25 RX ORDER — POTASSIUM CHLORIDE 14.9 MG/ML
20 INJECTION INTRAVENOUS ONCE
Status: COMPLETED | OUTPATIENT
Start: 2022-01-25 | End: 2022-01-26

## 2022-01-25 RX ORDER — METOCLOPRAMIDE HYDROCHLORIDE 5 MG/ML
10 INJECTION INTRAMUSCULAR; INTRAVENOUS EVERY 6 HOURS PRN
Status: CANCELLED | OUTPATIENT
Start: 2022-01-25

## 2022-01-25 RX ORDER — SODIUM CHLORIDE 9 MG/ML
1000 INJECTION, SOLUTION INTRAVENOUS ONCE
Status: COMPLETED | OUTPATIENT
Start: 2022-01-25 | End: 2022-01-25

## 2022-01-25 RX ORDER — ACETAMINOPHEN 500 MG
1000 TABLET ORAL ONCE
Status: COMPLETED | OUTPATIENT
Start: 2022-01-25 | End: 2022-01-25

## 2022-01-25 RX ORDER — TEMAZEPAM 15 MG/1
15 CAPSULE ORAL NIGHTLY PRN
Status: DISCONTINUED | OUTPATIENT
Start: 2022-01-25 | End: 2022-01-26

## 2022-01-25 RX ORDER — HYDRALAZINE HYDROCHLORIDE 20 MG/ML
10 INJECTION INTRAMUSCULAR; INTRAVENOUS EVERY 4 HOURS PRN
Status: DISCONTINUED | OUTPATIENT
Start: 2022-01-25 | End: 2022-01-26

## 2022-01-25 RX ORDER — DEXTROSE MONOHYDRATE 25 G/50ML
50 INJECTION, SOLUTION INTRAVENOUS
Status: DISCONTINUED | OUTPATIENT
Start: 2022-01-25 | End: 2022-01-26

## 2022-01-25 NOTE — H&P
[facility]    PATIENT'S NAME: Aruna Jefferson, [facility]   ATTENDING PHYSICIAN: Rani Sanford MD   PATIENT ACCOUNT#:   [id number]    LOCATION:  [address] [place], [place] [address]Baljit Cisneros #:   [id number]       YOB: 1965  ADMISSION DATE:       01/25/2022    HISTORY AND PHYSICAL EXAMINATION    CHIEF COMPLAINT:  Hypoosmolar hyponatremia, hypokalemia, and nausea. HISTORY OF PRESENT ILLNESS:  Patient is a 80-year-old  female with history of chronic pancreatic insufficiency and hypertension, who was noticing that she had generalized fatigue, nausea, started around 2 weeks ago. She had recovered from COVID infection, which was mild, around 4 weeks ago. Also recently noted her blood pressure has been elevated. She continued to use her usual regimen which is Cardizem 180 mg twice a day and lisinopril 20 mg b.i.d. She took 2 dosages of hydrochlorothiazide last week on 2 different occasions in hopes to control blood pressure which has not been successful. She continued to have 180/100 blood pressure measurements on average. Patient continued to have nausea, poor oral intake. She ate a steak a few days ago and she had to vomit after that. No fever or chills. No body aches. Today, she came into the emergency department after she was found to have a low sodium on a blood test.  Repeat blood test shows sodium 125, potassium 2.3, chloride 86, creatinine 1.03, calculated osmolality of 260. Liver function tests were unremarkable. White blood cell count of 14 with slightly elevated platelets, neutrophils, and lymphocytes. CT scan of the brain and chest x-ray were unremarkable. EKG showed normal sinus rhythm. ProBNP was negative. Lipase still pending. Her urine osmolality was 448, magnesium 2.3, and urine sodium 59. PAST MEDICAL HISTORY:  Diabetes mellitus type 1, secondary to pancreatic insufficiency. Currently uses insulin pump. Follows up with Endocrinology, Dr. Renny Gu. History of Hodgkin lymphoma diagnosed in the 46s. Received chemotherapy and splenectomy. She had right breast cancer, status post bilateral skin-sparing mastectomies and reconstruction with permanent implants. She has a history of hypertension, gastroparesis, left ventricular diastolic dysfunction. PAST SURGICAL HISTORY:  As mentioned above. Also, she had appendectomy; stress fracture, status post hip pinning. MEDICATIONS:  Please see medication reconciliation list.     ALLERGIES:  Iodinated agent contrast.  Penicillin causes mouth sores. FAMILY HISTORY:  Mother had glaucoma and hypertension. Father had coronary artery disease. SOCIAL HISTORY:  No tobacco, alcohol or drug use. Lives with her family. At baseline, independent for basic activities of daily living. REVIEW OF SYSTEMS:  Nausea, generalized fatigue in the last 2 weeks. Also increased blood pressure. Patient has been noticing that she had poor oral intake, and she had chronic loose bowel movement alternating with constipation. She could not tolerate pancrelipase for her pancreatic insufficiency in the past.  No fever or chills. No sick contacts. Other 12-point review of systems is negative. PHYSICAL EXAMINATION:    GENERAL:  Alert and oriented to time, place and person. No acute distress. Slightly anxious. VITAL SIGNS:  Temperature 97.2; pulse 89; respiratory rate 24; blood pressure 170/113, then it came down to 140/87 spontaneously. HEENT:  Atraumatic. Oropharynx clear. Dry mucous membranes. Normal hard and soft palate. Eyes:  Anicteric sclerae. NECK:  Supple. No lymphadenopathy. Trachea midline. Full range of motion. LUNGS:  Clear to auscultation bilaterally. Normal respiratory effort. No intercostal retractions. HEART:  Regular rate and rhythm. S1 and S2 auscultated. No murmur. ABDOMEN:  Soft, nondistended. No tenderness. Positive bowel sounds.    EXTREMITIES:  No peripheral edema, clubbing or cyanosis. NEUROLOGIC:  Motor and sensory intact. Cranial nerves II to XII are intact. ASSESSMENT:    1. Hypoosmolar hyponatremia, most likely related to chronic nausea and abdominal cramps. Patient does have an underlying gastroparesis, and nausea is a strong stimulator for ADH secretion. 2.   Hypokalemia, possibly related to loose bowel movements and recent use of hydrochlorothiazide plus poor oral intake. 3.   Dehydration. 4.   Anxiety. PLAN:  Patient will be admitted to general medical floor. Use antinausea medications including metoclopramide. Restrict free water to 1500 mL daily, and replace her potassium. Nephrology, endocrinology, and cardiology consults were notified. Further recommendations to follow.      Dictated By Imelda Bradford MD  d: 01/25/2022 17:10:06  t: 01/25/2022 17:31:56  Job [id number]  TW/

## 2022-01-25 NOTE — ED INITIAL ASSESSMENT (HPI)
Patient from home with c/o \"bad headache\" since yesterday. Had labs drawn this morning and was noted to have hyponatremia.

## 2022-01-25 NOTE — ED QUICK NOTES
Orders for admission, patient is aware of plan and ready to go upstairs. Any questions, please call ED RN Emily Kowalski at extension [phone].      Patient Covid vaccination status: Partially vaccinated     COVID Test Ordered in ED: Rapid SARS-CoV-2 by PCR    COVID Suspicion at Admission: No risk with negative rapid    Running Infusions:  NS at 125    Mental Status/LOC at time of transport: a/ox4    Other pertinent information:   CIWA score: N/A   NIH score:  N/A

## 2022-01-26 ENCOUNTER — APPOINTMENT (OUTPATIENT)
Dept: CV DIAGNOSTICS | Facility: HOSPITAL | Age: 57
DRG: 641 | End: 2022-01-26
Attending: NURSE PRACTITIONER
Payer: COMMERCIAL

## 2022-01-26 VITALS
OXYGEN SATURATION: 99 % | HEIGHT: 67 IN | TEMPERATURE: 99 F | DIASTOLIC BLOOD PRESSURE: 87 MMHG | WEIGHT: 148 LBS | SYSTOLIC BLOOD PRESSURE: 139 MMHG | HEART RATE: 77 BPM | BODY MASS INDEX: 23.23 KG/M2 | RESPIRATION RATE: 18 BRPM

## 2022-01-26 LAB
ANION GAP SERPL CALC-SCNC: 5 MMOL/L (ref 0–18)
ANION GAP SERPL CALC-SCNC: 6 MMOL/L (ref 0–18)
BASOPHILS # BLD AUTO: 0.1 X10(3) UL (ref 0–0.2)
BASOPHILS NFR BLD AUTO: 1.4 %
BUN BLD-MCNC: 11 MG/DL (ref 7–18)
BUN BLD-MCNC: 9 MG/DL (ref 7–18)
BUN/CREAT SERPL: 11.8 (ref 10–20)
BUN/CREAT SERPL: 13.6 (ref 10–20)
CALCIUM BLD-MCNC: 8.6 MG/DL (ref 8.5–10.1)
CALCIUM BLD-MCNC: 9.5 MG/DL (ref 8.5–10.1)
CHLORIDE SERPL-SCNC: 97 MMOL/L (ref 98–112)
CHLORIDE SERPL-SCNC: 98 MMOL/L (ref 98–112)
CO2 SERPL-SCNC: 29 MMOL/L (ref 21–32)
CO2 SERPL-SCNC: 30 MMOL/L (ref 21–32)
CREAT BLD-MCNC: 0.76 MG/DL
CREAT BLD-MCNC: 0.81 MG/DL
DEPRECATED RDW RBC AUTO: 42.7 FL (ref 35.1–46.3)
EOSINOPHIL # BLD AUTO: 0.21 X10(3) UL (ref 0–0.7)
EOSINOPHIL NFR BLD AUTO: 3 %
ERYTHROCYTE [DISTWIDTH] IN BLOOD BY AUTOMATED COUNT: 13.4 % (ref 11–15)
GLUCOSE BLD-MCNC: 106 MG/DL (ref 70–99)
GLUCOSE BLD-MCNC: 110 MG/DL (ref 70–99)
GLUCOSE BLDC GLUCOMTR-MCNC: 133 MG/DL (ref 70–99)
GLUCOSE BLDC GLUCOMTR-MCNC: 96 MG/DL (ref 70–99)
HCT VFR BLD AUTO: 37.2 %
HGB BLD-MCNC: 12.3 G/DL
IMM GRANULOCYTES # BLD AUTO: 0.01 X10(3) UL (ref 0–1)
IMM GRANULOCYTES NFR BLD: 0.1 %
LYMPHOCYTES # BLD AUTO: 2.83 X10(3) UL (ref 1–4)
LYMPHOCYTES NFR BLD AUTO: 40.5 %
MAGNESIUM SERPL-MCNC: 2.5 MG/DL (ref 1.6–2.6)
MCH RBC QN AUTO: 28.7 PG (ref 26–34)
MCHC RBC AUTO-ENTMCNC: 33.1 G/DL (ref 31–37)
MCV RBC AUTO: 86.9 FL
MONOCYTES # BLD AUTO: 1.01 X10(3) UL (ref 0.1–1)
MONOCYTES NFR BLD AUTO: 14.5 %
NEUTROPHILS # BLD AUTO: 2.82 X10 (3) UL (ref 1.5–7.7)
NEUTROPHILS # BLD AUTO: 2.82 X10(3) UL (ref 1.5–7.7)
NEUTROPHILS NFR BLD AUTO: 40.5 %
OSMOLALITY SERPL CALC.SUM OF ELEC: 273 MOSM/KG (ref 275–295)
OSMOLALITY SERPL CALC.SUM OF ELEC: 276 MOSM/KG (ref 275–295)
PHOSPHATE SERPL-MCNC: 3.4 MG/DL (ref 2.5–4.9)
PLATELET # BLD AUTO: 572 10(3)UL (ref 150–450)
POTASSIUM SERPL-SCNC: 3.3 MMOL/L (ref 3.5–5.1)
POTASSIUM SERPL-SCNC: 4.1 MMOL/L (ref 3.5–5.1)
RBC # BLD AUTO: 4.28 X10(6)UL
SODIUM SERPL-SCNC: 132 MMOL/L (ref 136–145)
SODIUM SERPL-SCNC: 133 MMOL/L (ref 136–145)
WBC # BLD AUTO: 7 X10(3) UL (ref 4–11)

## 2022-01-26 PROCEDURE — 93306 TTE W/DOPPLER COMPLETE: CPT | Performed by: NURSE PRACTITIONER

## 2022-01-26 PROCEDURE — 99239 HOSP IP/OBS DSCHRG MGMT >30: CPT | Performed by: INTERNAL MEDICINE

## 2022-01-26 RX ORDER — DILTIAZEM HYDROCHLORIDE 180 MG/1
180 CAPSULE, EXTENDED RELEASE ORAL DAILY
Status: DISCONTINUED | OUTPATIENT
Start: 2022-01-26 | End: 2022-01-26

## 2022-01-26 RX ORDER — DULOXETIN HYDROCHLORIDE 60 MG/1
60 CAPSULE, DELAYED RELEASE ORAL DAILY
Status: DISCONTINUED | OUTPATIENT
Start: 2022-01-26 | End: 2022-01-26

## 2022-01-26 RX ORDER — LISINOPRIL 20 MG/1
20 TABLET ORAL DAILY
Status: DISCONTINUED | OUTPATIENT
Start: 2022-01-26 | End: 2022-01-26

## 2022-01-26 NOTE — PLAN OF CARE
DC per MD order. DC paperwork reviewed; all questions answered. Pt states her PIV was removed by Dr. Denise Beaver. Her  (also an MD at [place]) was at bedside when this occurred. I did not witness, nor did I see if PIV was in place. Pt had dressed and put coat on and stated \"I had two MD's help me take out my IV\". Pt escorted to elevator. Pt stable at DC. Problem: Patient Centered Care  Goal: Patient preferences are identified and integrated in the patient's plan of care  Description: Interventions:  - What would you like us to know as we care for you?  My  works here  - Provide timely, complete, and accurate information to patient/family  - Incorporate patient and family knowledge, values, beliefs, and cultural backgrounds into the planning and delivery of care  - Encourage patient/family to participate in care and decision-making at the level they choose  - Honor patient and family perspectives and choices  Outcome: Adequate for Discharge     Problem: PAIN - ADULT  Goal: Verbalizes/displays adequate comfort level or patient's stated pain goal  Description: INTERVENTIONS:  - Encourage pt to monitor pain and request assistance  - Assess pain using appropriate pain scale  - Administer analgesics based on type and severity of pain and evaluate response  - Implement non-pharmacological measures as appropriate and evaluate response  - Consider cultural and social influences on pain and pain management  - Manage/alleviate anxiety  - Utilize distraction and/or relaxation techniques  - Monitor for opioid side effects  - Notify MD/LIP if interventions unsuccessful or patient reports new pain  - Anticipate increased pain with activity and pre-medicate as appropriate  Outcome: Adequate for Discharge     Problem: RISK FOR INFECTION - ADULT  Goal: Absence of fever/infection during anticipated neutropenic period  Description: INTERVENTIONS  - Monitor WBC  - Administer growth factors as ordered  - Implement neutropenic guidelines  Outcome: Adequate for Discharge     Problem: SAFETY ADULT - FALL  Goal: Free from fall injury  Description: INTERVENTIONS:  - Assess pt frequently for physical needs  - Identify cognitive and physical deficits and behaviors that affect risk of falls.   - [place] fall precautions as indicated by assessment.  - Educate pt/family on patient safety including physical limitations  - Instruct pt to call for assistance with activity based on assessment  - Modify environment to reduce risk of injury  - Provide assistive devices as appropriate  - Consider OT/PT consult to assist with strengthening/mobility  - Encourage toileting schedule  Outcome: Adequate for Discharge     Problem: DISCHARGE PLANNING  Goal: Discharge to home or other facility with appropriate resources  Description: INTERVENTIONS:  - Identify barriers to discharge w/pt and caregiver  - Include patient/family/discharge partner in discharge planning  - Arrange for needed discharge resources and transportation as appropriate  - Identify discharge learning needs (meds, wound care, etc)  - Arrange for interpreters to assist at discharge as needed  - Consider post-discharge preferences of patient/family/discharge partner  - Complete POLST form as appropriate  - Assess patient's ability to be responsible for managing their own health  - Refer to Case Management Department for coordinating discharge planning if the patient needs post-hospital services based on physician/LIP order or complex needs related to functional status, cognitive ability or social support system  Outcome: Adequate for Discharge

## 2022-01-26 NOTE — PLAN OF CARE
Problem: RISK FOR INFECTION - ADULT  Goal: Absence of fever/infection during anticipated neutropenic period  Description: INTERVENTIONS  - Monitor WBC  - Administer growth factors as ordered  - Implement neutropenic guidelines  Outcome: Progressing     Problem: SAFETY ADULT - FALL  Goal: Free from fall injury  Description: INTERVENTIONS:  - Assess pt frequently for physical needs  - Identify cognitive and physical deficits and behaviors that affect risk of falls. - [place] fall precautions as indicated by assessment.  - Educate pt/family on patient safety including physical limitations  - Instruct pt to call for assistance with activity based on assessment  - Modify environment to reduce risk of injury  - Provide assistive devices as appropriate  - Consider OT/PT consult to assist with strengthening/mobility  - Encourage toileting schedule  Outcome: Progressing     Problem: DISCHARGE PLANNING  Goal: Discharge to home or other facility with appropriate resources  Description: INTERVENTIONS:  - Identify barriers to discharge w/pt and caregiver  - Include patient/family/discharge partner in discharge planning  - Arrange for needed discharge resources and transportation as appropriate  - Identify discharge learning needs (meds, wound care, etc)  - Arrange for interpreters to assist at discharge as needed  - Consider post-discharge preferences of patient/family/discharge partner  - Complete POLST form as appropriate  - Assess patient's ability to be responsible for managing their own health  - Refer to Case Management Department for coordinating discharge planning if the patient needs post-hospital services based on physician/LIP order or complex needs related to functional status, cognitive ability or social support system  Outcome: Progressing   Patient is alert and oriented x4. On room air. Denies pain or headache.  Potassium replacement done per Dr. Ninfa Kwong orders, spoke with him per phone to relay STAT BMP orders. He also discontinued pt's IV fluids. Patient currently resting in bed. Call light within reach. Intentional rounding maintained.

## 2022-01-26 NOTE — PLAN OF CARE
Admission completed at bedside. Insulin pump is not on patient at this time. Vital signs monitored. Fluids continued. Safety measures are in place. Plan of care updated with patient and  at bedside. Problem: Patient Centered Care  Goal: Patient preferences are identified and integrated in the patient's plan of care  Description: Interventions:  - What would you like us to know as we care for you? I have been in the hospital multiple times in the past.  - Provide timely, complete, and accurate information to patient/family  - Incorporate patient and family knowledge, values, beliefs, and cultural backgrounds into the planning and delivery of care  - Encourage patient/family to participate in care and decision-making at the level they choose  - Honor patient and family perspectives and choices  Outcome: Progressing     Problem: RISK FOR INFECTION - ADULT  Goal: Absence of fever/infection during anticipated neutropenic period  Description: INTERVENTIONS  - Monitor WBC  - Administer growth factors as ordered  - Implement neutropenic guidelines  Outcome: Progressing     Problem: SAFETY ADULT - FALL  Goal: Free from fall injury  Description: INTERVENTIONS:  - Assess pt frequently for physical needs  - Identify cognitive and physical deficits and behaviors that affect risk of falls.   - [place] fall precautions as indicated by assessment.  - Educate pt/family on patient safety including physical limitations  - Instruct pt to call for assistance with activity based on assessment  - Modify environment to reduce risk of injury  - Provide assistive devices as appropriate  - Consider OT/PT consult to assist with strengthening/mobility  - Encourage toileting schedule  Outcome: Progressing     Problem: DISCHARGE PLANNING  Goal: Discharge to home or other facility with appropriate resources  Description: INTERVENTIONS:  - Identify barriers to discharge w/pt and caregiver  - Include patient/family/discharge partner in discharge planning  - Arrange for needed discharge resources and transportation as appropriate  - Identify discharge learning needs (meds, wound care, etc)  - Arrange for interpreters to assist at discharge as needed  - Consider post-discharge preferences of patient/family/discharge partner  - Complete POLST form as appropriate  - Assess patient's ability to be responsible for managing their own health  - Refer to Case Management Department for coordinating discharge planning if the patient needs post-hospital services based on physician/LIP order or complex needs related to functional status, cognitive ability or social support system  Outcome: Progressing

## 2022-01-28 ENCOUNTER — TELEPHONE (OUTPATIENT)
Dept: NEPHROLOGY | Facility: CLINIC | Age: 57
End: 2022-01-28

## 2022-01-28 DIAGNOSIS — E87.0 HYPERNATREMIA: Primary | ICD-10-CM

## 2022-01-28 DIAGNOSIS — E87.1 HYPONATREMIA: ICD-10-CM

## 2022-02-01 ENCOUNTER — LAB ENCOUNTER (OUTPATIENT)
Dept: LAB | Facility: HOSPITAL | Age: 57
End: 2022-02-01
Attending: INTERNAL MEDICINE
Payer: COMMERCIAL

## 2022-02-01 DIAGNOSIS — E87.1 HYPONATREMIA: ICD-10-CM

## 2022-02-01 LAB
ANION GAP SERPL CALC-SCNC: 8 MMOL/L (ref 0–18)
BUN BLD-MCNC: 9 MG/DL (ref 7–18)
BUN/CREAT SERPL: 10.2 (ref 10–20)
CALCIUM BLD-MCNC: 9.6 MG/DL (ref 8.5–10.1)
CHLORIDE SERPL-SCNC: 100 MMOL/L (ref 98–112)
CO2 SERPL-SCNC: 26 MMOL/L (ref 21–32)
CREAT BLD-MCNC: 0.88 MG/DL
FASTING STATUS PATIENT QL REPORTED: YES
GLUCOSE BLD-MCNC: 90 MG/DL (ref 70–99)
OSMOLALITY SERPL CALC.SUM OF ELEC: 276 MOSM/KG (ref 275–295)
POTASSIUM SERPL-SCNC: 4.3 MMOL/L (ref 3.5–5.1)
SODIUM SERPL-SCNC: 134 MMOL/L (ref 136–145)

## 2022-02-01 PROCEDURE — 80048 BASIC METABOLIC PNL TOTAL CA: CPT

## 2022-02-01 PROCEDURE — 36415 COLL VENOUS BLD VENIPUNCTURE: CPT

## 2022-02-05 ENCOUNTER — MOBILE ENCOUNTER (OUTPATIENT)
Dept: ANESTHESIOLOGY | Facility: HOSPITAL | Age: 57
End: 2022-02-05

## 2022-02-08 ENCOUNTER — MOBILE ENCOUNTER (OUTPATIENT)
Dept: ANESTHESIOLOGY | Facility: HOSPITAL | Age: 57
End: 2022-02-08

## 2022-02-26 ENCOUNTER — LAB ENCOUNTER (OUTPATIENT)
Dept: LAB | Facility: HOSPITAL | Age: 57
End: 2022-02-26
Attending: INTERNAL MEDICINE
Payer: COMMERCIAL

## 2022-02-26 DIAGNOSIS — E87.6 HYPOKALEMIA: ICD-10-CM

## 2022-02-26 DIAGNOSIS — E87.1 HYPONATREMIA: ICD-10-CM

## 2022-02-26 DIAGNOSIS — R06.00 DYSPNEA ON EXERTION: ICD-10-CM

## 2022-02-26 DIAGNOSIS — E86.0 DEHYDRATION: ICD-10-CM

## 2022-02-26 LAB
ANION GAP SERPL CALC-SCNC: 6 MMOL/L (ref 0–18)
BUN BLD-MCNC: 18 MG/DL (ref 7–18)
BUN/CREAT SERPL: 18.4 (ref 10–20)
CALCIUM BLD-MCNC: 8.3 MG/DL (ref 8.5–10.1)
CHLORIDE SERPL-SCNC: 103 MMOL/L (ref 98–112)
CO2 SERPL-SCNC: 26 MMOL/L (ref 21–32)
CREAT BLD-MCNC: 0.98 MG/DL
FASTING STATUS PATIENT QL REPORTED: YES
GLUCOSE BLD-MCNC: 97 MG/DL (ref 70–99)
OSMOLALITY SERPL CALC.SUM OF ELEC: 282 MOSM/KG (ref 275–295)
POTASSIUM SERPL-SCNC: 3.8 MMOL/L (ref 3.5–5.1)
SODIUM SERPL-SCNC: 135 MMOL/L (ref 136–145)

## 2022-02-26 PROCEDURE — 36415 COLL VENOUS BLD VENIPUNCTURE: CPT

## 2022-02-26 PROCEDURE — 80048 BASIC METABOLIC PNL TOTAL CA: CPT

## 2022-03-19 ENCOUNTER — TELEPHONE (OUTPATIENT)
Dept: PAIN CLINIC | Facility: HOSPITAL | Age: 57
End: 2022-03-19

## 2022-03-20 NOTE — TELEPHONE ENCOUNTER
Patient c/o Bilateral Lumbar radiculopathy after lifting injury with bilateral weakness lower extremities non responsive to conservative therapy  Plan MRI L/S spine

## 2022-07-01 ENCOUNTER — MOBILE ENCOUNTER (OUTPATIENT)
Dept: ANESTHESIOLOGY | Facility: HOSPITAL | Age: 57
End: 2022-07-01

## 2022-07-01 DIAGNOSIS — C50.919 BREAST CANCER (HCC): Primary | ICD-10-CM

## 2022-07-06 ENCOUNTER — LAB ENCOUNTER (OUTPATIENT)
Dept: LAB | Facility: HOSPITAL | Age: 57
End: 2022-07-06
Attending: ANESTHESIOLOGY
Payer: COMMERCIAL

## 2022-07-06 DIAGNOSIS — E87.1 HYPONATREMIA: ICD-10-CM

## 2022-07-06 DIAGNOSIS — C50.919 BREAST CANCER (HCC): ICD-10-CM

## 2022-07-06 DIAGNOSIS — R06.00 DYSPNEA ON EXERTION: ICD-10-CM

## 2022-07-06 DIAGNOSIS — E86.0 DEHYDRATION: ICD-10-CM

## 2022-07-06 DIAGNOSIS — E87.6 HYPOKALEMIA: ICD-10-CM

## 2022-07-06 LAB
ANION GAP SERPL CALC-SCNC: 5 MMOL/L (ref 0–18)
BASOPHILS # BLD AUTO: 0.18 X10(3) UL (ref 0–0.2)
BASOPHILS NFR BLD AUTO: 1.7 %
BUN BLD-MCNC: 14 MG/DL (ref 7–18)
BUN/CREAT SERPL: 17.3 (ref 10–20)
CALCIUM BLD-MCNC: 9.2 MG/DL (ref 8.5–10.1)
CHLORIDE SERPL-SCNC: 101 MMOL/L (ref 98–112)
CO2 SERPL-SCNC: 30 MMOL/L (ref 21–32)
CREAT BLD-MCNC: 0.81 MG/DL
DEPRECATED RDW RBC AUTO: 46.4 FL (ref 35.1–46.3)
EOSINOPHIL # BLD AUTO: 0.42 X10(3) UL (ref 0–0.7)
EOSINOPHIL NFR BLD AUTO: 4 %
ERYTHROCYTE [DISTWIDTH] IN BLOOD BY AUTOMATED COUNT: 13.6 % (ref 11–15)
EST. AVERAGE GLUCOSE BLD GHB EST-MCNC: 123 MG/DL (ref 68–126)
FASTING STATUS PATIENT QL REPORTED: NO
GLUCOSE BLD-MCNC: 86 MG/DL (ref 70–99)
HBA1C MFR BLD: 5.9 % (ref ?–5.7)
HCT VFR BLD AUTO: 37.2 %
HGB BLD-MCNC: 11.8 G/DL
IMM GRANULOCYTES # BLD AUTO: 0.01 X10(3) UL (ref 0–1)
IMM GRANULOCYTES NFR BLD: 0.1 %
LYMPHOCYTES # BLD AUTO: 4.72 X10(3) UL (ref 1–4)
LYMPHOCYTES NFR BLD AUTO: 45.2 %
MCH RBC QN AUTO: 29.4 PG (ref 26–34)
MCHC RBC AUTO-ENTMCNC: 31.7 G/DL (ref 31–37)
MCV RBC AUTO: 92.8 FL
MONOCYTES # BLD AUTO: 1.21 X10(3) UL (ref 0.1–1)
MONOCYTES NFR BLD AUTO: 11.6 %
NEUTROPHILS # BLD AUTO: 3.9 X10 (3) UL (ref 1.5–7.7)
NEUTROPHILS # BLD AUTO: 3.9 X10(3) UL (ref 1.5–7.7)
NEUTROPHILS NFR BLD AUTO: 37.4 %
OSMOLALITY SERPL CALC.SUM OF ELEC: 282 MOSM/KG (ref 275–295)
PLATELET # BLD AUTO: 548 10(3)UL (ref 150–450)
POTASSIUM SERPL-SCNC: 3.8 MMOL/L (ref 3.5–5.1)
RBC # BLD AUTO: 4.01 X10(6)UL
SODIUM SERPL-SCNC: 136 MMOL/L (ref 136–145)
WBC # BLD AUTO: 10.4 X10(3) UL (ref 4–11)

## 2022-07-06 PROCEDURE — 80048 BASIC METABOLIC PNL TOTAL CA: CPT

## 2022-07-06 PROCEDURE — 36415 COLL VENOUS BLD VENIPUNCTURE: CPT

## 2022-07-06 PROCEDURE — 83036 HEMOGLOBIN GLYCOSYLATED A1C: CPT

## 2022-07-06 PROCEDURE — 85025 COMPLETE CBC W/AUTO DIFF WBC: CPT

## 2022-07-26 NOTE — TELEPHONE ENCOUNTER
Routed to GI Schedulers as a HP  GI schedulers- please schedule patient. Previously Declined (within the last year)

## 2022-08-03 NOTE — TELEPHONE ENCOUNTER
Patient called and reported that she is having BMs per her pattern with occasional constipation, is passing gas and hearing bowel sounds per her pattern. Patient did not have nausea and cramping to emesis the past 2 days.    Patient is now taking Carafate 4 times daily as ordered.     Patient would like to switch back to rabeprazole 20 mg twice daily and retry approval with insurance.   Spouse called requesting appt with Dr. Ofelia Thompson. No appts available. Spouse informed appt request will be sent to Dr. Ofelia Thompson.

## 2022-08-08 ENCOUNTER — TELEPHONE (OUTPATIENT)
Dept: PHYSICAL MEDICINE AND REHAB | Facility: CLINIC | Age: 57
End: 2022-08-08

## 2022-08-08 RX ORDER — CYCLOBENZAPRINE HCL 10 MG
10 TABLET ORAL NIGHTLY
Qty: 30 TABLET | Refills: 0 | Status: SHIPPED | OUTPATIENT
Start: 2022-08-08 | End: 2022-09-07

## 2022-08-08 RX ORDER — METHYLPREDNISOLONE 4 MG/1
TABLET ORAL
Qty: 1 EACH | Refills: 0 | Status: SHIPPED | OUTPATIENT
Start: 2022-08-08

## 2022-08-08 NOTE — TELEPHONE ENCOUNTER
Patient called with neck pain, will get her in for an appt Tuesday the 16th. Will start her on Medrol has DM type I with an insulin pump and add flexeril.

## 2022-08-16 ENCOUNTER — TELEPHONE (OUTPATIENT)
Dept: PHYSICAL THERAPY | Facility: HOSPITAL | Age: 57
End: 2022-08-16

## 2022-08-16 ENCOUNTER — OFFICE VISIT (OUTPATIENT)
Dept: PHYSICAL MEDICINE AND REHAB | Facility: CLINIC | Age: 57
End: 2022-08-16
Payer: COMMERCIAL

## 2022-08-16 VITALS — OXYGEN SATURATION: 98 % | HEIGHT: 67 IN | HEART RATE: 69 BPM | BODY MASS INDEX: 22.6 KG/M2 | WEIGHT: 144 LBS

## 2022-08-16 DIAGNOSIS — M79.18 MYOFASCIAL PAIN: ICD-10-CM

## 2022-08-16 DIAGNOSIS — M54.2 CERVICALGIA: Primary | ICD-10-CM

## 2022-08-16 DIAGNOSIS — R06.83 SNORING: ICD-10-CM

## 2022-08-16 DIAGNOSIS — G47.00 INSOMNIA, UNSPECIFIED TYPE: ICD-10-CM

## 2022-08-16 PROCEDURE — 3008F BODY MASS INDEX DOCD: CPT | Performed by: PHYSICAL MEDICINE & REHABILITATION

## 2022-08-16 PROCEDURE — 99204 OFFICE O/P NEW MOD 45 MIN: CPT | Performed by: PHYSICAL MEDICINE & REHABILITATION

## 2022-10-10 ENCOUNTER — MOBILE ENCOUNTER (OUTPATIENT)
Dept: ANESTHESIOLOGY | Facility: HOSPITAL | Age: 57
End: 2022-10-10

## 2022-10-10 DIAGNOSIS — E13.9 DIABETES 1.5, MANAGED AS TYPE 1 (HCC): Primary | ICD-10-CM

## 2023-03-30 ENCOUNTER — TELEPHONE (OUTPATIENT)
Facility: CLINIC | Age: 58
End: 2023-03-30

## 2023-03-30 NOTE — TELEPHONE ENCOUNTER
Patient outreach message received:    3 year colonoscopy entered in patient outreach. Patient due on 07/18/23 per Dr. Mirella Theodore. Recall reminder letter mailed out to patient.

## 2023-04-30 ENCOUNTER — MOBILE ENCOUNTER (OUTPATIENT)
Dept: ANESTHESIOLOGY | Facility: HOSPITAL | Age: 58
End: 2023-04-30

## 2023-04-30 DIAGNOSIS — E13.9 DIABETES 1.5, MANAGED AS TYPE 1 (HCC): Primary | ICD-10-CM

## 2023-05-08 ENCOUNTER — MOBILE ENCOUNTER (OUTPATIENT)
Dept: ANESTHESIOLOGY | Facility: HOSPITAL | Age: 58
End: 2023-05-08

## 2023-05-08 ENCOUNTER — LAB ENCOUNTER (OUTPATIENT)
Dept: LAB | Facility: HOSPITAL | Age: 58
End: 2023-05-08
Attending: ANESTHESIOLOGY
Payer: COMMERCIAL

## 2023-05-08 DIAGNOSIS — E13.9 DIABETES 1.5, MANAGED AS TYPE 1 (HCC): Primary | ICD-10-CM

## 2023-05-08 LAB
ANION GAP SERPL CALC-SCNC: 9 MMOL/L (ref 0–18)
BUN BLD-MCNC: 16 MG/DL (ref 7–18)
BUN/CREAT SERPL: 20 (ref 10–20)
CALCIUM BLD-MCNC: 9.4 MG/DL (ref 8.5–10.1)
CHLORIDE SERPL-SCNC: 104 MMOL/L (ref 98–112)
CO2 SERPL-SCNC: 26 MMOL/L (ref 21–32)
CREAT BLD-MCNC: 0.8 MG/DL
EST. AVERAGE GLUCOSE BLD GHB EST-MCNC: 120 MG/DL (ref 68–126)
FASTING STATUS PATIENT QL REPORTED: NO
GFR SERPLBLD BASED ON 1.73 SQ M-ARVRAT: 86 ML/MIN/1.73M2 (ref 60–?)
GLUCOSE BLD-MCNC: 92 MG/DL (ref 70–99)
HBA1C MFR BLD: 5.8 % (ref ?–5.7)
OSMOLALITY SERPL CALC.SUM OF ELEC: 289 MOSM/KG (ref 275–295)
POTASSIUM SERPL-SCNC: 3.7 MMOL/L (ref 3.5–5.1)
SODIUM SERPL-SCNC: 139 MMOL/L (ref 136–145)

## 2023-05-08 PROCEDURE — 83036 HEMOGLOBIN GLYCOSYLATED A1C: CPT | Performed by: ANESTHESIOLOGY

## 2023-05-08 PROCEDURE — 80048 BASIC METABOLIC PNL TOTAL CA: CPT | Performed by: ANESTHESIOLOGY

## 2023-05-08 PROCEDURE — 36415 COLL VENOUS BLD VENIPUNCTURE: CPT | Performed by: ANESTHESIOLOGY

## 2023-06-05 ENCOUNTER — MOBILE ENCOUNTER (OUTPATIENT)
Dept: ANESTHESIOLOGY | Facility: HOSPITAL | Age: 58
End: 2023-06-05

## 2023-06-05 DIAGNOSIS — E13.9 DIABETES 1.5, MANAGED AS TYPE 1 (HCC): Primary | ICD-10-CM

## 2023-07-21 ENCOUNTER — MOBILE ENCOUNTER (OUTPATIENT)
Dept: ANESTHESIOLOGY | Facility: HOSPITAL | Age: 58
End: 2023-07-21

## 2023-07-21 DIAGNOSIS — E13.9 DIABETES 1.5, MANAGED AS TYPE 1 (HCC): Primary | ICD-10-CM

## 2023-07-22 ENCOUNTER — MOBILE ENCOUNTER (OUTPATIENT)
Dept: PAIN CLINIC | Facility: HOSPITAL | Age: 58
End: 2023-07-22

## 2023-07-22 DIAGNOSIS — E11.9 CONTROLLED TYPE 2 DIABETES MELLITUS WITHOUT COMPLICATION, WITHOUT LONG-TERM CURRENT USE OF INSULIN (HCC): Primary | ICD-10-CM

## 2023-07-23 ENCOUNTER — LAB ENCOUNTER (OUTPATIENT)
Dept: LAB | Facility: HOSPITAL | Age: 58
End: 2023-07-23
Attending: ANESTHESIOLOGY
Payer: COMMERCIAL

## 2023-07-23 DIAGNOSIS — E11.9 CONTROLLED TYPE 2 DIABETES MELLITUS WITHOUT COMPLICATION, WITHOUT LONG-TERM CURRENT USE OF INSULIN (HCC): ICD-10-CM

## 2023-07-23 LAB
EST. AVERAGE GLUCOSE BLD GHB EST-MCNC: 120 MG/DL (ref 68–126)
HBA1C MFR BLD: 5.8 % (ref ?–5.7)

## 2023-07-23 PROCEDURE — 83036 HEMOGLOBIN GLYCOSYLATED A1C: CPT

## 2023-07-23 PROCEDURE — 80307 DRUG TEST PRSMV CHEM ANLYZR: CPT

## 2023-07-23 PROCEDURE — 36415 COLL VENOUS BLD VENIPUNCTURE: CPT

## 2023-08-02 LAB
7-AMINOCLONAZ CONF BLD: NEGATIVE NG/ML
ALPRAZOLAM CONF BLD: NEGATIVE NG/ML
BENZO CLASS BLD: POSITIVE
CHLORDIAZEPOX CONF BLD: NEGATIVE
CLONAZEPAM CONF BLD: NEGATIVE NG/ML
DESALKYLFLURAZ CONF BLD: NEGATIVE NG/ML
DESMETHYLDIAZ CONF BLD: 430 NG/ML
DIAZEPAM CONF BLD: 225 NG/ML
FLURAZEPAM CONF BLD: NEGATIVE NG/ML
LORAZEPAM CONF BLD: NEGATIVE NG/ML
Lab: NEGATIVE
MIDAZOLAM CONF BLD: NEGATIVE NG/ML
OXAZEPAM CONF BLD: NEGATIVE NG/ML
TEMAZEPAM CONF BLD: NEGATIVE NG/ML
TRIAZOLAM CONF BLD: NEGATIVE NG/ML

## 2023-08-11 LAB
AMPHET IA BLD: NEGATIVE NG/ML
BARBIT IA BLD: NEGATIVE UG/ML
CANNABIDIOL BLD: NEGATIVE NG/ML
CARBOXY-THC BLD: 32 NG/ML
COCAINE MET IA BLD: NEGATIVE NG/ML
HYDROXY-THC BLD: NEGATIVE NG/ML
METHADONE IA BLD: NEGATIVE NG/ML
OPIATES IA BLD: NEGATIVE NG/ML
OXYCODONE IA BLD: NEGATIVE NG/ML
PHENCYCLIDINE IA BLD: NEGATIVE NG/ML
PROPOXY IA BLD: NEGATIVE NG/ML
THC CLASS BLD: POSITIVE
THC CONF BLD: 1.3 NG/ML

## 2023-12-29 ENCOUNTER — MOBILE ENCOUNTER (OUTPATIENT)
Dept: ANESTHESIOLOGY | Facility: HOSPITAL | Age: 58
End: 2023-12-29

## 2023-12-29 DIAGNOSIS — E13.9 DIABETES 1.5, MANAGED AS TYPE 1 (HCC): Primary | ICD-10-CM

## 2023-12-30 ENCOUNTER — APPOINTMENT (OUTPATIENT)
Dept: GENERAL RADIOLOGY | Facility: HOSPITAL | Age: 58
End: 2023-12-30
Attending: EMERGENCY MEDICINE
Payer: COMMERCIAL

## 2023-12-30 ENCOUNTER — APPOINTMENT (OUTPATIENT)
Dept: CT IMAGING | Facility: HOSPITAL | Age: 58
End: 2023-12-30
Attending: EMERGENCY MEDICINE
Payer: COMMERCIAL

## 2023-12-30 ENCOUNTER — HOSPITAL ENCOUNTER (INPATIENT)
Facility: HOSPITAL | Age: 58
LOS: 1 days | Discharge: HOME | End: 2023-12-31
Attending: EMERGENCY MEDICINE | Admitting: HOSPITALIST
Payer: COMMERCIAL

## 2023-12-30 ENCOUNTER — APPOINTMENT (OUTPATIENT)
Dept: CV DIAGNOSTICS | Facility: HOSPITAL | Age: 58
End: 2023-12-30
Attending: INTERNAL MEDICINE
Payer: COMMERCIAL

## 2023-12-30 ENCOUNTER — LAB ENCOUNTER (OUTPATIENT)
Dept: LAB | Facility: HOSPITAL | Age: 58
End: 2023-12-30
Attending: ANESTHESIOLOGY
Payer: COMMERCIAL

## 2023-12-30 ENCOUNTER — MOBILE ENCOUNTER (OUTPATIENT)
Dept: ANESTHESIOLOGY | Facility: HOSPITAL | Age: 58
End: 2023-12-30

## 2023-12-30 DIAGNOSIS — E87.1 HYPONATREMIA: Primary | ICD-10-CM

## 2023-12-30 DIAGNOSIS — I10 ESSENTIAL HYPERTENSION: ICD-10-CM

## 2023-12-30 DIAGNOSIS — E13.9 DIABETES 1.5, MANAGED AS TYPE 1 (HCC): Primary | ICD-10-CM

## 2023-12-30 DIAGNOSIS — E13.9 DIABETES 1.5, MANAGED AS TYPE 1 (HCC): ICD-10-CM

## 2023-12-30 LAB
ALBUMIN SERPL-MCNC: 4.2 G/DL (ref 3.2–4.8)
ALBUMIN SERPL-MCNC: 4.3 G/DL (ref 3.2–4.8)
ALP LIVER SERPL-CCNC: 69 U/L
ALP LIVER SERPL-CCNC: 72 U/L
ALT SERPL-CCNC: 18 U/L
ALT SERPL-CCNC: 19 U/L
ANION GAP SERPL CALC-SCNC: 6 MMOL/L (ref 0–18)
ANION GAP SERPL CALC-SCNC: 7 MMOL/L (ref 0–18)
AST SERPL-CCNC: 24 U/L (ref ?–34)
AST SERPL-CCNC: 24 U/L (ref ?–34)
BASOPHILS # BLD AUTO: 0.11 X10(3) UL (ref 0–0.2)
BASOPHILS NFR BLD AUTO: 1.4 %
BILIRUB DIRECT SERPL-MCNC: 0.1 MG/DL (ref ?–0.3)
BILIRUB DIRECT SERPL-MCNC: 0.1 MG/DL (ref ?–0.3)
BILIRUB SERPL-MCNC: 0.3 MG/DL (ref 0.3–1.2)
BILIRUB SERPL-MCNC: 0.4 MG/DL (ref 0.3–1.2)
BILIRUB UR QL: NEGATIVE
BNP SERPL-MCNC: 104 PG/ML
BNP SERPL-MCNC: 135 PG/ML
BUN BLD-MCNC: 16 MG/DL (ref 9–23)
BUN BLD-MCNC: 17 MG/DL (ref 9–23)
BUN/CREAT SERPL: 21 (ref 10–20)
BUN/CREAT SERPL: 21.3 (ref 10–20)
CALCIUM BLD-MCNC: 9 MG/DL (ref 8.7–10.4)
CALCIUM BLD-MCNC: 9.2 MG/DL (ref 8.7–10.4)
CHLORIDE SERPL-SCNC: 90 MMOL/L (ref 98–112)
CHLORIDE SERPL-SCNC: 90 MMOL/L (ref 98–112)
CLARITY UR: CLEAR
CO2 SERPL-SCNC: 27 MMOL/L (ref 21–32)
CO2 SERPL-SCNC: 28 MMOL/L (ref 21–32)
CREAT BLD-MCNC: 0.75 MG/DL
CREAT BLD-MCNC: 0.81 MG/DL
CREAT UR-SCNC: 79.4 MG/DL
DEPRECATED RDW RBC AUTO: 48 FL (ref 35.1–46.3)
DEPRECATED RDW RBC AUTO: 49.2 FL (ref 35.1–46.3)
EGFRCR SERPLBLD CKD-EPI 2021: 84 ML/MIN/1.73M2 (ref 60–?)
EGFRCR SERPLBLD CKD-EPI 2021: 92 ML/MIN/1.73M2 (ref 60–?)
EOSINOPHIL # BLD AUTO: 0.3 X10(3) UL (ref 0–0.7)
EOSINOPHIL NFR BLD AUTO: 3.8 %
ERYTHROCYTE [DISTWIDTH] IN BLOOD BY AUTOMATED COUNT: 15.3 % (ref 11–15)
ERYTHROCYTE [DISTWIDTH] IN BLOOD BY AUTOMATED COUNT: 15.7 % (ref 11–15)
EST. AVERAGE GLUCOSE BLD GHB EST-MCNC: 126 MG/DL (ref 68–126)
FASTING STATUS PATIENT QL REPORTED: YES
GLUCOSE BLD-MCNC: 92 MG/DL (ref 70–99)
GLUCOSE BLD-MCNC: 92 MG/DL (ref 70–99)
GLUCOSE BLDC GLUCOMTR-MCNC: 142 MG/DL (ref 70–99)
GLUCOSE BLDC GLUCOMTR-MCNC: 99 MG/DL (ref 70–99)
GLUCOSE UR-MCNC: NORMAL MG/DL
HBA1C MFR BLD: 6 % (ref ?–5.7)
HCT VFR BLD AUTO: 31.4 %
HCT VFR BLD AUTO: 32.3 %
HGB BLD-MCNC: 11 G/DL
HGB BLD-MCNC: 11.1 G/DL
IMM GRANULOCYTES # BLD AUTO: 0.03 X10(3) UL (ref 0–1)
IMM GRANULOCYTES NFR BLD: 0.4 %
KETONES UR-MCNC: 20 MG/DL
LEUKOCYTE ESTERASE UR QL STRIP.AUTO: NEGATIVE
LYMPHOCYTES # BLD AUTO: 3.58 X10(3) UL (ref 1–4)
LYMPHOCYTES NFR BLD AUTO: 45.3 %
MAGNESIUM SERPL-MCNC: 1.7 MG/DL (ref 1.6–2.6)
MCH RBC QN AUTO: 29.5 PG (ref 26–34)
MCH RBC QN AUTO: 30.2 PG (ref 26–34)
MCHC RBC AUTO-ENTMCNC: 34.1 G/DL (ref 31–37)
MCHC RBC AUTO-ENTMCNC: 35.4 G/DL (ref 31–37)
MCV RBC AUTO: 85.3 FL
MCV RBC AUTO: 86.6 FL
MONOCYTES # BLD AUTO: 1.08 X10(3) UL (ref 0.1–1)
MONOCYTES NFR BLD AUTO: 13.7 %
NEUTROPHILS # BLD AUTO: 2.81 X10 (3) UL (ref 1.5–7.7)
NEUTROPHILS # BLD AUTO: 2.81 X10(3) UL (ref 1.5–7.7)
NEUTROPHILS NFR BLD AUTO: 35.4 %
NITRITE UR QL STRIP.AUTO: NEGATIVE
OSMOLALITY SERPL CALC.SUM OF ELEC: 259 MOSM/KG (ref 275–295)
OSMOLALITY SERPL CALC.SUM OF ELEC: 259 MOSM/KG (ref 275–295)
OSMOLALITY UR: 571 MOSM/KG (ref 300–1100)
PH UR: 6.5 [PH] (ref 5–8)
PLATELET # BLD AUTO: 426 10(3)UL (ref 150–450)
PLATELET # BLD AUTO: 440 10(3)UL (ref 150–450)
POTASSIUM SERPL-SCNC: 3.5 MMOL/L (ref 3.5–5.1)
POTASSIUM SERPL-SCNC: 3.7 MMOL/L (ref 3.5–5.1)
PROT SERPL-MCNC: 7 G/DL (ref 5.7–8.2)
PROT SERPL-MCNC: 7.4 G/DL (ref 5.7–8.2)
PROT UR-MCNC: NEGATIVE MG/DL
RBC # BLD AUTO: 3.68 X10(6)UL
RBC # BLD AUTO: 3.73 X10(6)UL
SODIUM SERPL-SCNC: 124 MMOL/L (ref 136–145)
SODIUM SERPL-SCNC: 124 MMOL/L (ref 136–145)
SODIUM SERPL-SCNC: 85 MMOL/L
SP GR UR STRIP: 1.02 (ref 1–1.03)
T4 FREE SERPL-MCNC: 1 NG/DL (ref 0.8–1.7)
TROPONIN I SERPL HS-MCNC: 3 NG/L
TSI SER-ACNC: 1.72 MIU/ML (ref 0.55–4.78)
TSI SER-ACNC: 1.96 MIU/ML (ref 0.55–4.78)
UROBILINOGEN UR STRIP-ACNC: NORMAL
VALPROATE SERPL-MCNC: 106.2 UG/ML (ref 50–100)
WBC # BLD AUTO: 7.9 X10(3) UL (ref 4–11)
WBC # BLD AUTO: 8.2 X10(3) UL (ref 4–11)

## 2023-12-30 PROCEDURE — 84443 ASSAY THYROID STIM HORMONE: CPT

## 2023-12-30 PROCEDURE — 83036 HEMOGLOBIN GLYCOSYLATED A1C: CPT

## 2023-12-30 PROCEDURE — 84439 ASSAY OF FREE THYROXINE: CPT

## 2023-12-30 PROCEDURE — 85027 COMPLETE CBC AUTOMATED: CPT

## 2023-12-30 PROCEDURE — 80076 HEPATIC FUNCTION PANEL: CPT

## 2023-12-30 PROCEDURE — 36415 COLL VENOUS BLD VENIPUNCTURE: CPT

## 2023-12-30 PROCEDURE — 99255 IP/OBS CONSLTJ NEW/EST HI 80: CPT | Performed by: INTERNAL MEDICINE

## 2023-12-30 PROCEDURE — 80048 BASIC METABOLIC PNL TOTAL CA: CPT

## 2023-12-30 PROCEDURE — 71045 X-RAY EXAM CHEST 1 VIEW: CPT | Performed by: EMERGENCY MEDICINE

## 2023-12-30 PROCEDURE — 70450 CT HEAD/BRAIN W/O DYE: CPT | Performed by: EMERGENCY MEDICINE

## 2023-12-30 PROCEDURE — 83880 ASSAY OF NATRIURETIC PEPTIDE: CPT

## 2023-12-30 PROCEDURE — 80164 ASSAY DIPROPYLACETIC ACD TOT: CPT

## 2023-12-30 PROCEDURE — 99223 1ST HOSP IP/OBS HIGH 75: CPT | Performed by: INTERNAL MEDICINE

## 2023-12-30 PROCEDURE — 93306 TTE W/DOPPLER COMPLETE: CPT | Performed by: INTERNAL MEDICINE

## 2023-12-30 RX ORDER — LISINOPRIL 10 MG/1
10 TABLET ORAL 2 TIMES DAILY
COMMUNITY

## 2023-12-30 RX ORDER — ACETAMINOPHEN 500 MG
500 TABLET ORAL EVERY 4 HOURS PRN
Status: DISCONTINUED | OUTPATIENT
Start: 2023-12-30 | End: 2023-12-31

## 2023-12-30 RX ORDER — HYDROCODONE BITARTRATE AND ACETAMINOPHEN 5; 325 MG/1; MG/1
2 TABLET ORAL EVERY 4 HOURS PRN
Status: DISCONTINUED | OUTPATIENT
Start: 2023-12-30 | End: 2023-12-31

## 2023-12-30 RX ORDER — MAGNESIUM OXIDE 400 MG/1
400 TABLET ORAL ONCE
Status: COMPLETED | OUTPATIENT
Start: 2023-12-30 | End: 2023-12-30

## 2023-12-30 RX ORDER — IBUPROFEN 800 MG/1
800 TABLET ORAL 2 TIMES DAILY PRN
COMMUNITY

## 2023-12-30 RX ORDER — DILTIAZEM HYDROCHLORIDE 180 MG/1
180 CAPSULE, EXTENDED RELEASE ORAL DAILY
Status: DISCONTINUED | OUTPATIENT
Start: 2023-12-31 | End: 2023-12-30

## 2023-12-30 RX ORDER — DIVALPROEX SODIUM 500 MG/1
500 TABLET, EXTENDED RELEASE ORAL NIGHTLY PRN
COMMUNITY

## 2023-12-30 RX ORDER — DIVALPROEX SODIUM 500 MG/1
500 TABLET, EXTENDED RELEASE ORAL NIGHTLY
Status: DISCONTINUED | OUTPATIENT
Start: 2023-12-30 | End: 2023-12-31

## 2023-12-30 RX ORDER — PANTOPRAZOLE SODIUM 20 MG/1
20 TABLET, DELAYED RELEASE ORAL
Status: DISCONTINUED | OUTPATIENT
Start: 2023-12-30 | End: 2023-12-31

## 2023-12-30 RX ORDER — SODIUM CHLORIDE 9 MG/ML
INJECTION, SOLUTION INTRAVENOUS CONTINUOUS
Status: DISCONTINUED | OUTPATIENT
Start: 2023-12-30 | End: 2023-12-31

## 2023-12-30 RX ORDER — HYDROCODONE BITARTRATE AND ACETAMINOPHEN 5; 325 MG/1; MG/1
1 TABLET ORAL EVERY 4 HOURS PRN
Status: DISCONTINUED | OUTPATIENT
Start: 2023-12-30 | End: 2023-12-31

## 2023-12-30 RX ORDER — ACETAMINOPHEN 325 MG/1
650 TABLET ORAL EVERY 4 HOURS PRN
Status: DISCONTINUED | OUTPATIENT
Start: 2023-12-30 | End: 2023-12-31

## 2023-12-30 RX ORDER — TEMAZEPAM 15 MG/1
15 CAPSULE ORAL NIGHTLY PRN
Status: DISCONTINUED | OUTPATIENT
Start: 2023-12-30 | End: 2023-12-31

## 2023-12-30 RX ORDER — ACETAMINOPHEN 500 MG
1000 TABLET ORAL 2 TIMES DAILY PRN
COMMUNITY

## 2023-12-30 RX ORDER — LISINOPRIL 10 MG/1
10 TABLET ORAL 2 TIMES DAILY
Status: DISCONTINUED | OUTPATIENT
Start: 2023-12-30 | End: 2023-12-31

## 2023-12-30 RX ORDER — ONDANSETRON 2 MG/ML
4 INJECTION INTRAMUSCULAR; INTRAVENOUS EVERY 6 HOURS PRN
Status: DISCONTINUED | OUTPATIENT
Start: 2023-12-30 | End: 2023-12-31

## 2023-12-30 RX ORDER — ENOXAPARIN SODIUM 100 MG/ML
40 INJECTION SUBCUTANEOUS DAILY
Status: DISCONTINUED | OUTPATIENT
Start: 2023-12-30 | End: 2023-12-31

## 2023-12-30 RX ORDER — DILTIAZEM HYDROCHLORIDE 180 MG/1
180 CAPSULE, EXTENDED RELEASE ORAL 2 TIMES DAILY
Status: DISCONTINUED | OUTPATIENT
Start: 2023-12-30 | End: 2023-12-31

## 2023-12-30 RX ORDER — MAGNESIUM SULFATE HEPTAHYDRATE 40 MG/ML
2 INJECTION, SOLUTION INTRAVENOUS ONCE
Status: COMPLETED | OUTPATIENT
Start: 2023-12-30 | End: 2023-12-30

## 2023-12-30 NOTE — ED INITIAL ASSESSMENT (HPI)
Patient arrives to ER evaluation of swelling in lower legs. Patient states some brain fog.  Patient had some labs drawn with low sodium

## 2023-12-30 NOTE — ED QUICK NOTES
Orders for admission, patient is aware of plan and ready to go upstairs. Any questions, please call ED RN Susy Carrera at extension [phone].      Patient Covid vaccination status: Partially vaccinated     COVID Test Ordered in ED: None    COVID Suspicion at Admission: N/A    Running Infusions:  None    Mental Status/LOC at time of transport: A&Ox4    Other pertinent information:   CIWA score: N/A   NIH score:  N/A

## 2023-12-31 ENCOUNTER — APPOINTMENT (OUTPATIENT)
Dept: ULTRASOUND IMAGING | Facility: HOSPITAL | Age: 58
End: 2023-12-31
Attending: INTERNAL MEDICINE
Payer: COMMERCIAL

## 2023-12-31 VITALS
HEIGHT: 67 IN | SYSTOLIC BLOOD PRESSURE: 103 MMHG | DIASTOLIC BLOOD PRESSURE: 70 MMHG | TEMPERATURE: 98 F | HEART RATE: 73 BPM | WEIGHT: 163.5 LBS | BODY MASS INDEX: 25.66 KG/M2 | RESPIRATION RATE: 18 BRPM | OXYGEN SATURATION: 97 %

## 2023-12-31 LAB
ANION GAP SERPL CALC-SCNC: 4 MMOL/L (ref 0–18)
ANION GAP SERPL CALC-SCNC: 7 MMOL/L (ref 0–18)
ATRIAL RATE: 68 BPM
BASOPHILS # BLD AUTO: 0.08 X10(3) UL (ref 0–0.2)
BASOPHILS NFR BLD AUTO: 1.2 %
BUN BLD-MCNC: 16 MG/DL (ref 9–23)
BUN BLD-MCNC: 16 MG/DL (ref 9–23)
BUN/CREAT SERPL: 17.2 (ref 10–20)
BUN/CREAT SERPL: 18.2 (ref 10–20)
CALCIUM BLD-MCNC: 9 MG/DL (ref 8.7–10.4)
CALCIUM BLD-MCNC: 9.1 MG/DL (ref 8.7–10.4)
CHLORIDE SERPL-SCNC: 95 MMOL/L (ref 98–112)
CHLORIDE SERPL-SCNC: 97 MMOL/L (ref 98–112)
CO2 SERPL-SCNC: 27 MMOL/L (ref 21–32)
CO2 SERPL-SCNC: 29 MMOL/L (ref 21–32)
CREAT BLD-MCNC: 0.88 MG/DL
CREAT BLD-MCNC: 0.93 MG/DL
DEPRECATED RDW RBC AUTO: 49.3 FL (ref 35.1–46.3)
EGFRCR SERPLBLD CKD-EPI 2021: 71 ML/MIN/1.73M2 (ref 60–?)
EGFRCR SERPLBLD CKD-EPI 2021: 76 ML/MIN/1.73M2 (ref 60–?)
EOSINOPHIL # BLD AUTO: 0.17 X10(3) UL (ref 0–0.7)
EOSINOPHIL NFR BLD AUTO: 2.6 %
ERYTHROCYTE [DISTWIDTH] IN BLOOD BY AUTOMATED COUNT: 15.6 % (ref 11–15)
GLUCOSE BLD-MCNC: 132 MG/DL (ref 70–99)
GLUCOSE BLD-MCNC: 168 MG/DL (ref 70–99)
GLUCOSE BLDC GLUCOMTR-MCNC: 118 MG/DL (ref 70–99)
HCT VFR BLD AUTO: 30.6 %
HGB BLD-MCNC: 10.7 G/DL
IMM GRANULOCYTES # BLD AUTO: 0.01 X10(3) UL (ref 0–1)
IMM GRANULOCYTES NFR BLD: 0.2 %
LYMPHOCYTES # BLD AUTO: 2.89 X10(3) UL (ref 1–4)
LYMPHOCYTES NFR BLD AUTO: 44.5 %
MAGNESIUM SERPL-MCNC: 2.4 MG/DL (ref 1.6–2.6)
MCH RBC QN AUTO: 30.1 PG (ref 26–34)
MCHC RBC AUTO-ENTMCNC: 35 G/DL (ref 31–37)
MCV RBC AUTO: 86.2 FL
MONOCYTES # BLD AUTO: 1.47 X10(3) UL (ref 0.1–1)
MONOCYTES NFR BLD AUTO: 22.7 %
NEUTROPHILS # BLD AUTO: 1.87 X10 (3) UL (ref 1.5–7.7)
NEUTROPHILS # BLD AUTO: 1.87 X10(3) UL (ref 1.5–7.7)
NEUTROPHILS NFR BLD AUTO: 28.8 %
OSMOLALITY SERPL CALC.SUM OF ELEC: 273 MOSM/KG (ref 275–295)
OSMOLALITY SERPL CALC.SUM OF ELEC: 273 MOSM/KG (ref 275–295)
OSMOLALITY UR: 492 MOSM/KG (ref 300–1100)
P AXIS: 57 DEGREES
P-R INTERVAL: 162 MS
PLATELET # BLD AUTO: 406 10(3)UL (ref 150–450)
POTASSIUM SERPL-SCNC: 3.4 MMOL/L (ref 3.5–5.1)
POTASSIUM SERPL-SCNC: 4.5 MMOL/L (ref 3.5–5.1)
Q-T INTERVAL: 412 MS
QRS DURATION: 82 MS
QTC CALCULATION (BEZET): 438 MS
R AXIS: -1 DEGREES
RBC # BLD AUTO: 3.55 X10(6)UL
SODIUM SERPL-SCNC: 129 MMOL/L (ref 136–145)
SODIUM SERPL-SCNC: 130 MMOL/L (ref 136–145)
SODIUM SERPL-SCNC: 25 MMOL/L
T AXIS: 35 DEGREES
VENTRICULAR RATE: 68 BPM
WBC # BLD AUTO: 6.5 X10(3) UL (ref 4–11)

## 2023-12-31 PROCEDURE — 93970 EXTREMITY STUDY: CPT | Performed by: INTERNAL MEDICINE

## 2023-12-31 PROCEDURE — 99239 HOSP IP/OBS DSCHRG MGMT >30: CPT | Performed by: HOSPITALIST

## 2023-12-31 RX ORDER — MAGNESIUM OXIDE 400 MG/1
400 TABLET ORAL ONCE
Status: COMPLETED | OUTPATIENT
Start: 2023-12-31 | End: 2023-12-31

## 2023-12-31 RX ORDER — NICOTINE POLACRILEX 4 MG
15 LOZENGE BUCCAL
Status: DISCONTINUED | OUTPATIENT
Start: 2023-12-31 | End: 2023-12-31

## 2023-12-31 RX ORDER — NICOTINE POLACRILEX 4 MG
30 LOZENGE BUCCAL
Status: DISCONTINUED | OUTPATIENT
Start: 2023-12-31 | End: 2023-12-31

## 2023-12-31 RX ORDER — POTASSIUM CHLORIDE 20 MEQ/1
40 TABLET, EXTENDED RELEASE ORAL ONCE
Status: COMPLETED | OUTPATIENT
Start: 2023-12-31 | End: 2023-12-31

## 2023-12-31 RX ORDER — DEXTROSE MONOHYDRATE 25 G/50ML
50 INJECTION, SOLUTION INTRAVENOUS
Status: DISCONTINUED | OUTPATIENT
Start: 2023-12-31 | End: 2023-12-31

## 2023-12-31 NOTE — DISCHARGE INSTRUCTIONS
Follow-up with PCP in 1-2 weeks  Follow-up with Dr. Marlene Figueroa in one week, check blood work 1 day before follow-up   Follow-up with Dr. Hilary Redman as discussed

## 2023-12-31 NOTE — PLAN OF CARE
Rec'd order for discharge, saline lock and telemetry monitor removed. Patient given discharge instructions including when to take next doses. Patient to follow up with her MD's. All questions answered, patient discharged in good condition.   Problem: Patient Centered Care  Goal: Patient preferences are identified and integrated in the patient's plan of care  Description: Interventions:  - What would you like us to know as we care for you?   - Provide timely, complete, and accurate information to patient/family  - Incorporate patient and family knowledge, values, beliefs, and cultural backgrounds into the planning and delivery of care  - Encourage patient/family to participate in care and decision-making at the level they choose  - Honor patient and family perspectives and choices  Outcome: Adequate for Discharge     Problem: Diabetes/Glucose Control  Goal: Glucose maintained within prescribed range  Description: INTERVENTIONS:  - Monitor Blood Glucose as ordered  - Assess for signs and symptoms of hyperglycemia and hypoglycemia  - Administer ordered medications to maintain glucose within target range  - Assess barriers to adequate nutritional intake and initiate nutrition consult as needed  - Instruct patient on self management of diabetes  Outcome: Adequate for Discharge     Problem: Patient/Family Goals  Goal: Patient/Family Long Term Goal  Description: Patient's Long Term Goal:     Interventions:  -   - See additional Care Plan goals for specific interventions  Outcome: Adequate for Discharge  Goal: Patient/Family Short Term Goal  Description: Patient's Short Term Goal:     Interventions:   -   - See additional Care Plan goals for specific interventions  Outcome: Adequate for Discharge

## 2023-12-31 NOTE — DISCHARGE SUMMARY
[facility]    Discharge Summary    [address] Patient Status:  Inpatient    1965 MRN [id number]   Location [facility] 1W Attending David Carrion MD   Hosp Day # 1 PCP Patrick Iqbal MD     Date of Admission: 2023 Disposition: Home or Self Care     Date of Discharge: 23     Admitting Diagnosis: Hyponatremia [E87.1]    Hospital Discharge Diagnoses:  Hyponatremia    Lace+ Score: 57  59-90 High Risk  29-58 Medium Risk  0-28   Low Risk. TCM Follow-Up Recommendation:  LACE < 29: Low Risk of readmission after discharge from the hospital. No TCM follow-up needed.       Problem List:   Patient Active Problem List   Diagnosis    Controlled type 2 diabetes mellitus without complication, without long-term current use of insulin (HCC)    Myopia    Viral warts    Chest pain, atypical    Hypokalemia    Metabolic alkalosis    Dyspnea on exertion    Perioral cyanosis    Acute chest pain    Elevated immune protein in blood    Muscular pain    Chronic bilateral thoracic back pain    Neutrophilic leukocytosis    Thrombocytosis    Abnormal mammogram of right breast    Malignant neoplasm of upper-outer quadrant of right breast in female, estrogen receptor positive  (HCC)    Essential hypertension    Insulin dependent type 2 diabetes mellitus (HCC)    Absence of breast, bilateral    Absence of both breasts    Breast abscess    Dehydration    Anorexia    COVID-19    Bleeding from wound    Breast hematoma    Hyponatremia    Constipation, unspecified constipation type    Abdominal pain, acute    Cervicalgia    Myofascial pain    Snoring    Insomnia       Reason for Admission:   Hyponatremia  Type 1 diabetes mellitus  HTN  History of breast CA  History of Hodgkin's lymphoma  Pancreatic insufficiency  Gastroparesis    Physical Exam:   General appearance: alert, appears stated age and cooperative  Pulmonary:  clear to auscultation bilaterally  Cardiovascular: S1, S2 normal, no murmur, click, rub or gallop, regular rate and rhythm  Abdominal: soft, non-tender; bowel sounds normal; no masses,  no organomegaly  Extremities: extremities normal, atraumatic, no cyanosis or edema  Psychiatric: calm      History of Present Illness:   Per Dr. Anneliese Mills  This is a 62year oldfemale who is sent in by family after finding abnormal laboratory testing as outpatient and having concerns for continued decline and change in mental status. Patient describes worse lower extremity swelling for the past 5 days or so. She states she has been taking increased doses of hydrochlorothiazide for the past 3 days to combat this edema. Patient had laboratory values drawn as outpatient which noted low sodium levels prompting visit to ED. At time of interview, patient denied shortness of breath, chest pain, abdominal pain, nausea or vomiting. Patient describes some generalized feeling of unwell, but unable to tolerate further. Patient denies subjective fevers or chills or recent sick contacts.      Hospital Course:    Hyponatremia  -Patient presenting with mild confusion, lower extremity edema  -Noted to have sodium levels of 124.  -Patient reported recent use of hydrochlorothiazide and increased dosing of Depakote.  -stopping hydrochlorothiazide and Depakote  -Nephrology consulted, appreciate recommendations, sodium improved to 130  -repeat labs in am and follow-up with Dr. Tianna Grayson in 1 week    Confusion  -improved  -likely related to elevated depakote level (106) and hyponatremia  -CT scan with ch. Microvascular disease and mild atrophy  -d/w family mental status improved, recomment, follow-up with psychiatry for depakote dosing, follow-up and monitor sodium, minimize episodes of hypoglycemia.  -if still with concern for confusion once above factors improved in ~3 months then would pursue further w/u which would include mri brain and      Type 1 diabetes mellitus  -Secondary to pancreatic insufficiency  - resume home insulin pump  -d/w dheanna, given hba1c 6, and episodes of hypoglycemia, would recommend liberalizing her diet  -Endocrinology on consult, appreciate recommendations     HTN  - controlled  -Avoiding hydrochlorothiazide due to hyponatremia as above  -Restart lisinopril  - Monitor and adjust as needed     Other problems  History of breast CA  History of Hodgkin's lymphoma  Pancreatic insufficiency  Gastroparesis     Consultations:   Cardiology  Nephrology  Endocrinology    Procedures: n/a    Complications: n/a    Discharge Condition: Good    Discharge Medications:      Discharge Medications        CONTINUE taking these medications        Instructions Prescription details   acetaminophen 500 MG Tabs  Commonly known as: Tylenol Extra Strength      Take 2 tablets (1,000 mg total) by mouth 2 (two) times daily as needed for Pain. Refills: 0     Depakote  MG Tb24  Generic drug: divalproex ER      Take 1 tablet (500 mg total) by mouth nightly as needed. Refills: 0     dilTIAZem HCl ER Coated Beads 180 MG Cp24  Commonly known as: Cartia XT      Take 1 capsule (180 mg total) by mouth daily. Quantity: 60 capsule  Refills: 3     ibuprofen 800 MG Tabs  Commonly known as: Motrin      Take 1 tablet (800 mg total) by mouth 2 (two) times daily as needed for Pain. Refills: 0     insulin glargine 100 UNIT/ML Soln  Commonly known as: Lantus      Inject into the skin as needed. Refills: 0     lisinopril 10 MG Tabs  Commonly known as: Zestril      Take 1 tablet (10 mg total) by mouth in the morning and 1 tablet (10 mg total) before bedtime. Refills: 0     omeprazole 20 MG Cpdr  Commonly known as: PriLOSEC      Take 1 capsule (20 mg total) by mouth 2 (two) times daily before meals. Refills: 0              Follow up Visits:  Follow-up with PCP in 1 week    Follow up Labs: BMP     Other Discharge Instructions: FOLLOW-UP WITH DR. Ambar Simmons MD  12/31/2023  11:41 AM    > 35 min

## 2024-01-02 ENCOUNTER — LAB ENCOUNTER (OUTPATIENT)
Dept: LAB | Facility: HOSPITAL | Age: 59
End: 2024-01-02
Attending: HOSPITALIST
Payer: COMMERCIAL

## 2024-01-02 DIAGNOSIS — E87.1 HYPONATREMIA: ICD-10-CM

## 2024-01-02 LAB
ALBUMIN SERPL-MCNC: 4.8 G/DL (ref 3.2–4.8)
ANION GAP SERPL CALC-SCNC: 7 MMOL/L (ref 0–18)
BUN BLD-MCNC: 16 MG/DL (ref 9–23)
BUN/CREAT SERPL: 20.8 (ref 10–20)
CALCIUM BLD-MCNC: 9.6 MG/DL (ref 8.7–10.4)
CHLORIDE SERPL-SCNC: 106 MMOL/L (ref 98–112)
CO2 SERPL-SCNC: 27 MMOL/L (ref 21–32)
CREAT BLD-MCNC: 0.77 MG/DL
EGFRCR SERPLBLD CKD-EPI 2021: 89 ML/MIN/1.73M2 (ref 60–?)
GLUCOSE BLD-MCNC: 82 MG/DL (ref 70–99)
OSMOLALITY SERPL CALC.SUM OF ELEC: 290 MOSM/KG (ref 275–295)
PHOSPHATE SERPL-MCNC: 3.8 MG/DL (ref 2.4–5.1)
POTASSIUM SERPL-SCNC: 4.2 MMOL/L (ref 3.5–5.1)
SODIUM SERPL-SCNC: 140 MMOL/L (ref 136–145)

## 2024-01-02 PROCEDURE — 80069 RENAL FUNCTION PANEL: CPT

## 2024-01-02 PROCEDURE — 36415 COLL VENOUS BLD VENIPUNCTURE: CPT

## 2024-01-03 ENCOUNTER — TELEPHONE (OUTPATIENT)
Dept: NEPHROLOGY | Facility: CLINIC | Age: 59
End: 2024-01-03

## 2024-01-03 RX ORDER — FUROSEMIDE 20 MG/1
20 TABLET ORAL DAILY
Qty: 30 TABLET | Refills: 1 | Status: SHIPPED | OUTPATIENT
Start: 2024-01-03

## 2024-01-03 NOTE — PAYOR COMM NOTE
--------------  DISCHARGE REVIEW    Payor: [facility]  Subscriber #:  [id number]  Authorization Number: [id number]    Admit date: 23  Admit time:   6:07 PM  Discharge Date: 2023  1:22 PM     Admitting Physician: Héctor Tenorio MD  Attending Physician:  No att. providers found  Primary Care Physician: Derian Benjamin MD      Please reconsider  your determination .    Thank you         [facility]    Discharge Summary    Scar Cline Patient Status:  Inpatient    1965 MRN [id number]   Location [facility] 1W Attending Héctor Tenorio MD   Hosp Day # 1 PCP Derian Benjamin MD     Date of Admission: 2023 Disposition: Home or Self Care     Date of Discharge: 23     Admitting Diagnosis: Hyponatremia [E87.1]    Hospital Discharge Diagnoses:  Hyponatremia    Lace+ Score: 57  59-90 High Risk  29-58 Medium Risk  0-28   Low Risk.    TCM Follow-Up Recommendation:  LACE < 29: Low Risk of readmission after discharge from the hospital. No TCM follow-up needed.      Problem List:   Patient Active Problem List   Diagnosis    Controlled type 2 diabetes mellitus without complication, without long-term current use of insulin (HCC)    Myopia    Viral warts    Chest pain, atypical    Hypokalemia    Metabolic alkalosis    Dyspnea on exertion    Perioral cyanosis    Acute chest pain    Elevated immune protein in blood    Muscular pain    Chronic bilateral thoracic back pain    Neutrophilic leukocytosis    Thrombocytosis    Abnormal mammogram of right breast    Malignant neoplasm of upper-outer quadrant of right breast in female, estrogen receptor positive  (HCC)    Essential hypertension    Insulin dependent type 2 diabetes mellitus (HCC)    Absence of breast, bilateral    Absence of both breasts    Breast abscess    Dehydration    Anorexia    COVID-19    Bleeding from wound    Breast hematoma    Hyponatremia    Constipation, unspecified constipation type    Abdominal pain,  acute    Cervicalgia    Myofascial pain    Snoring    Insomnia       Reason for Admission:   Hyponatremia  Type 1 diabetes mellitus  HTN  History of breast CA  History of Hodgkin's lymphoma  Pancreatic insufficiency  Gastroparesis    Physical Exam:   General appearance: alert, appears stated age and cooperative  Pulmonary:  clear to auscultation bilaterally  Cardiovascular: S1, S2 normal, no murmur, click, rub or gallop, regular rate and rhythm  Abdominal: soft, non-tender; bowel sounds normal; no masses,  no organomegaly  Extremities: extremities normal, atraumatic, no cyanosis or edema  Psychiatric: calm      History of Present Illness:   Per Dr. Dumont  This is a 58 year oldfemale who is sent in by family after finding abnormal laboratory testing as outpatient and having concerns for continued decline and change in mental status.  Patient describes worse lower extremity swelling for the past 5 days or so.  She states she has been taking increased doses of hydrochlorothiazide for the past 3 days to combat this edema.  Patient had laboratory values drawn as outpatient which noted low sodium levels prompting visit to ED.  At time of interview, patient denied shortness of breath, chest pain, abdominal pain, nausea or vomiting.  Patient describes some generalized feeling of unwell, but unable to tolerate further.  Patient denies subjective fevers or chills or recent sick contacts.     Hospital Course:    Hyponatremia  -Patient presenting with mild confusion, lower extremity edema  -Noted to have sodium levels of 124.  -Patient reported recent use of hydrochlorothiazide and increased dosing of Depakote.  -stopping hydrochlorothiazide and Depakote  -Nephrology consulted, appreciate recommendations, sodium improved to 130  -repeat labs in am and follow-up with Dr. Carlos in 1 week    Confusion  -improved  -likely related to elevated depakote level (106) and hyponatremia  -CT scan with ch. Microvascular disease and mild  atrophy  -d/w family mental status improved, recomment, follow-up with psychiatry for depakote dosing, follow-up and monitor sodium, minimize episodes of hypoglycemia.  -if still with concern for confusion once above factors improved in ~3 months then would pursue further w/u which would include mri brain and      Type 1 diabetes mellitus  -Secondary to pancreatic insufficiency  - resume home insulin pump  -d/w dheanna, given hba1c 6, and episodes of hypoglycemia, would recommend liberalizing her diet  -Endocrinology on consult, appreciate recommendations     HTN  - controlled  -Avoiding hydrochlorothiazide due to hyponatremia as above  -Restart lisinopril  - Monitor and adjust as needed     Other problems  History of breast CA  History of Hodgkin's lymphoma  Pancreatic insufficiency  Gastroparesis     Consultations:   Cardiology  Nephrology  Endocrinology    Procedures: n/a    Complications: n/a    Discharge Condition: Good    Discharge Medications:      Discharge Medications        CONTINUE taking these medications        Instructions Prescription details   acetaminophen 500 MG Tabs  Commonly known as: Tylenol Extra Strength      Take 2 tablets (1,000 mg total) by mouth 2 (two) times daily as needed for Pain.   Refills: 0     Depakote  MG Tb24  Generic drug: divalproex ER      Take 1 tablet (500 mg total) by mouth nightly as needed.   Refills: 0     dilTIAZem HCl ER Coated Beads 180 MG Cp24  Commonly known as: Cartia XT      Take 1 capsule (180 mg total) by mouth daily.   Quantity: 60 capsule  Refills: 3     ibuprofen 800 MG Tabs  Commonly known as: Motrin      Take 1 tablet (800 mg total) by mouth 2 (two) times daily as needed for Pain.   Refills: 0     insulin glargine 100 UNIT/ML Soln  Commonly known as: Lantus      Inject into the skin as needed.   Refills: 0     lisinopril 10 MG Tabs  Commonly known as: Zestril      Take 1 tablet (10 mg total) by mouth in the morning and 1 tablet (10 mg total) before  bedtime.   Refills: 0     omeprazole 20 MG Cpdr  Commonly known as: PriLOSEC      Take 1 capsule (20 mg total) by mouth 2 (two) times daily before meals.   Refills: 0              Follow up Visits: Follow-up with PCP in 1 week    Follow up Labs: BMP     Other Discharge Instructions: FOLLOW-UP WITH DR. NEFTALI MADRID MD  12/31/2023  11:41 AM    > 35 min       Electronically signed by Héctor Madrid MD on 12/31/2023 12:15 PM         REVIEWER COMMENTS     Latest Reference Range & Units 12/30/23 11:54 12/30/23 16:25 12/31/23 00:43 12/31/23 10:01 01/02/24 12:30   Glucose 70 - 99 mg/dL 92 92 168 (H) 132 (H) 82   HEMOGLOBIN A1c <5.7 % 6.0 (H)       ESTIMATED AVERAGE GLUCOSE 68 - 126 mg/dL 126       Sodium 136 - 145 mmol/L 124 (L) 124 (L) 129 (L) 130 (L) 140   Potassium 3.5 - 5.1 mmol/L 3.7 3.5 3.4 (L) 4.5 4.2   Chloride 98 - 112 mmol/L 90 (L) 90 (L) 95 (L) 97 (L) 106   Carbon Dioxide, Total 21.0 - 32.0 mmol/L 28.0 27.0 27.0 29.0 27.0   BUN 9 - 23 mg/dL 17 16 16 16 16   CREATININE 0.55 - 1.02 mg/dL 0.81 0.75 0.93 0.88 0.77   CALCIUM 8.7 - 10.4 mg/dL 9.2 9.0 9.1 9.0 9.6   BUN/CREATININE RATIO 10.0 - 20.0  21.0 (H) 21.3 (H) 17.2 18.2 20.8 (H)   EGFR >=60 mL/min/1.73m2 84 92 71 76 89   ANION GAP 0 - 18 mmol/L 6 7 7 4 7   CALCULATED OSMOLALITY 275 - 295 mOsm/kg 259 (L) 259 (L) 273 (L) 273 (L) 290   (H): Data is abnormally high  (L): Data is abnormally low      Medications             Endocrinology 12/30 /23      Scar Cline is a very pleasant 58-year-old patient who is seen at the emergency room.  Patient came in with worsening edema.  Edema has been present for several weeks.  Has been using hydrochlorothiazide regularly.  Found to have hyponatremia with sodium of 124.  She has diabetes due to pancreatic insufficiency.  She has a prior history of pancreatitis.  Patient has some residual function as evidenced by measurable C-peptide in 2021.  Patient's imaging in 2021 showed severely atrophic pancreas.   She has been managed with insulin pump with very consistent control.  A1c running between 5.8 and 6.0.  Also has a prior history of congestive heart failure.  Most recent echocardiogram.  Patient has a history of asplenia, hypertension, Hodgkin's lymphoma, type 1 diabetes, breast cancer, status post bilateral mastectomy.  She has been diabetic since 2016.  Patient is treated with t:slim pump and uses Dexcom sensor.  Patient's insulin requirement has been relatively low amounting to as low as 20 units of insulin a day.  Patient reports tendency for frequent hypoglycemia and in fact has to discontinue her pump operations for days at a time to avoid hypoglycemia.  Patient has not had her pump on for more than 24 hours.  Initial glucose was in 90s.  Normal anion gap and CO2. Hyponatremic.  Valproic acid level was 106.2.  Patient seen at the emergency room.     Impression:     1.  Well-controlled type 1 diabetes  2.  Hyponatremia, likely SIADH  3.  Edema  4.  Hypertension  5.  History of exocrine pancreatic insufficiency  6.  History of Hodgkin's lymphoma  7.  Elevated valproic acid level  8.  History of breast CA     Plan:     Patient presents with edema and hyponatremia  Has history of type 1 diabetes due to pancreatic insufficiency  Patient has some residual insulin production, patient has now been without administration of insulin for more than 24 hours with adequate glycemic control.  Has had measurable C-peptide in the past.  Never had DKA in the past.  For now will not restart her insulin.  Will monitor glucose closely.  May need minimal amount of insulin based on clinical course  Will obtain urinary sodium-may have SIADH due to valproic acid and the effect of hydrochlorothiazide.  Patient's TSH is normal.  In the absence of hyperkalemia and hypotension, adrenal insufficiency unlikely.     Discussed plan with patient and her  Dr. Eric Cline  at bedside      Cardiology 12/30/23    Recommendations:  1  hyponatremia most likely due to thiazide diuretic urine sodium is high urine osmole's high give normal saline at 125 cc/h repeat labs in about 4 hours try to correct sodium not to quickly get up to about 1 30-1 35 by morning currently 124     #2 diabetes per attending  #3 question of diastolic dysfunction  #3 question of diastolic dysfunction echo report pending and appreciate note of Dr. Lam

## 2024-01-08 ENCOUNTER — LAB ENCOUNTER (OUTPATIENT)
Dept: LAB | Facility: HOSPITAL | Age: 59
End: 2024-01-08
Attending: ANESTHESIOLOGY
Payer: COMMERCIAL

## 2024-01-08 ENCOUNTER — MOBILE ENCOUNTER (OUTPATIENT)
Dept: PAIN CLINIC | Facility: HOSPITAL | Age: 59
End: 2024-01-08

## 2024-01-08 ENCOUNTER — MOBILE ENCOUNTER (OUTPATIENT)
Dept: ANESTHESIOLOGY | Facility: HOSPITAL | Age: 59
End: 2024-01-08

## 2024-01-08 DIAGNOSIS — E11.9 CONTROLLED TYPE 2 DIABETES MELLITUS WITHOUT COMPLICATION, WITHOUT LONG-TERM CURRENT USE OF INSULIN (HCC): ICD-10-CM

## 2024-01-08 DIAGNOSIS — E13.9 DIABETES 1.5, MANAGED AS TYPE 1 (HCC): Primary | ICD-10-CM

## 2024-01-08 DIAGNOSIS — E11.9 CONTROLLED TYPE 2 DIABETES MELLITUS WITHOUT COMPLICATION, WITHOUT LONG-TERM CURRENT USE OF INSULIN (HCC): Primary | ICD-10-CM

## 2024-01-08 LAB
ALBUMIN SERPL-MCNC: 5.1 G/DL (ref 3.2–4.8)
ALBUMIN/GLOB SERPL: 1.5 {RATIO} (ref 1–2)
ALP LIVER SERPL-CCNC: 96 U/L
ALT SERPL-CCNC: 16 U/L
ANION GAP SERPL CALC-SCNC: 9 MMOL/L (ref 0–18)
AST SERPL-CCNC: 27 U/L (ref ?–34)
BILIRUB SERPL-MCNC: 0.4 MG/DL (ref 0.3–1.2)
BUN BLD-MCNC: 13 MG/DL (ref 9–23)
BUN/CREAT SERPL: 14.6 (ref 10–20)
CALCIUM BLD-MCNC: 9.6 MG/DL (ref 8.7–10.4)
CHLORIDE SERPL-SCNC: 98 MMOL/L (ref 98–112)
CO2 SERPL-SCNC: 27 MMOL/L (ref 21–32)
CREAT BLD-MCNC: 0.89 MG/DL
EGFRCR SERPLBLD CKD-EPI 2021: 75 ML/MIN/1.73M2 (ref 60–?)
FASTING STATUS PATIENT QL REPORTED: YES
GLOBULIN PLAS-MCNC: 3.3 G/DL (ref 2.8–4.4)
GLUCOSE BLD-MCNC: 90 MG/DL (ref 70–99)
OSMOLALITY SERPL CALC.SUM OF ELEC: 278 MOSM/KG (ref 275–295)
POTASSIUM SERPL-SCNC: 4.2 MMOL/L (ref 3.5–5.1)
PROT SERPL-MCNC: 8.4 G/DL (ref 5.7–8.2)
SODIUM SERPL-SCNC: 134 MMOL/L (ref 136–145)

## 2024-01-08 PROCEDURE — 36415 COLL VENOUS BLD VENIPUNCTURE: CPT

## 2024-01-08 PROCEDURE — 80053 COMPREHEN METABOLIC PANEL: CPT

## 2024-01-10 ENCOUNTER — LAB ENCOUNTER (OUTPATIENT)
Dept: LAB | Facility: HOSPITAL | Age: 59
End: 2024-01-10
Attending: INTERNAL MEDICINE
Payer: COMMERCIAL

## 2024-01-10 DIAGNOSIS — I50.30 DIASTOLIC HEART FAILURE (HCC): ICD-10-CM

## 2024-01-10 DIAGNOSIS — I10 ESSENTIAL HYPERTENSION, MALIGNANT: Primary | ICD-10-CM

## 2024-01-10 LAB
ANION GAP SERPL CALC-SCNC: 7 MMOL/L (ref 0–18)
BUN BLD-MCNC: 15 MG/DL (ref 9–23)
BUN/CREAT SERPL: 16.5 (ref 10–20)
CALCIUM BLD-MCNC: 9.7 MG/DL (ref 8.7–10.4)
CHLORIDE SERPL-SCNC: 95 MMOL/L (ref 98–112)
CO2 SERPL-SCNC: 28 MMOL/L (ref 21–32)
CREAT BLD-MCNC: 0.91 MG/DL
EGFRCR SERPLBLD CKD-EPI 2021: 73 ML/MIN/1.73M2 (ref 60–?)
FASTING STATUS PATIENT QL REPORTED: NO
GLUCOSE BLD-MCNC: 95 MG/DL (ref 70–99)
OSMOLALITY SERPL CALC.SUM OF ELEC: 271 MOSM/KG (ref 275–295)
POTASSIUM SERPL-SCNC: 4.2 MMOL/L (ref 3.5–5.1)
SODIUM SERPL-SCNC: 130 MMOL/L (ref 136–145)

## 2024-01-10 PROCEDURE — 82088 ASSAY OF ALDOSTERONE: CPT

## 2024-01-10 PROCEDURE — 84244 ASSAY OF RENIN: CPT

## 2024-01-10 PROCEDURE — 36415 COLL VENOUS BLD VENIPUNCTURE: CPT

## 2024-01-10 PROCEDURE — 80048 BASIC METABOLIC PNL TOTAL CA: CPT

## 2024-01-15 ENCOUNTER — TELEPHONE (OUTPATIENT)
Dept: NEPHROLOGY | Facility: CLINIC | Age: 59
End: 2024-01-15

## 2024-01-15 DIAGNOSIS — E87.1 HYPONATREMIA: Primary | ICD-10-CM

## 2024-01-16 LAB — ALDOSTERONE: 8.8 NG/DL

## 2024-01-17 ENCOUNTER — TELEPHONE (OUTPATIENT)
Dept: NEPHROLOGY | Facility: CLINIC | Age: 59
End: 2024-01-17

## 2024-01-17 ENCOUNTER — LAB ENCOUNTER (OUTPATIENT)
Dept: LAB | Facility: HOSPITAL | Age: 59
End: 2024-01-17
Attending: INTERNAL MEDICINE
Payer: COMMERCIAL

## 2024-01-17 ENCOUNTER — MOBILE ENCOUNTER (OUTPATIENT)
Dept: ANESTHESIOLOGY | Facility: HOSPITAL | Age: 59
End: 2024-01-17

## 2024-01-17 ENCOUNTER — PATIENT MESSAGE (OUTPATIENT)
Dept: NEPHROLOGY | Facility: CLINIC | Age: 59
End: 2024-01-17

## 2024-01-17 DIAGNOSIS — E87.1 HYPONATREMIA: Primary | ICD-10-CM

## 2024-01-17 DIAGNOSIS — E87.1 HYPONATREMIA: ICD-10-CM

## 2024-01-17 DIAGNOSIS — E87.0 HYPERNATREMIA: Primary | ICD-10-CM

## 2024-01-17 LAB
ALDOST/RENIN RATIO: 2.9
ALDOSTERONE: 7.5 NG/DL
ANION GAP SERPL CALC-SCNC: 6 MMOL/L (ref 0–18)
BUN BLD-MCNC: 24 MG/DL (ref 9–23)
BUN/CREAT SERPL: 20.5 (ref 10–20)
CALCIUM BLD-MCNC: 9.2 MG/DL (ref 8.7–10.4)
CHLORIDE SERPL-SCNC: 105 MMOL/L (ref 98–112)
CO2 SERPL-SCNC: 29 MMOL/L (ref 21–32)
CREAT BLD-MCNC: 1.17 MG/DL
EGFRCR SERPLBLD CKD-EPI 2021: 54 ML/MIN/1.73M2 (ref 60–?)
FASTING STATUS PATIENT QL REPORTED: NO
GLUCOSE BLD-MCNC: 93 MG/DL (ref 70–99)
OSMOLALITY SERPL CALC.SUM OF ELEC: 294 MOSM/KG (ref 275–295)
POTASSIUM SERPL-SCNC: 4.5 MMOL/L (ref 3.5–5.1)
RENIN ACTIVITY: 2.54 NG/ML/HR
RENIN ACTIVITY: 3.76 NG/ML/HR
SODIUM SERPL-SCNC: 140 MMOL/L (ref 136–145)

## 2024-01-17 PROCEDURE — 80048 BASIC METABOLIC PNL TOTAL CA: CPT

## 2024-01-17 PROCEDURE — 36415 COLL VENOUS BLD VENIPUNCTURE: CPT

## 2024-01-20 ENCOUNTER — MOBILE ENCOUNTER (OUTPATIENT)
Dept: ANESTHESIOLOGY | Facility: HOSPITAL | Age: 59
End: 2024-01-20

## 2024-01-24 ENCOUNTER — LAB ENCOUNTER (OUTPATIENT)
Dept: LAB | Facility: HOSPITAL | Age: 59
End: 2024-01-24
Attending: INTERNAL MEDICINE
Payer: COMMERCIAL

## 2024-01-24 DIAGNOSIS — E87.1 HYPONATREMIA: ICD-10-CM

## 2024-01-24 LAB
ANION GAP SERPL CALC-SCNC: 8 MMOL/L (ref 0–18)
BUN BLD-MCNC: 20 MG/DL (ref 9–23)
BUN/CREAT SERPL: 20.8 (ref 10–20)
CALCIUM BLD-MCNC: 9.4 MG/DL (ref 8.7–10.4)
CHLORIDE SERPL-SCNC: 106 MMOL/L (ref 98–112)
CO2 SERPL-SCNC: 28 MMOL/L (ref 21–32)
CREAT BLD-MCNC: 0.96 MG/DL
EGFRCR SERPLBLD CKD-EPI 2021: 69 ML/MIN/1.73M2 (ref 60–?)
FASTING STATUS PATIENT QL REPORTED: NO
GLUCOSE BLD-MCNC: 83 MG/DL (ref 70–99)
OSMOLALITY SERPL CALC.SUM OF ELEC: 296 MOSM/KG (ref 275–295)
POTASSIUM SERPL-SCNC: 3.8 MMOL/L (ref 3.5–5.1)
SODIUM SERPL-SCNC: 142 MMOL/L (ref 136–145)

## 2024-01-24 PROCEDURE — 80048 BASIC METABOLIC PNL TOTAL CA: CPT

## 2024-01-24 PROCEDURE — 36415 COLL VENOUS BLD VENIPUNCTURE: CPT

## 2024-03-05 ENCOUNTER — MOBILE ENCOUNTER (OUTPATIENT)
Dept: ANESTHESIOLOGY | Facility: HOSPITAL | Age: 59
End: 2024-03-05

## 2024-03-05 DIAGNOSIS — E13.9 DIABETES 1.5, MANAGED AS TYPE 1 (HCC): Primary | ICD-10-CM

## 2024-03-06 ENCOUNTER — LAB ENCOUNTER (OUTPATIENT)
Dept: LAB | Facility: HOSPITAL | Age: 59
End: 2024-03-06
Attending: ANESTHESIOLOGY
Payer: COMMERCIAL

## 2024-03-06 DIAGNOSIS — E13.9 DIABETES 1.5, MANAGED AS TYPE 1 (HCC): ICD-10-CM

## 2024-03-06 LAB
ANION GAP SERPL CALC-SCNC: 6 MMOL/L (ref 0–18)
BUN BLD-MCNC: 17 MG/DL (ref 9–23)
BUN/CREAT SERPL: 16.8 (ref 10–20)
CALCIUM BLD-MCNC: 9.7 MG/DL (ref 8.7–10.4)
CHLORIDE SERPL-SCNC: 107 MMOL/L (ref 98–112)
CO2 SERPL-SCNC: 25 MMOL/L (ref 21–32)
CREAT BLD-MCNC: 1.01 MG/DL
EGFRCR SERPLBLD CKD-EPI 2021: 65 ML/MIN/1.73M2 (ref 60–?)
EST. AVERAGE GLUCOSE BLD GHB EST-MCNC: 126 MG/DL (ref 68–126)
FASTING STATUS PATIENT QL REPORTED: YES
GLUCOSE BLD-MCNC: 106 MG/DL (ref 70–99)
HBA1C MFR BLD: 6 % (ref ?–5.7)
OSMOLALITY SERPL CALC.SUM OF ELEC: 288 MOSM/KG (ref 275–295)
POTASSIUM SERPL-SCNC: 4.3 MMOL/L (ref 3.5–5.1)
SODIUM SERPL-SCNC: 138 MMOL/L (ref 136–145)

## 2024-03-06 PROCEDURE — 80048 BASIC METABOLIC PNL TOTAL CA: CPT

## 2024-03-06 PROCEDURE — 36415 COLL VENOUS BLD VENIPUNCTURE: CPT

## 2024-03-06 PROCEDURE — 83036 HEMOGLOBIN GLYCOSYLATED A1C: CPT

## 2024-04-06 ENCOUNTER — MOBILE ENCOUNTER (OUTPATIENT)
Dept: ANESTHESIOLOGY | Facility: HOSPITAL | Age: 59
End: 2024-04-06

## 2024-04-06 ENCOUNTER — LAB ENCOUNTER (OUTPATIENT)
Dept: LAB | Facility: HOSPITAL | Age: 59
End: 2024-04-06
Attending: ANESTHESIOLOGY
Payer: COMMERCIAL

## 2024-04-06 ENCOUNTER — HOSPITAL ENCOUNTER (INPATIENT)
Facility: HOSPITAL | Age: 59
LOS: 2 days | Discharge: HOME | End: 2024-04-08
Attending: EMERGENCY MEDICINE
Payer: COMMERCIAL

## 2024-04-06 DIAGNOSIS — E11.9 CONTROLLED TYPE 2 DIABETES MELLITUS WITHOUT COMPLICATION, WITHOUT LONG-TERM CURRENT USE OF INSULIN (HCC): Primary | ICD-10-CM

## 2024-04-06 DIAGNOSIS — E87.1 HYPONATREMIA: Primary | ICD-10-CM

## 2024-04-06 DIAGNOSIS — F41.1 GENERALIZED ANXIETY DISORDER: ICD-10-CM

## 2024-04-06 DIAGNOSIS — E11.9 CONTROLLED TYPE 2 DIABETES MELLITUS WITHOUT COMPLICATION, WITHOUT LONG-TERM CURRENT USE OF INSULIN (HCC): ICD-10-CM

## 2024-04-06 PROBLEM — R73.9 HYPERGLYCEMIA: Status: ACTIVE | Noted: 2024-04-06

## 2024-04-06 LAB
AMPHET UR QL SCN: NEGATIVE
ANION GAP SERPL CALC-SCNC: 6 MMOL/L (ref 0–18)
ANION GAP SERPL CALC-SCNC: 7 MMOL/L (ref 0–18)
BARBITURATES UR QL SCN: NEGATIVE
BASOPHILS # BLD AUTO: 0.1 X10(3) UL (ref 0–0.2)
BASOPHILS NFR BLD AUTO: 1.2 %
BILIRUB UR QL: NEGATIVE
BUN BLD-MCNC: 9 MG/DL (ref 9–23)
BUN BLD-MCNC: 9 MG/DL (ref 9–23)
BUN/CREAT SERPL: 11.7 (ref 10–20)
BUN/CREAT SERPL: 11.8 (ref 10–20)
CALCIUM BLD-MCNC: 9.6 MG/DL (ref 8.7–10.4)
CALCIUM BLD-MCNC: 9.8 MG/DL (ref 8.7–10.4)
CANNABINOIDS UR QL SCN: NEGATIVE
CHLORIDE SERPL-SCNC: 87 MMOL/L (ref 98–112)
CHLORIDE SERPL-SCNC: 88 MMOL/L (ref 98–112)
CLARITY UR: CLEAR
CO2 SERPL-SCNC: 24 MMOL/L (ref 21–32)
CO2 SERPL-SCNC: 25 MMOL/L (ref 21–32)
COCAINE UR QL: NEGATIVE
COLOR UR: COLORLESS
CREAT BLD-MCNC: 0.76 MG/DL
CREAT BLD-MCNC: 0.77 MG/DL
CREAT UR-SCNC: 17.8 MG/DL
CREAT UR-SCNC: 18 MG/DL
DEPRECATED RDW RBC AUTO: 39.8 FL (ref 35.1–46.3)
EGFRCR SERPLBLD CKD-EPI 2021: 89 ML/MIN/1.73M2 (ref 60–?)
EGFRCR SERPLBLD CKD-EPI 2021: 91 ML/MIN/1.73M2 (ref 60–?)
EOSINOPHIL # BLD AUTO: 0.03 X10(3) UL (ref 0–0.7)
EOSINOPHIL NFR BLD AUTO: 0.4 %
ERYTHROCYTE [DISTWIDTH] IN BLOOD BY AUTOMATED COUNT: 13.8 % (ref 11–15)
EST. AVERAGE GLUCOSE BLD GHB EST-MCNC: 120 MG/DL (ref 68–126)
FASTING STATUS PATIENT QL REPORTED: YES
GLUCOSE BLD-MCNC: 102 MG/DL (ref 70–99)
GLUCOSE BLD-MCNC: 105 MG/DL (ref 70–99)
GLUCOSE BLDC GLUCOMTR-MCNC: 155 MG/DL (ref 70–99)
GLUCOSE BLDC GLUCOMTR-MCNC: 92 MG/DL (ref 70–99)
GLUCOSE UR-MCNC: NORMAL MG/DL
HBA1C MFR BLD: 5.8 % (ref ?–5.7)
HCT VFR BLD AUTO: 34.6 %
HGB BLD-MCNC: 12.3 G/DL
IMM GRANULOCYTES # BLD AUTO: 0.01 X10(3) UL (ref 0–1)
IMM GRANULOCYTES NFR BLD: 0.1 %
KETONES UR-MCNC: NEGATIVE MG/DL
LEUKOCYTE ESTERASE UR QL STRIP.AUTO: NEGATIVE
LYMPHOCYTES # BLD AUTO: 3.11 X10(3) UL (ref 1–4)
LYMPHOCYTES NFR BLD AUTO: 38.4 %
MCH RBC QN AUTO: 28 PG (ref 26–34)
MCHC RBC AUTO-ENTMCNC: 35.5 G/DL (ref 31–37)
MCV RBC AUTO: 78.8 FL
MDMA UR QL SCN: NEGATIVE
METHADONE UR QL SCN: NEGATIVE
MONOCYTES # BLD AUTO: 0.94 X10(3) UL (ref 0.1–1)
MONOCYTES NFR BLD AUTO: 11.6 %
NEUTROPHILS # BLD AUTO: 3.91 X10 (3) UL (ref 1.5–7.7)
NEUTROPHILS # BLD AUTO: 3.91 X10(3) UL (ref 1.5–7.7)
NEUTROPHILS NFR BLD AUTO: 48.3 %
NITRITE UR QL STRIP.AUTO: NEGATIVE
OPIATES UR QL SCN: NEGATIVE
OSMOLALITY SERPL CALC.SUM OF ELEC: 245 MOSM/KG (ref 275–295)
OSMOLALITY SERPL CALC.SUM OF ELEC: 247 MOSM/KG (ref 275–295)
OSMOLALITY SERPL: 252 MOSM/KG (ref 275–295)
OSMOLALITY UR: 215 MOSM/KG (ref 300–1100)
OXYCODONE UR QL SCN: NEGATIVE
PCP UR QL SCN: NEGATIVE
PH UR: 5 [PH] (ref 5–8)
PLATELET # BLD AUTO: 677 10(3)UL (ref 150–450)
POTASSIUM SERPL-SCNC: 3.6 MMOL/L (ref 3.5–5.1)
POTASSIUM SERPL-SCNC: 4 MMOL/L (ref 3.5–5.1)
PROT UR-MCNC: NEGATIVE MG/DL
RBC # BLD AUTO: 4.39 X10(6)UL
SODIUM SERPL-SCNC: 118 MMOL/L (ref 136–145)
SODIUM SERPL-SCNC: 119 MMOL/L (ref 136–145)
SODIUM SERPL-SCNC: 123 MMOL/L (ref 136–145)
SODIUM SERPL-SCNC: 61 MMOL/L
SP GR UR STRIP: 1.01 (ref 1–1.03)
URATE SERPL-MCNC: 2.2 MG/DL
UROBILINOGEN UR STRIP-ACNC: NORMAL
WBC # BLD AUTO: 8.1 X10(3) UL (ref 4–11)

## 2024-04-06 PROCEDURE — 99223 1ST HOSP IP/OBS HIGH 75: CPT | Performed by: INTERNAL MEDICINE

## 2024-04-06 PROCEDURE — 83036 HEMOGLOBIN GLYCOSYLATED A1C: CPT

## 2024-04-06 PROCEDURE — 99282 EMERGENCY DEPT VISIT SF MDM: CPT | Performed by: INTERNAL MEDICINE

## 2024-04-06 PROCEDURE — 90792 PSYCH DIAG EVAL W/MED SRVCS: CPT | Performed by: OTHER

## 2024-04-06 PROCEDURE — 80048 BASIC METABOLIC PNL TOTAL CA: CPT

## 2024-04-06 PROCEDURE — 36415 COLL VENOUS BLD VENIPUNCTURE: CPT

## 2024-04-06 RX ORDER — BISACODYL 10 MG
10 SUPPOSITORY, RECTAL RECTAL
Status: DISCONTINUED | OUTPATIENT
Start: 2024-04-06 | End: 2024-04-08

## 2024-04-06 RX ORDER — ENEMA 19; 7 G/133ML; G/133ML
1 ENEMA RECTAL ONCE AS NEEDED
Status: DISCONTINUED | OUTPATIENT
Start: 2024-04-06 | End: 2024-04-08

## 2024-04-06 RX ORDER — ENOXAPARIN SODIUM 100 MG/ML
40 INJECTION SUBCUTANEOUS DAILY
Status: DISCONTINUED | OUTPATIENT
Start: 2024-04-06 | End: 2024-04-08

## 2024-04-06 RX ORDER — HYDROCODONE BITARTRATE AND ACETAMINOPHEN 5; 325 MG/1; MG/1
2 TABLET ORAL EVERY 4 HOURS PRN
Status: DISCONTINUED | OUTPATIENT
Start: 2024-04-06 | End: 2024-04-07

## 2024-04-06 RX ORDER — SENNOSIDES 8.6 MG
17.2 TABLET ORAL NIGHTLY PRN
Status: DISCONTINUED | OUTPATIENT
Start: 2024-04-06 | End: 2024-04-08

## 2024-04-06 RX ORDER — HYDROCODONE BITARTRATE AND ACETAMINOPHEN 5; 325 MG/1; MG/1
1 TABLET ORAL EVERY 4 HOURS PRN
Status: DISCONTINUED | OUTPATIENT
Start: 2024-04-06 | End: 2024-04-07

## 2024-04-06 RX ORDER — PANTOPRAZOLE SODIUM 40 MG/1
40 TABLET, DELAYED RELEASE ORAL
Status: DISCONTINUED | OUTPATIENT
Start: 2024-04-07 | End: 2024-04-08

## 2024-04-06 RX ORDER — POLYETHYLENE GLYCOL 3350 17 G/17G
17 POWDER, FOR SOLUTION ORAL DAILY PRN
Status: DISCONTINUED | OUTPATIENT
Start: 2024-04-06 | End: 2024-04-08

## 2024-04-06 RX ORDER — LISINOPRIL 10 MG/1
10 TABLET ORAL 2 TIMES DAILY
Status: DISCONTINUED | OUTPATIENT
Start: 2024-04-06 | End: 2024-04-08

## 2024-04-06 RX ORDER — ACETAMINOPHEN 325 MG/1
650 TABLET ORAL EVERY 4 HOURS PRN
Status: DISCONTINUED | OUTPATIENT
Start: 2024-04-06 | End: 2024-04-08

## 2024-04-06 RX ORDER — ACETAMINOPHEN 500 MG
500 TABLET ORAL EVERY 4 HOURS PRN
Status: DISCONTINUED | OUTPATIENT
Start: 2024-04-06 | End: 2024-04-08

## 2024-04-06 RX ORDER — DILTIAZEM HYDROCHLORIDE 180 MG/1
360 CAPSULE, EXTENDED RELEASE ORAL DAILY
Status: DISCONTINUED | OUTPATIENT
Start: 2024-04-06 | End: 2024-04-07

## 2024-04-06 RX ORDER — NICOTINE POLACRILEX 4 MG
15 LOZENGE BUCCAL
Status: DISCONTINUED | OUTPATIENT
Start: 2024-04-06 | End: 2024-04-08

## 2024-04-06 RX ORDER — DEXTROSE MONOHYDRATE 25 G/50ML
50 INJECTION, SOLUTION INTRAVENOUS
Status: DISCONTINUED | OUTPATIENT
Start: 2024-04-06 | End: 2024-04-08

## 2024-04-06 RX ORDER — ONDANSETRON 2 MG/ML
4 INJECTION INTRAMUSCULAR; INTRAVENOUS EVERY 6 HOURS PRN
Status: DISCONTINUED | OUTPATIENT
Start: 2024-04-06 | End: 2024-04-08

## 2024-04-06 RX ORDER — SODIUM CHLORIDE 1 G/1
1 TABLET ORAL DAILY
Status: DISCONTINUED | OUTPATIENT
Start: 2024-04-06 | End: 2024-04-08

## 2024-04-06 RX ORDER — NICOTINE POLACRILEX 4 MG
30 LOZENGE BUCCAL
Status: DISCONTINUED | OUTPATIENT
Start: 2024-04-06 | End: 2024-04-08

## 2024-04-06 RX ORDER — TEMAZEPAM 15 MG/1
15 CAPSULE ORAL NIGHTLY PRN
Status: DISCONTINUED | OUTPATIENT
Start: 2024-04-06 | End: 2024-04-08

## 2024-04-06 NOTE — CONSULTS
[facility]                                                            CONSULT NOTE      Patient Name: Scar Cline MRN: [id number]   : 1965 CSN: [id number]       Reason for consultation:   Asked by Asif Harvey MD to provide evaluation and management of hypertension.    Assessment and Recommendations:     Hyponatremia  -Neph Svc consult noted.    Hypertension  -Usually in 130s  -BP was elevated today to 190, came down to 160 in ED, and then to 144 now  -Likely situational elevated BP in setting of acute medical process  -I recommend continuing lisinopril at the current dose  -Further changes in BP meds can be made as an outpt if she is still above goal    Chronic HFpEF  -No recent weight gain, fluid overload, edema  -Continue with home CHF action plan  -Follow-up with Dr Neil in office in 1-2 weeks      Thank you for the consultation.    Gavin Bran MD  Card[facility] [place]  2024  C5    History of present illness:   The patient is a 58 year old with unsteady gait and feeling \"off\", and was found to be hyponatremic. This had occurred in the past due to diuretic use for HTN.  This time, it is though to be due to trileptal.  She also noted an elevated BP this morning at 190 mmHg. An additional Lasix and lisinopril were given.  On ED arrival, BP was 160, then later 144.  She has no chest pain, dyspnea, headache, or edema.    ROS:   Constitutional: No fevers, chills, or night sweats.  ENT: No mouth pain, neck pain, running nose, headaches or swollen glands.  Skin: No rashes, pruritus or skin changes,  Respiratory: No cough, wheezing or shortness of breath.  CV: No chest pain or claudication.  Musculoskeletal: No joint pain, stiffness or swelling.  : No dysuria or hematuria.  GI: No nausea, vomiting or diarrhea. No blood in stools.  Neurologic: No seizures, tremors,  weakness or numbness. + unsteadiness, gait    Past Medical, Surgical, Family, and Social Histories:     Past Medical History:   Diagnosis Date    Asplenia after surgical procedure 1984    Breast CA (HCC) 2019    right breast-bilat mastectomy    CMV hepatitis (HCC) 1997    Congestive heart disease (HCC)     dx 2019 dec-resolved now    COVID-19     positive 3/2020 - hospitalized-has had negative nasal swabs , last week last one    Gastroparesis     High blood pressure     Hodgkin's disease (HCC) 1984    3B-chemo    Insulin dependent type 2 diabetes mellitus (HCC) 12/23/2019    Neuropathy 1984    R arm, hand, legs    Pancreatic insufficiency (HCC)     Personal history of antineoplastic chemotherapy 1984    Pneumococcal sepsis (HCC) 1994    sepsis R/T pneumococcal bacteria     Serratia 2005    Stress fracture of hip 2013    hip pinning    Type 1 diabetes mellitus (HCC)     Type 1-C    Visual impairment     WEARS GLASSES/CONTACT     Past Surgical History:   Procedure Laterality Date    APPENDECTOMY  1992    CHEMOTHERAPY  1984    Hodgkin;s disease-3B    COLONOSCOPY  03/21/2014    COLONOSCOPY N/A 10/19/2019    Procedure: COLONOSCOPY;  Surgeon: Eric De Jesus MD;  Location: [facility] ENDOSCOPY    COLONOSCOPY N/A 07/18/2020    Procedure: COLONOSCOPY;  Surgeon: Eric De Jesus MD;  Location: [facility] ENDOSCOPY    FRACTURE SURGERY Right 09/05/2013    Right femoral neck stress fracture percutaneous pinning    HIP SURGERY Left 2010    left, no pins    IMPLANT LEFT  12/28/2020    IMPLANT RIGHT Bilateral 12/28/2020    Removal of bilateral silicone breast implants; Placement of permanent implants,  Autologous fat grafting from abdomen, flanks, and inner thighs    FELISHA LOCALIZATION WIRE 1 SITE RIGHT (CPT=19281)      1994    MASTECTOMY LEFT      MASTECTOMY RIGHT  12/23/2019    Bilateral skin sparing mastectomy with right breast injection    REMOVE TISSUE EXPANDER(S)  08/06/2020    Removal of bilateral tissue expanders,  Placement of  permanent silicone gel implant...    REMOVE TISSUE EXPANDER(S) Left 06/18/2020    Removal of left breast tissue expander. Evacuation and washout of left breast hematoma....    REMOVE TISSUE EXPANDER(S) Left 02/29/2020    Left breast tissue expander removal. Irrigation and washout, left breast. Complete capsulectomy, left breast    SPLENECTOMY  1984     Family History   Problem Relation Age of Onset    Glaucoma Mother     Hypertension Mother     Heart Disease Father         CAD    Hypertension Father     Other (Other) Father         polymyalgia rheumatic    Breast Cancer Maternal Grandmother 80        post menopausal    Genetic Disease Other 5        Neurofibromatosis    Other (non Hodgkin's lymphoma follicular) Sister 53    Other (epilepsy) Son     No Known Problems Daughter     Cancer Self         hodgkins 1994    Diabetes Neg        SHX:  The patient reports that she has never smoked. She has never used smokeless tobacco. She reports that she does not drink alcohol and does not use drugs.    Allergies:     Allergies   Allergen Reactions    Iodinated Diagnostic Agents [Radiology Contrast Iodinated Dyes] HIVES     Other reaction(s): IODINATED CONTRAST MEDIA - IV DYE    Penicillins OTHER (SEE COMMENTS)     MOUTH SORES    Scopolamine OTHER (SEE COMMENTS)     Very confused       Medications:     Current Outpatient Medications   Medication Instructions    acetaminophen (TYLENOL EXTRA STRENGTH) 1,000 mg, Oral, 2 times daily PRN    dilTIAZem HCl ER Coated Beads (CARTIA XT) 180 mg, Oral, Daily    divalproex ER (DEPAKOTE ER) 500 mg, Oral, Nightly PRN    furosemide (LASIX) 20 mg, Oral, Daily    ibuprofen (MOTRIN) 800 mg, Oral, 2 times daily PRN    insulin glargine 100 UNIT/ML Subcutaneous Solution Subcutaneous, As needed    lisinopril (ZESTRIL) 10 mg, Oral, 2 times daily    omeprazole (PRILOSEC) 20 mg, Oral, 2 times daily before meals     PHYSICAL EXAM:   Blood pressure 144/90, pulse 64, temperature 98.7 °F (37.1 °C),  temperature source Temporal, resp. rate 15, height 170.2 cm (5' 7\"), weight 159 lb (72.1 kg), SpO2 99%.  GEN - no distress  HEENT - normal conjunctiva, moist mucosa  Neck - supple, no LAD  Lungs - nonlabored, clear bilaterally  Heart - JVP 14 cm H2O, carotids normal; RRR, nl S1/S2; no edema  Abd - soft, nontender  Ext - arthritic changes  Neuro - A+Ox3, no facial droop or gross deficits  Psych - cooperative, calm    LABS AND DATA:     ECG: sinus, 71 bpm    Lab Results   Component Value Date    WBC 8.1 04/06/2024    HGB 12.3 04/06/2024    HCT 34.6 (L) 04/06/2024    .0 (H) 04/06/2024    CREATSERUM 0.76 04/06/2024    BUN 9 04/06/2024     (LL) 04/06/2024    K 3.6 04/06/2024    CL 87 (L) 04/06/2024    CO2 25.0 04/06/2024     (H) 04/06/2024    CA 9.8 04/06/2024    ALB 5.1 (H) 01/08/2024    ALKPHO 96 01/08/2024    BILT 0.4 01/08/2024    TP 8.4 (H) 01/08/2024    AST 27 01/08/2024    ALT 16 01/08/2024    INR 1.0 10/24/2014    PT 13.2 10/24/2014    T4F 1.0 12/30/2023    TSH 1.716 12/30/2023    LIP 79 01/25/2022    ESRML 19 10/31/2019    CRP 0.69 (H) 04/07/2020    MG 2.4 12/31/2023    PHOS 3.8 01/02/2024    TROP <0.045 04/08/2019     04/06/2020     ------------------------------------------------------------------------------------------------------------------------------

## 2024-04-06 NOTE — CONSULTS
[facility]  part of [facility]    Report of Consultation    Scar Cline Patient Status:  Emergency    1965 MRN [id number]   Location [facility] EMERGENCY DEPARTMENT Attending Asif Harvey MD   Hosp Day # 0 PCP Derian Benjamin MD     Date of Admission:  2024  Date of Consult:  2024   Reason for Consultation:   Hyponatremia    History of Present Illness:   Patient is a 58 year old female who was admitted to the hospital for Hyponatremia:    59 y/o F with h/o diabetes, HTN, breast ca, hodgkin's lymphoma , with episode of hyponatremia in dec ( thought sec to hydrochlorothiazide and Depakote and was stopped at dc) had blood work done earlier today for unstable gait, and not feeling her usual self. Labs showed sodium of 119 and came to ER.    Here repeat sodium is 118, serum uric acid 2.2 , urine osm pending, urine sodium is 61  3 weeks back started trileptal  ( known to cause siadh)   States no other new medications. Fluid intake has been reasonable    Past Medical History  Past Medical History:   Diagnosis Date    Asplenia after surgical procedure 1984    Breast CA (HCC) 2019    right breast-bilat mastectomy    CMV hepatitis (HCC)     Congestive heart disease (HCC)     dx 2019 dec-resolved now    COVID-19     positive 3/2020 - hospitalized-has had negative nasal swabs , last week last one    Gastroparesis     High blood pressure     Hodgkin's disease (HCC)     3B-chemo    Insulin dependent type 2 diabetes mellitus (HCC) 2019    Neuropathy     R arm, hand, legs    Pancreatic insufficiency (HCC)     Personal history of antineoplastic chemotherapy     Pneumococcal sepsis (HCC)     sepsis R/T pneumococcal bacteria     Serratia     Stress fracture of hip 2013    hip pinning    Type 1 diabetes mellitus (HCC)     Type 1-C    Visual impairment     WEARS GLASSES/CONTACT       Past Surgical History  Past Surgical History:   Procedure  Laterality Date    APPENDECTOMY  1992    CHEMOTHERAPY  1984    Hodgkin;s disease-3B    COLONOSCOPY  03/21/2014    COLONOSCOPY N/A 10/19/2019    Procedure: COLONOSCOPY;  Surgeon: Eric De Jesus MD;  Location: [facility] ENDOSCOPY    COLONOSCOPY N/A 07/18/2020    Procedure: COLONOSCOPY;  Surgeon: Eric De Jesus MD;  Location: [facility] ENDOSCOPY    FRACTURE SURGERY Right 09/05/2013    Right femoral neck stress fracture percutaneous pinning    HIP SURGERY Left 2010    left, no pins    IMPLANT LEFT  12/28/2020    IMPLANT RIGHT Bilateral 12/28/2020    Removal of bilateral silicone breast implants; Placement of permanent implants,  Autologous fat grafting from abdomen, flanks, and inner thighs    FELISHA LOCALIZATION WIRE 1 SITE RIGHT (CPT=19281)      1994    MASTECTOMY LEFT      MASTECTOMY RIGHT  12/23/2019    Bilateral skin sparing mastectomy with right breast injection    REMOVE TISSUE EXPANDER(S)  08/06/2020    Removal of bilateral tissue expanders,  Placement of permanent silicone gel implant...    REMOVE TISSUE EXPANDER(S) Left 06/18/2020    Removal of left breast tissue expander. Evacuation and washout of left breast hematoma....    REMOVE TISSUE EXPANDER(S) Left 02/29/2020    Left breast tissue expander removal. Irrigation and washout, left breast. Complete capsulectomy, left breast    SPLENECTOMY  1984       Family History  Family History   Problem Relation Age of Onset    Glaucoma Mother     Hypertension Mother     Heart Disease Father         CAD    Hypertension Father     Other (Other) Father         polymyalgia rheumatic    Breast Cancer Maternal Grandmother 80        post menopausal    Genetic Disease Other 5        Neurofibromatosis    Other (non Hodgkin's lymphoma follicular) Sister 53    Other (epilepsy) Son     No Known Problems Daughter     Cancer Self         hodgkins 1994    Diabetes Neg        Social History  Social History     Socioeconomic History    Marital status:    Occupational History     Occupation:    Tobacco Use    Smoking status: Never    Smokeless tobacco: Never   Vaping Use    Vaping Use: Never used   Substance and Sexual Activity    Alcohol use: No    Drug use: No   Other Topics Concern    Caffeine Concern Yes     Comment: coffee-1 cup/day    Pt has a pacemaker No    Pt has a defibrillator No    Reaction to local anesthetic No     Social Determinants of Health     Food Insecurity: No Food Insecurity (12/30/2023)    Food Insecurity     Food Insecurity: Never true   Transportation Needs: No Transportation Needs (12/30/2023)    Transportation Needs     Lack of Transportation: No   Housing Stability: Low Risk  (12/30/2023)    Housing Stability     Housing Instability: No       Current Medications:  No current facility-administered medications for this encounter.     (Not in a hospital admission)      Allergies  Allergies   Allergen Reactions    Iodinated Diagnostic Agents [Radiology Contrast Iodinated Dyes] HIVES     Other reaction(s): IODINATED CONTRAST MEDIA - IV DYE    Penicillins OTHER (SEE COMMENTS)     MOUTH SORES    Scopolamine OTHER (SEE COMMENTS)     Very confused       Review of Systems:   GENERAL HEALTH: feels well otherwise  SKIN: denies any unusual skin lesions or rashes  RESPIRATORY: denies shortness of breath with exertion, no cough, no hemoptysis  CARDIOVASCULAR: denies chest pain on exertion, no palpitations  :  Denies hematuria, dysuria, foamy urine  GI: denies abdominal pain and denies heartburn, no nausea/vomiting/diarrhea  EXT: no edema  NEURO: denies headaches      A comprehensive 12 point review of systems was completed.  Pertinent positives as above and all the rest were negative.     Physical Exam:   BP (!) 160/92   Pulse 65   Temp 98.7 °F (37.1 °C) (Temporal)   Resp 16   Ht 5' 7\" (1.702 m)   Wt 159 lb (72.1 kg)   SpO2 95%   BMI 24.90 kg/m²    No intake or output data in the 24 hours ending 04/06/24 1554  Wt Readings from Last 1 Encounters:   04/06/24 159  lb (72.1 kg)       Exam  GENERAL: well developed, well nourished,in no apparent distress  SKIN: no rashes  HEENT: no scleral icterus, moist mucus membranes  NECK: supple,no adenopathy,no bruits  LUNGS: clear to auscultation without crackles or wheezes  CARDIO: RRR without murmur  GI: good BS's,no masses, HSM or tenderness, soft, non-distended, no audible bruits  EXTREMITIES: no cyanosis, clubbing or edema  NEURO: CN grossly intact, A+O x3  Access      Results:     Laboratory Data:  Recent Labs   Lab 04/06/24  1504   RBC 4.39   HGB 12.3   HCT 34.6*   MCV 78.8*   MCH 28.0   MCHC 35.5   RDW 13.8   NEPRELIM 3.91   WBC 8.1   .0*         Recent Labs   Lab 04/06/24  1302 04/06/24  1504   * 102*   BUN 9 9   CREATSERUM 0.77 0.76   CA 9.6 9.8   * 118*   K 4.0 3.6   CL 88* 87*   CO2 24.0 25.0        Imaging:  No results found.        Impression/Plan:     Impression:  Hyponatremia: urine studies suggestive of SIADH , likely cause being trileptal . Will plan for holding it and change to some other medication. FR (1.2 L/day) + inc solute intake+ salt tabs . Will have sodium check q 4 hrs. Aim to correct sodium 8 meq in next 24 hrs. May need to use tolvaptan if improvement not seen. Check TSH and cortisol  HTN resume lisinopril.     Dw  at bedside    Thank you very much for allowing me to participate in the care of your patient.  If you have any questions, please do not hesitate to contact me.     Raquel Pérez MD  4/6/2024

## 2024-04-06 NOTE — ED PROVIDER NOTES
Patient Seen in: [facility] Emergency Department      History     Chief Complaint   Patient presents with    Abnormal Result     Stated Complaint: Abnormal lab; low sodium level    Subjective:   HPI    Patient presents the emergency department with abnormal laboratory studies.  History obtained from the patient as well as her  who is an anesthesiologist on staff here.  He states that she has had hyponatremia in the past which was attributed to medication use including Depakote as well as hydrochlorothiazide hypertensive medication.  Over the last several days she has had weakness and \"slowing\".  He took her for some labs today and found her sodium to be 119.  She was recently started on oxcarbazepine which they think may have initiated the hyponatremia today.  She denies pain but feels \"crummy\".    Objective:   Past Medical History:   Diagnosis Date    Asplenia after surgical procedure 1984    Breast CA (HCC) 2019    right breast-bilat mastectomy    CMV hepatitis (HCC) 1997    Congestive heart disease (HCC)     dx 2019 dec-resolved now    COVID-19     positive 3/2020 - hospitalized-has had negative nasal swabs , last week last one    Gastroparesis     High blood pressure     Hodgkin's disease (HCC) 1984    3B-chemo    Insulin dependent type 2 diabetes mellitus (HCC) 12/23/2019    Neuropathy 1984    R arm, hand, legs    Pancreatic insufficiency (HCC)     Personal history of antineoplastic chemotherapy 1984    Pneumococcal sepsis (HCC) 1994    sepsis R/T pneumococcal bacteria     Serratia 2005    Stress fracture of hip 2013    hip pinning    Type 1 diabetes mellitus (HCC)     Type 1-C    Visual impairment     WEARS GLASSES/CONTACT              Past Surgical History:   Procedure Laterality Date    APPENDECTOMY  1992    CHEMOTHERAPY  1984    Hodgkin;s disease-3B    COLONOSCOPY  03/21/2014    COLONOSCOPY N/A 10/19/2019    Procedure: COLONOSCOPY;  Surgeon: Eric De Jesus MD;  Location: [facility] ENDOSCOPY     COLONOSCOPY N/A 07/18/2020    Procedure: COLONOSCOPY;  Surgeon: Eric De Jesus MD;  Location: [facility] ENDOSCOPY    FRACTURE SURGERY Right 09/05/2013    Right femoral neck stress fracture percutaneous pinning    HIP SURGERY Left 2010    left, no pins    IMPLANT LEFT  12/28/2020    IMPLANT RIGHT Bilateral 12/28/2020    Removal of bilateral silicone breast implants; Placement of permanent implants,  Autologous fat grafting from abdomen, flanks, and inner thighs    FELISHA LOCALIZATION WIRE 1 SITE RIGHT (CPT=19281)      1994    MASTECTOMY LEFT      MASTECTOMY RIGHT  12/23/2019    Bilateral skin sparing mastectomy with right breast injection    REMOVE TISSUE EXPANDER(S)  08/06/2020    Removal of bilateral tissue expanders,  Placement of permanent silicone gel implant...    REMOVE TISSUE EXPANDER(S) Left 06/18/2020    Removal of left breast tissue expander. Evacuation and washout of left breast hematoma....    REMOVE TISSUE EXPANDER(S) Left 02/29/2020    Left breast tissue expander removal. Irrigation and washout, left breast. Complete capsulectomy, left breast    SPLENECTOMY  1984                Social History     Socioeconomic History    Marital status:    Occupational History    Occupation:    Tobacco Use    Smoking status: Never    Smokeless tobacco: Never   Vaping Use    Vaping Use: Never used   Substance and Sexual Activity    Alcohol use: No    Drug use: No   Other Topics Concern    Caffeine Concern Yes     Comment: coffee-1 cup/day    Pt has a pacemaker No    Pt has a defibrillator No    Reaction to local anesthetic No     Social Determinants of Health     Food Insecurity: No Food Insecurity (4/6/2024)    Food Insecurity     Food Insecurity: Never true   Transportation Needs: No Transportation Needs (4/6/2024)    Transportation Needs     Lack of Transportation: No   Housing Stability: Low Risk  (4/6/2024)    Housing Stability     Housing Instability: No              Review of Systems    Positive  for stated complaint: Abnormal lab; low sodium level  Other systems are as noted in HPI.  Constitutional and vital signs reviewed.      All other systems reviewed and negative except as noted above.    Physical Exam     ED Triage Vitals [04/06/24 1429]   /89   Pulse 78   Resp 18   Temp 98.7 °F (37.1 °C)   Temp src Temporal   SpO2 99 %   O2 Device None (Room air)       Current:/81 (BP Location: Right arm)   Pulse 67   Temp 98.2 °F (36.8 °C) (Oral)   Resp 18   Ht 170.2 cm (5' 7\")   Wt 73.1 kg   SpO2 99%   BMI 25.23 kg/m²         Physical Exam  Vitals and nursing note reviewed.   Constitutional:       General: She is not in acute distress.     Appearance: She is well-developed.   HENT:      Head: Normocephalic.      Nose: Nose normal.      Mouth/Throat:      Mouth: Mucous membranes are moist.   Eyes:      Conjunctiva/sclera: Conjunctivae normal.   Cardiovascular:      Rate and Rhythm: Normal rate and regular rhythm.      Heart sounds: No murmur heard.  Pulmonary:      Effort: Pulmonary effort is normal. No respiratory distress.      Breath sounds: Normal breath sounds.   Abdominal:      General: There is no distension.      Palpations: Abdomen is soft.      Tenderness: There is no abdominal tenderness.   Musculoskeletal:         General: No tenderness. Normal range of motion.      Cervical back: Normal range of motion and neck supple.   Skin:     General: Skin is warm and dry.      Capillary Refill: Capillary refill takes less than 2 seconds.      Findings: No rash.   Neurological:      General: No focal deficit present.      Mental Status: She is alert and oriented to person, place, and time.      Cranial Nerves: No cranial nerve deficit.      Sensory: No sensory deficit.      Motor: No weakness.      Coordination: Coordination normal.               ED Course     Labs Reviewed   BASIC METABOLIC PANEL (8) - Abnormal; Notable for the following components:       Result Value    Glucose 102 (*)      Sodium 118 (*)     Chloride 87 (*)     Calculated Osmolality 245 (*)     All other components within normal limits   URINALYSIS, ROUTINE - Abnormal; Notable for the following components:    Urine Color Colorless (*)     Blood Urine Trace (*)     Bacteria Urine Rare (*)     Squamous Epi. Cells Few (*)     All other components within normal limits   OSMOLALITY, URINE - Abnormal; Notable for the following components:    Osmolality Urine 215 (*)     All other components within normal limits   OSMOLALITY, SERUM - Abnormal; Notable for the following components:    Osmolality Serum 252 (*)     All other components within normal limits   URIC ACID - Abnormal; Notable for the following components:    Uric Acid 2.2 (*)     All other components within normal limits   SODIUM, SERUM - Abnormal; Notable for the following components:    Sodium 123 (*)     All other components within normal limits   DRUG ABUSE PANEL 10 SCREEN - Abnormal; Notable for the following components:    Benzodiazepines Urine Presumed Positive (*)     All other components within normal limits   SODIUM, SERUM - Abnormal; Notable for the following components:    Sodium 124 (*)     All other components within normal limits   SODIUM, SERUM - Abnormal; Notable for the following components:    Sodium 122 (*)     All other components within normal limits   SODIUM, SERUM - Abnormal; Notable for the following components:    Sodium 123 (*)     All other components within normal limits   SODIUM, SERUM - Abnormal; Notable for the following components:    Sodium 121 (*)     All other components within normal limits   BASIC METABOLIC PANEL (8) - Abnormal; Notable for the following components:    Glucose 104 (*)     Sodium 122 (*)     Potassium 3.1 (*)     Chloride 91 (*)     Calculated Osmolality 254 (*)     All other components within normal limits   POCT GLUCOSE - Abnormal; Notable for the following components:    POC Glucose  155 (*)     All other components within  normal limits   POCT GLUCOSE - Abnormal; Notable for the following components:    POC Glucose  117 (*)     All other components within normal limits   POCT GLUCOSE - Abnormal; Notable for the following components:    POC Glucose  180 (*)     All other components within normal limits   CBC W/ DIFFERENTIAL - Abnormal; Notable for the following components:    HCT 34.6 (*)     MCV 78.8 (*)     .0 (*)     All other components within normal limits   CBC W/ DIFFERENTIAL - Abnormal; Notable for the following components:    HCT 34.3 (*)     MCV 79.6 (*)     .0 (*)     All other components within normal limits   TSH W REFLEX TO FREE T4 - Normal   MAGNESIUM - Normal   OSMOLALITY, URINE - Normal   POCT GLUCOSE - Normal   CBC WITH DIFFERENTIAL WITH PLATELET    Narrative:     The following orders were created for panel order CBC With Differential With Platelet.  Procedure                               Abnormality         Status                     ---------                               -----------         ------                     CBC W/ DIFFERENTIAL[[id number]]          Abnormal            Final result                 Please view results for these tests on the individual orders.   CREATININE, URINE, RANDOM   SODIUM, URINE, RANDOM   CORTISOL   CBC WITH DIFFERENTIAL WITH PLATELET    Narrative:     The following orders were created for panel order CBC With Differential With Platelet.  Procedure                               Abnormality         Status                     ---------                               -----------         ------                     CBC W/ DIFFERENTIAL[[id number]]          Abnormal            Final result                 Please view results for these tests on the individual orders.   SODIUM, URINE, RANDOM   SODIUM, SERUM   SODIUM, SERUM   SODIUM, SERUM   RAINBOW DRAW LAVENDER     EKG    Rate, intervals and axes as noted on EKG Report.  Rate: 71 bpm  Rhythm: Sinus Rhythm  Reading: LVH with strain  abnormal                          MDM        Admission disposition: 4/6/2024  3:39 PM                      Medical Decision Making  Differential diagnosis considered for SIADH, medication reaction, hyponatremia, dehydration,    Problems Addressed:  Hyponatremia: acute illness or injury    Amount and/or Complexity of Data Reviewed  Labs: ordered. Decision-making details documented in ED Course.     Details: Significant hyponatremia is present.  Discussion of management or test interpretation with external provider(s): Patient was seen by nephrology in the emergency department.  Will start on oral sodium tablets and recheck sodium in 4 hours.    Risk  Decision regarding hospitalization.        Disposition and Plan     Clinical Impression:  1. Hyponatremia         Disposition:  Admit  4/6/2024  3:39 pm    Follow-up:  No follow-up provider specified.        Medications Prescribed:  Current Discharge Medication List                            Hospital Problems       Present on Admission  Date Reviewed: 8/16/2022            ICD-10-CM Noted POA    * (Principal) Hyponatremia E87.1 4/30/2021 Unknown    Generalized anxiety disorder F41.1 4/7/2024 Unknown    Hyperglycemia R73.9 4/6/2024 Yes

## 2024-04-06 NOTE — ED INITIAL ASSESSMENT (HPI)
To ED with low sodium level. Patient had her blood drawn about an hour ago and was told her sodium was 119    BP at home was 190/98. Patient took lisinopril 10mg for BP at 0800. Took additional dose at 1330

## 2024-04-06 NOTE — ED QUICK NOTES
Orders for admission, patient is aware of plan and ready to go upstairs. Any questions, please call ED RN Emmy at extension [phone].     Patient Covid vaccination status: Partially vaccinated     COVID Test Ordered in ED: None    COVID Suspicion at Admission: N/A    Running Infusions:  None    Mental Status/LOC at time of transport: Aox4     Other pertinent information:   CIWA score: N/A   NIH score:  N/A

## 2024-04-06 NOTE — ED QUICK NOTES
admits blood work d/t concerns as patient has had abnormal gait, slower responses, and not acting per self.   Patient reports fatigue.  Patient is able to ambulate independently, gait noted to be slow

## 2024-04-06 NOTE — H&P
[facility]  part of [facility]  HISTORY AND PHYSICAL       Scar Cline Patient Status:  Emergency    1965  58 year old CSN [id number]   Location 45/45 Attending Asif Harvey MD     PCP Derian Benjamin MD         DATE OF ADMISSION: 2024     CHIEF COMPLAINT: Generalized weakness, unsteady     HISTORY OF PRESENT ILLNESS  This is a 58 year oldfemale who was sent in for evaluation of generalized weakness and feeling unsteady.  Patient states symptoms have been progressively worsening over the past week or so.  Patient went to see her primary care physician who ordered blood work.  Patient was found to have low sodium and recommended to present to the ED for further evaluation.  At time of interview, patient reported still feeling generally weak.  Stated she was attempting to work out earlier in the day and was feeling unsteady as if she was going to fall prompting her to stop and rest.  Upon further review, patient stated she was started on trileptal about 3 weeks prior.  Patient otherwise denies chest pain, shortness of breath, abdominal pain, nausea vomiting, fevers or chills.    PAST MEDICAL HISTORY  Past Medical History:   Diagnosis Date    Asplenia after surgical procedure     Breast CA (HCC) 2019    right breast-bilat mastectomy    CMV hepatitis (HCC)     Congestive heart disease (HCC)     dx 2019 dec-resolved now    COVID-19     positive 3/2020 - hospitalized-has had negative nasal swabs , last week last one    Gastroparesis     High blood pressure     Hodgkin's disease (HCC)     3B-chemo    Insulin dependent type 2 diabetes mellitus (HCC) 2019    Neuropathy     R arm, hand, legs    Pancreatic insufficiency (HCC)     Personal history of antineoplastic chemotherapy     Pneumococcal sepsis (HCC) 1994    sepsis R/T pneumococcal bacteria     Serratia 2005    Stress fracture of hip 2013    hip pinning    Type 1 diabetes mellitus (HCC)     Type  1-C    Visual impairment     WEARS GLASSES/CONTACT        PAST SURGICAL HISTORY  Past Surgical History:   Procedure Laterality Date    APPENDECTOMY  1992    CHEMOTHERAPY  1984    Hodgkin;s disease-3B    COLONOSCOPY  03/21/2014    COLONOSCOPY N/A 10/19/2019    Procedure: COLONOSCOPY;  Surgeon: Eric De Jesus MD;  Location: [facility] ENDOSCOPY    COLONOSCOPY N/A 07/18/2020    Procedure: COLONOSCOPY;  Surgeon: Eric De Jesus MD;  Location: [facility] ENDOSCOPY    FRACTURE SURGERY Right 09/05/2013    Right femoral neck stress fracture percutaneous pinning    HIP SURGERY Left 2010    left, no pins    IMPLANT LEFT  12/28/2020    IMPLANT RIGHT Bilateral 12/28/2020    Removal of bilateral silicone breast implants; Placement of permanent implants,  Autologous fat grafting from abdomen, flanks, and inner thighs    FELISHA LOCALIZATION WIRE 1 SITE RIGHT (CPT=19281)      1994    MASTECTOMY LEFT      MASTECTOMY RIGHT  12/23/2019    Bilateral skin sparing mastectomy with right breast injection    REMOVE TISSUE EXPANDER(S)  08/06/2020    Removal of bilateral tissue expanders,  Placement of permanent silicone gel implant...    REMOVE TISSUE EXPANDER(S) Left 06/18/2020    Removal of left breast tissue expander. Evacuation and washout of left breast hematoma....    REMOVE TISSUE EXPANDER(S) Left 02/29/2020    Left breast tissue expander removal. Irrigation and washout, left breast. Complete capsulectomy, left breast    SPLENECTOMY  1984       ALLERGIES   Iodinated diagnostic agents [radiology contrast iodinated dyes], Penicillins, and Scopolamine    MEDICATIONS  Patient's Medications   New Prescriptions    No medications on file   Previous Medications    ACETAMINOPHEN 500 MG ORAL TAB    Take 2 tablets (1,000 mg total) by mouth 2 (two) times daily as needed for Pain.    DILTIAZEM HCL ER COATED BEADS (CARTIA XT) 180 MG ORAL CAPSULE SR 24 HR    Take 1 capsule (180 mg total) by mouth daily.    DIVALPROEX ER (DEPAKOTE ER) 500 MG ORAL TABLET  24 HR    Take 1 tablet (500 mg total) by mouth nightly as needed.    FUROSEMIDE 20 MG ORAL TAB    Take 1 tablet (20 mg total) by mouth daily.    IBUPROFEN 800 MG ORAL TAB    Take 1 tablet (800 mg total) by mouth 2 (two) times daily as needed for Pain.    INSULIN GLARGINE 100 UNIT/ML SUBCUTANEOUS SOLUTION    Inject into the skin as needed.    LISINOPRIL 10 MG ORAL TAB    Take 1 tablet (10 mg total) by mouth in the morning and 1 tablet (10 mg total) before bedtime.    OMEPRAZOLE 20 MG ORAL CAPSULE DELAYED RELEASE    Take 1 capsule (20 mg total) by mouth 2 (two) times daily before meals.   Modified Medications    No medications on file   Discontinued Medications    No medications on file       SOCIAL HISTORY  Social History     Socioeconomic History    Marital status:    Occupational History    Occupation:    Tobacco Use    Smoking status: Never    Smokeless tobacco: Never   Vaping Use    Vaping Use: Never used   Substance and Sexual Activity    Alcohol use: No    Drug use: No   Other Topics Concern    Caffeine Concern Yes     Comment: coffee-1 cup/day    Pt has a pacemaker No    Pt has a defibrillator No    Reaction to local anesthetic No       FAMILY HISTORY  Family History   Problem Relation Age of Onset    Glaucoma Mother     Hypertension Mother     Heart Disease Father         CAD    Hypertension Father     Other (Other) Father         polymyalgia rheumatic    Breast Cancer Maternal Grandmother 80        post menopausal    Genetic Disease Other 5        Neurofibromatosis    Other (non Hodgkin's lymphoma follicular) Sister 53    Other (epilepsy) Son     No Known Problems Daughter     Cancer Self         hodgkins 1994    Diabetes Neg        PHYSICAL EXAM  Vital signs:  BP (!) 160/92   Pulse 65   Temp 98.7 °F (37.1 °C) (Temporal)   Resp 16   Ht 5' 7\" (1.702 m)   Wt 159 lb (72.1 kg)   SpO2 95%   BMI 24.90 kg/m²      GENERAL:  Awake and alert, in no acute distress.  HEART:  Regular rhythm.   Regular rate   LUNGS:  Air entry was good.  No crackles or wheezes   ABDOMEN: Soft and non-tender.  NEUROLOGICAL: Awake and alert, some slowness in response, generalized weakness.    SKIN:  Warm and well perfused  PSYCHIATRIC: Normal mood      IMAGING  No results found.    Data:  Recent Labs   Lab 04/06/24  1504   RBC 4.39   HGB 12.3   HCT 34.6*   MCV 78.8*   MCH 28.0   MCHC 35.5   RDW 13.8   NEPRELIM 3.91   WBC 8.1   .0*     Recent Labs   Lab 04/06/24  1302 04/06/24  1504   * 102*   BUN 9 9   CREATSERUM 0.77 0.76   CA 9.6 9.8   * 118*   K 4.0 3.6   CL 88* 87*   CO2 24.0 25.0       ASSESSMENT/PLAN      Hyponatremia  -Patient presenting with generalized weakness, unsteady gait  -Sodium noted to be 118 on admission  -Concern for SIADH  -Patient previously started on Trileptal, correlation with hyponatremia, holding at this time  -Due to SIADH, starting fluid restriction.  -Started on salt tabs  -Close monitoring of sodium, every 4 hour check with goal correction of 8 mill equivalents in 24 hours.  -Nephrology on consult, recommend holding tolvaptan at this time.  If fails to improve can consider adding.  Appreciate further recommendations  -Check TSH and cortisol  -Continue to monitor    HTN  - controlled  - CPM  - Monitor and adjust as needed      Hx of Breast CA        Plan of care discussed with patient at bedside.  Discussed with Nephrology consultant, ED physician and RN. Decision made that pt needs hospitalization for further management/monitoring.      Barry Dumont MD    This note was prepared using Dragon Medical voice recognition dictation software. As a result errors may occur. When identified these errors have been corrected. While every attempt is made to correct errors during dictation discrepancies may still exist

## 2024-04-07 PROBLEM — F41.1 GENERALIZED ANXIETY DISORDER: Status: ACTIVE | Noted: 2024-04-07

## 2024-04-07 LAB
ALBUMIN SERPL-MCNC: 4.9 G/DL (ref 3.2–4.8)
ALP LIVER SERPL-CCNC: 120 U/L
ALT SERPL-CCNC: 28 U/L
ANION GAP SERPL CALC-SCNC: 6 MMOL/L (ref 0–18)
AST SERPL-CCNC: 34 U/L (ref ?–34)
ATRIAL RATE: 71 BPM
BASOPHILS # BLD AUTO: 0.12 X10(3) UL (ref 0–0.2)
BASOPHILS NFR BLD AUTO: 1.8 %
BILIRUB DIRECT SERPL-MCNC: <0.1 MG/DL (ref ?–0.3)
BILIRUB SERPL-MCNC: 0.3 MG/DL (ref 0.3–1.2)
BUN BLD-MCNC: 11 MG/DL (ref 9–23)
BUN/CREAT SERPL: 12.9 (ref 10–20)
CALCIUM BLD-MCNC: 10.1 MG/DL (ref 8.7–10.4)
CHLORIDE SERPL-SCNC: 91 MMOL/L (ref 98–112)
CO2 SERPL-SCNC: 25 MMOL/L (ref 21–32)
CORTIS SERPL-MCNC: 22.8 UG/DL
CREAT BLD-MCNC: 0.85 MG/DL
DEPRECATED RDW RBC AUTO: 40.8 FL (ref 35.1–46.3)
EGFRCR SERPLBLD CKD-EPI 2021: 79 ML/MIN/1.73M2 (ref 60–?)
EOSINOPHIL # BLD AUTO: 0.11 X10(3) UL (ref 0–0.7)
EOSINOPHIL NFR BLD AUTO: 1.6 %
ERYTHROCYTE [DISTWIDTH] IN BLOOD BY AUTOMATED COUNT: 13.9 % (ref 11–15)
GLUCOSE BLD-MCNC: 104 MG/DL (ref 70–99)
GLUCOSE BLDC GLUCOMTR-MCNC: 107 MG/DL (ref 70–99)
GLUCOSE BLDC GLUCOMTR-MCNC: 117 MG/DL (ref 70–99)
GLUCOSE BLDC GLUCOMTR-MCNC: 120 MG/DL (ref 70–99)
GLUCOSE BLDC GLUCOMTR-MCNC: 180 MG/DL (ref 70–99)
HCT VFR BLD AUTO: 34.3 %
HGB BLD-MCNC: 12.3 G/DL
IMM GRANULOCYTES # BLD AUTO: 0.01 X10(3) UL (ref 0–1)
IMM GRANULOCYTES NFR BLD: 0.1 %
LYMPHOCYTES # BLD AUTO: 3.39 X10(3) UL (ref 1–4)
LYMPHOCYTES NFR BLD AUTO: 50.2 %
MAGNESIUM SERPL-MCNC: 1.8 MG/DL (ref 1.6–2.6)
MCH RBC QN AUTO: 28.5 PG (ref 26–34)
MCHC RBC AUTO-ENTMCNC: 35.9 G/DL (ref 31–37)
MCV RBC AUTO: 79.6 FL
MONOCYTES # BLD AUTO: 0.94 X10(3) UL (ref 0.1–1)
MONOCYTES NFR BLD AUTO: 13.9 %
NEUTROPHILS # BLD AUTO: 2.18 X10 (3) UL (ref 1.5–7.7)
NEUTROPHILS # BLD AUTO: 2.18 X10(3) UL (ref 1.5–7.7)
NEUTROPHILS NFR BLD AUTO: 32.4 %
OSMOLALITY SERPL CALC.SUM OF ELEC: 254 MOSM/KG (ref 275–295)
OSMOLALITY UR: 561 MOSM/KG (ref 300–1100)
P AXIS: 47 DEGREES
P-R INTERVAL: 172 MS
PLATELET # BLD AUTO: 663 10(3)UL (ref 150–450)
POTASSIUM SERPL-SCNC: 3.1 MMOL/L (ref 3.5–5.1)
PROT SERPL-MCNC: 7.9 G/DL (ref 5.7–8.2)
Q-T INTERVAL: 392 MS
QRS DURATION: 82 MS
QTC CALCULATION (BEZET): 425 MS
R AXIS: -3 DEGREES
RBC # BLD AUTO: 4.31 X10(6)UL
SODIUM SERPL-SCNC: 121 MMOL/L (ref 136–145)
SODIUM SERPL-SCNC: 122 MMOL/L (ref 136–145)
SODIUM SERPL-SCNC: 122 MMOL/L (ref 136–145)
SODIUM SERPL-SCNC: 123 MMOL/L (ref 136–145)
SODIUM SERPL-SCNC: 124 MMOL/L (ref 136–145)
SODIUM SERPL-SCNC: 125 MMOL/L (ref 136–145)
SODIUM SERPL-SCNC: 126 MMOL/L (ref 136–145)
SODIUM SERPL-SCNC: 127 MMOL/L (ref 136–145)
SODIUM SERPL-SCNC: 25 MMOL/L
T AXIS: 58 DEGREES
TSI SER-ACNC: 1.28 MIU/ML (ref 0.55–4.78)
VENTRICULAR RATE: 71 BPM
WBC # BLD AUTO: 6.8 X10(3) UL (ref 4–11)

## 2024-04-07 PROCEDURE — 99233 SBSQ HOSP IP/OBS HIGH 50: CPT | Performed by: OTHER

## 2024-04-07 PROCEDURE — 99232 SBSQ HOSP IP/OBS MODERATE 35: CPT | Performed by: INTERNAL MEDICINE

## 2024-04-07 PROCEDURE — 99233 SBSQ HOSP IP/OBS HIGH 50: CPT | Performed by: HOSPITALIST

## 2024-04-07 RX ORDER — TRAZODONE HYDROCHLORIDE 50 MG/1
25 TABLET ORAL NIGHTLY
Status: DISCONTINUED | OUTPATIENT
Start: 2024-04-07 | End: 2024-04-08

## 2024-04-07 RX ORDER — MAGNESIUM OXIDE 400 MG/1
400 TABLET ORAL ONCE
Status: COMPLETED | OUTPATIENT
Start: 2024-04-07 | End: 2024-04-07

## 2024-04-07 RX ORDER — SODIUM CHLORIDE 1 G/1
1 TABLET ORAL ONCE
Status: DISCONTINUED | OUTPATIENT
Start: 2024-04-07 | End: 2024-04-07

## 2024-04-07 RX ORDER — SODIUM CHLORIDE 9 MG/ML
INJECTION, SOLUTION INTRAVENOUS CONTINUOUS
Status: DISCONTINUED | OUTPATIENT
Start: 2024-04-07 | End: 2024-04-08

## 2024-04-07 RX ORDER — POTASSIUM CHLORIDE 20 MEQ/1
40 TABLET, EXTENDED RELEASE ORAL ONCE
Status: COMPLETED | OUTPATIENT
Start: 2024-04-07 | End: 2024-04-07

## 2024-04-07 RX ORDER — SODIUM CHLORIDE 1 G/1
1 TABLET ORAL ONCE
Status: COMPLETED | OUTPATIENT
Start: 2024-04-07 | End: 2024-04-07

## 2024-04-07 RX ORDER — SODIUM CHLORIDE 9 MG/ML
INJECTION, SOLUTION INTRAVENOUS CONTINUOUS
Status: ACTIVE | OUTPATIENT
Start: 2024-04-07 | End: 2024-04-07

## 2024-04-07 RX ORDER — CHLORDIAZEPOXIDE HYDROCHLORIDE 5 MG/1
5 CAPSULE, GELATIN COATED ORAL 2 TIMES DAILY PRN
Status: DISCONTINUED | OUTPATIENT
Start: 2024-04-07 | End: 2024-04-08

## 2024-04-07 RX ORDER — DILTIAZEM HYDROCHLORIDE 180 MG/1
180 CAPSULE, EXTENDED RELEASE ORAL 2 TIMES DAILY
Status: DISCONTINUED | OUTPATIENT
Start: 2024-04-07 | End: 2024-04-08

## 2024-04-07 RX ORDER — DIAZEPAM 5 MG/1
5 TABLET ORAL NIGHTLY
Status: DISCONTINUED | OUTPATIENT
Start: 2024-04-07 | End: 2024-04-08

## 2024-04-07 NOTE — PLAN OF CARE
Problem: Patient Centered Care  Goal: Patient preferences are identified and integrated in the patient's plan of care  Description: Interventions:  - What would you like us to know as we care for you?   - Provide timely, complete, and accurate information to patient/family  - Incorporate patient and family knowledge, values, beliefs, and cultural backgrounds into the planning and delivery of care  - Encourage patient/family to participate in care and decision-making at the level they choose  - Honor patient and family perspectives and choices  Outcome: Progressing     Patient stable upon arrival to unit. Resting comfortably in bed and eat dinner. Safety precautions in place. Call light is within reach.

## 2024-04-07 NOTE — PROGRESS NOTES
[facility]  part of [facility]    Progress Note    Scar Cline Patient Status:  Inpatient    1965 MRN [id number]   Location [facility] 5SW/SE Attending Héctor Tenorio MD   Hosp Day # 1 PCP Derian Benjamin MD       Subjective:   Scar Cline is a(n) 58 year old female who I am seeing for Hyponatremia    Feels better  More alert    Objective:   /81 (BP Location: Right arm)   Pulse 67   Temp 98.2 °F (36.8 °C) (Oral)   Resp 18   Ht 5' 7\" (1.702 m)   Wt 161 lb 1.6 oz (73.1 kg)   SpO2 99%   BMI 25.23 kg/m²      Intake/Output Summary (Last 24 hours) at 2024 0955  Last data filed at 2024 0600  Gross per 24 hour   Intake 250 ml   Output 200 ml   Net 50 ml     Wt Readings from Last 1 Encounters:   24 161 lb 1.6 oz (73.1 kg)       Exam  Vital signs: Blood pressure 137/81, pulse 67, temperature 98.2 °F (36.8 °C), temperature source Oral, resp. rate 18, height 5' 7\" (1.702 m), weight 161 lb 1.6 oz (73.1 kg), SpO2 99%.    General: No acute distress. Alert and oriented x 3.  HEENT: Moist mucous membranes. EOMI. PERRLA  Neck:  No JVD.   Respiratory: Clear to auscultation bilaterally.    Cardiovascular: S1, S2.  Regular rate and rhythm.   Abdomen: Soft, nontender, nondistended.    Neurologic: No focal neurological deficits.  Musculoskeletal: Full range of motion of all extremities.  No swelling noted.  Access:    Results:     Recent Labs   Lab 24  1504 24  0651   RBC 4.39 4.31   HGB 12.3 12.3   HCT 34.6* 34.3*   MCV 78.8* 79.6*   MCH 28.0 28.5   MCHC 35.5 35.9   RDW 13.8 13.9   NEPRELIM 3.91 2.18   WBC 8.1 6.8   .0* 663.0*         Recent Labs   Lab 24  1302 24  1504 24  2000 24  2359 24  0651 24  0749   * 102*  --   --  104*  --    BUN 9 9  --   --  11  --    CREATSERUM 0.77 0.76  --   --  0.85  --    CA 9.6 9.8  --   --  10.1  --    * 118*   < > 124* 122*  122* 123*   K 4.0 3.6   --   --  3.1*  --    CL 88* 87*  --   --  91*  --    CO2 24.0 25.0  --   --  25.0  --     < > = values in this interval not displayed.          No results found.    Assessment and Plan:     57 y/o F with h/o diabetes, HTN, breast ca, hodgkin's lymphoma , with episode of hyponatremia in dec ( thought sec to hydrochlorothiazide and Depakote and was stopped at dc) had blood work done earlier today for unstable gait, and not feeling her usual self. Labs showed sodium of 119 and came to ER.    Here repeat sodium is 118, serum uric acid 2.2 , urine osm pending, urine sodium is 61  3 weeks back started trileptal  ( known to cause siadh)   States no other new medications. Fluid intake has been reasonable    Impression:    Hyponatremia: urine studies suggestive of SIADH , likely cause being trileptal . Will plan for holding it and change to some other medication. FR (1.2 L/day) + inc solute intake+ salt tabs . Will have sodium check q 4 hrs. Aim to correct sodium 8 meq in next 24 hrs. May need to use tolvaptan if improvement not seen. TSH ok , cortisol pending    HTN : well controlled on lisinopril        Plan:    Continue salt tabs, + replace k  FR 1.2 L /day  So far sodium improved appropriately -6 to 8 mq in first 24 hrs  Replace K      Thank you very much for allowing me to participate in the care of your patient.  If you have any questions, please do not hesitate to contact me.     Raquel Pérez MD  4/7/2024

## 2024-04-07 NOTE — CONSULTS
[facility]  part of [facility]    Report of Endocrinology Consultation  [facility]    Scar Cline Patient Status:  Inpatient    1965 MRN [id number]   Location [facility] 5SW/SE Attending Héctor Tenorio MD   Hosp Day # 1 PCP Derian Benjamin MD     Date of Admission:  2024  Date of Consult:  2024    Reason for Consultation:  Type 1 DM on insulin pump    History of Present Illness:  Scar Cline is a a(n) 58 year old female with PMHx of Type 1 DM using Tandem Tslim X2 insulin pump with Dexcom G6 CGM at home (using insulin pump for many years) who likely still has some insulin production of her own, prior treatment for Hodgkins Disease and breast cancer with chemotherapy, pancreatic insufficiency, and anxiety is admitted to the hospital with increased unsteadiness and some generalized weakness over the past week.  Na levels were low on outpatient doctor evaluation earlier this week and she is admitted with Na level at 118-119.  She had recently been changed in the outpatient to Trileptal.  She removed he insulin pump prior to the admission yesterday as she was having frequent hypoglycemia.    History:  Past Medical History:   Diagnosis Date    Asplenia after surgical procedure     Breast CA (HCC)     right breast-bilat mastectomy    CMV hepatitis (HCC)     Congestive heart disease (HCC)     dx 2019 dec-resolved now    COVID-19     positive 3/2020 - hospitalized-has had negative nasal swabs , last week last one    Gastroparesis     High blood pressure     Hodgkin's disease (HCC)     3B-chemo    Insulin dependent type 2 diabetes mellitus (HCC) 2019    Neuropathy     R arm, hand, legs    Pancreatic insufficiency (HCC)     Personal history of antineoplastic chemotherapy 1984    Pneumococcal sepsis (HCC) 1994    sepsis R/T pneumococcal bacteria     Serratia 2005    Stress fracture of hip 2013    hip pinning    Type  Goal Outcome Evaluation:      Plan of Care Reviewed With: patient    No acute changes this shift-      A&Ox4. CMS intact. Denies numbness/tingling, nausea,vomiting, SOB, and chest pain.  Up independently. Voiding adequate amounts.  C/o body aches. Tylenol administered w/ relief.   Denies SI this shift- sitter at the bedside.      Will continue with POC and monitor.     1 diabetes mellitus (HCC)     Type 1-C    Visual impairment     WEARS GLASSES/CONTACT     Past Surgical History:   Procedure Laterality Date    APPENDECTOMY  1992    CHEMOTHERAPY  1984    Hodgkin;s disease-3B    COLONOSCOPY  03/21/2014    COLONOSCOPY N/A 10/19/2019    Procedure: COLONOSCOPY;  Surgeon: Eric De Jesus MD;  Location: [facility] ENDOSCOPY    COLONOSCOPY N/A 07/18/2020    Procedure: COLONOSCOPY;  Surgeon: Eric De Jesus MD;  Location: [facility] ENDOSCOPY    FRACTURE SURGERY Right 09/05/2013    Right femoral neck stress fracture percutaneous pinning    HIP SURGERY Left 2010    left, no pins    IMPLANT LEFT  12/28/2020    IMPLANT RIGHT Bilateral 12/28/2020    Removal of bilateral silicone breast implants; Placement of permanent implants,  Autologous fat grafting from abdomen, flanks, and inner thighs    FELISHA LOCALIZATION WIRE 1 SITE RIGHT (CPT=19281)      1994    MASTECTOMY LEFT      MASTECTOMY RIGHT  12/23/2019    Bilateral skin sparing mastectomy with right breast injection    REMOVE TISSUE EXPANDER(S)  08/06/2020    Removal of bilateral tissue expanders,  Placement of permanent silicone gel implant...    REMOVE TISSUE EXPANDER(S) Left 06/18/2020    Removal of left breast tissue expander. Evacuation and washout of left breast hematoma....    REMOVE TISSUE EXPANDER(S) Left 02/29/2020    Left breast tissue expander removal. Irrigation and washout, left breast. Complete capsulectomy, left breast    SPLENECTOMY  1984     Family History   Problem Relation Age of Onset    Glaucoma Mother     Hypertension Mother     Heart Disease Father         CAD    Hypertension Father     Other (Other) Father         polymyalgia rheumatic    Breast Cancer Maternal Grandmother 80        post menopausal    Genetic Disease Other 5        Neurofibromatosis    Other (non Hodgkin's lymphoma follicular) Sister 53    Other (epilepsy) Son     No Known Problems Daughter     Cancer Self         hodgkins 1994    Diabetes Neg        reports that she has never smoked. She has never used smokeless tobacco. She reports that she does not drink alcohol and does not use drugs.    Allergies:  Allergies   Allergen Reactions    Iodinated Diagnostic Agents [Radiology Contrast Iodinated Dyes] HIVES     Other reaction(s): IODINATED CONTRAST MEDIA - IV DYE    Penicillins OTHER (SEE COMMENTS)     MOUTH SORES    Scopolamine OTHER (SEE COMMENTS)     Very confused       Medications:    Current Facility-Administered Medications:     dilTIAZem ER (CardIZEM CD) 24 hr cap 180 mg, 180 mg, Oral, BID    sodium chloride tab 1 g, 1 g, Oral, Daily    lisinopril (Zestril) tab 10 mg, 10 mg, Oral, BID    pantoprazole (Protonix) DR tab 40 mg, 40 mg, Oral, QAM AC    glucose (Dex4) 15 GM/59ML oral liquid 15 g, 15 g, Oral, Q15 Min PRN **OR** glucose (Glutose) 40% oral gel 15 g, 15 g, Oral, Q15 Min PRN **OR** glucose-vitamin C (Dex-4) chewable tab 4 tablet, 4 tablet, Oral, Q15 Min PRN **OR** dextrose 50% injection 50 mL, 50 mL, Intravenous, Q15 Min PRN **OR** glucose (Dex4) 15 GM/59ML oral liquid 30 g, 30 g, Oral, Q15 Min PRN **OR** glucose (Glutose) 40% oral gel 30 g, 30 g, Oral, Q15 Min PRN **OR** glucose-vitamin C (Dex-4) chewable tab 8 tablet, 8 tablet, Oral, Q15 Min PRN    enoxaparin (Lovenox) 40 MG/0.4ML SUBQ injection 40 mg, 40 mg, Subcutaneous, Daily    acetaminophen (Tylenol Extra Strength) tab 500 mg, 500 mg, Oral, Q4H PRN    acetaminophen (Tylenol) tab 650 mg, 650 mg, Oral, Q4H PRN **OR** [DISCONTINUED] HYDROcodone-acetaminophen (Norco) 5-325 MG per tab 1 tablet, 1 tablet, Oral, Q4H PRN **OR** [DISCONTINUED] HYDROcodone-acetaminophen (Norco) 5-325 MG per tab 2 tablet, 2 tablet, Oral, Q4H PRN    temazepam (Restoril) cap 15 mg, 15 mg, Oral, Nightly PRN    polyethylene glycol (PEG 3350) (Miralax) 17 g oral packet 17 g, 17 g, Oral, Daily PRN    sennosides (Senokot) tab 17.2 mg, 17.2 mg, Oral, Nightly PRN    bisacodyl (Dulcolax) 10 MG rectal suppository 10 mg, 10 mg,  Rectal, Daily PRN    fleet enema (Fleet) 7-19 GM/118ML rectal enema 133 mL, 1 enema, Rectal, Once PRN    ondansetron (Zofran) 4 MG/2ML injection 4 mg, 4 mg, Intravenous, Q6H PRN    insulin aspart (NovoLOG) 100 Units/mL FlexPen 1-5 Units, 1-5 Units, Subcutaneous, TID CC and HS    ROS:  Constitutional: negative  Eyes: negative  Ears, nose, mouth, throat, and face: negative  Respiratory: negative  Cardiovascular: negative  Gastrointestinal: negative  Genitourinary:negative  Integument/breast: negative  Hematologic/lymphatic: negative  Musculoskeletal:negative  Neurological: negative  Behavioral/Psych: negative  Endocrine: negative  Allergic/Immunologic: negative    Physical Exam:  Blood pressure 137/81, pulse 67, temperature 98.2 °F (36.8 °C), temperature source Oral, resp. rate 18, height 5' 7\" (1.702 m), weight 161 lb 1.6 oz (73.1 kg), SpO2 99%.    General: Alert, orientated x3.  Cooperative.  No apparent distress.  HEENT: EOMI, thyroid non-enlarged   Neck: Supple.  Lungs: Clear bilaterally.  Cardiac: Regular rate and rhythm.  Abdomen:  Bowel sounds present, normoactive.  Nontender.  Extremities:  No lower extremity edema noted, DP +2 b/l, skin intact  Skin: Normal texture and turgor.  Lymphatic:  No cervical lymphadenopathy.    Labs, Imaging and Ancillaries:    Lab Results   Component Value Date    WBC 6.8 04/07/2024    HGB 12.3 04/07/2024    HCT 34.3 04/07/2024    .0 04/07/2024    CREATSERUM 0.85 04/07/2024    BUN 11 04/07/2024     04/07/2024    K 3.1 04/07/2024    CL 91 04/07/2024    CO2 25.0 04/07/2024     04/07/2024    CA 10.1 04/07/2024    TSH 1.277 04/07/2024    MG 1.8 04/07/2024       Recent Labs   Lab 04/06/24  1302 04/06/24  1504 04/07/24  0651   * 102* 104*       No results found.      Impression and Plan:  Type 1 DM ---  she has minimal insulin requirements and is off insulin pump since prior to coming to ER yesterday afternoon; she likely has some minimal persistent insulin  production  Severe hyponatremia --- improving  History of breast cancer  History of Hodgkins lymphoma    Called to see patient as she has insulin pump for treatment of her diabetes for many  years  She has very minimal insulin requirements likely reflecting some minimal autonomous insulin production of her own;  she has not yet required any insulin during the hospital stay of 24 hours even after removing her insulin pump yesterday afternoon    Her glucose levels yesterday and this morning were excellent, but have risen some today by lunch time to 180  Patient is reluctant to restart her insulin pump or take insulin shots at this time as she is fearful of hypoglycemia  Will closely monitor her BS and if rising above 200 she agrees to restart insulin with injections here in the hospital at low doses    PIA THOMPSON MD

## 2024-04-07 NOTE — PLAN OF CARE
Problem: Patient Centered Care  Goal: Patient preferences are identified and integrated in the patient's plan of care  Description: Interventions:  - What would you like us to know as we care for you? From home with   - Provide timely, complete, and accurate information to patient/family  - Incorporate patient and family knowledge, values, beliefs, and cultural backgrounds into the planning and delivery of care  - Encourage patient/family to participate in care and decision-making at the level they choose  - Honor patient and family perspectives and choices  Outcome: Progressing     Problem: PAIN - ADULT  Goal: Verbalizes/displays adequate comfort level or patient's stated pain goal  Description: INTERVENTIONS:  - Encourage pt to monitor pain and request assistance  - Assess pain using appropriate pain scale  - Administer analgesics based on type and severity of pain and evaluate response  - Implement non-pharmacological measures as appropriate and evaluate response  - Consider cultural and social influences on pain and pain management  - Manage/alleviate anxiety  - Utilize distraction and/or relaxation techniques  - Monitor for opioid side effects  - Notify MD/LIP if interventions unsuccessful or patient reports new pain  - Anticipate increased pain with activity and pre-medicate as appropriate  Outcome: Progressing     Problem: SAFETY ADULT - FALL  Goal: Free from fall injury  Description: INTERVENTIONS:  - Assess pt frequently for physical needs  - Identify cognitive and physical deficits and behaviors that affect risk of falls.  - [place] fall precautions as indicated by assessment.  - Educate pt/family on patient safety including physical limitations  - Instruct pt to call for assistance with activity based on assessment  - Modify environment to reduce risk of injury  - Provide assistive devices as appropriate  - Consider OT/PT consult to assist with strengthening/mobility  - Encourage toileting  schedule  Outcome: Progressing     No acute changes. Sodium levels slowly improving. 1200 mL fluid restriction. Safety precautions in place. Call light is within reach.

## 2024-04-07 NOTE — CONSULTS
[facility]  part of [facility]    Report of Consultation    Scar Cline Patient Status:  Inpatient    1965 MRN [id number]   Location [facility] 5SW/SE Attending Barry Dumont MD   Hosp Day # 1 PCP Derian Benjamin MD     Date of Admission:  2024  Date of Consult:  2024   Reason for Consultation:   Patient presented with drowsiness, Barry Clinton MD requested psychiatric consult for evaluation and advice.    Consult Duration     The patient seen for initial psychiatric consult evaluation.   Record reviewed, communication with attending, communication with RN and patient seen face to face evaluation.    History of Present Illness:   Patient is a 58 year old   female with history of breast cancer HTN, Hodgkin's disease, congestive heart failure and history of anxiety who presented to the hospital with drowsiness and mild headache.  Patient found with hyponatremia and sodium 118.  Patient admitted to medical floor and psych consult requested for evaluation and advice.    The patient under my care in the outpatient office for anxiety.  Patient used to be on Depakote and risperidone but Depakote caused some swelling and lower extremity and he changed to Trileptal with discontinuation of risperidone.  Patient was doing reasonably well initially but after few weeks started to complain of concentration difficulty, increased stress at work and difficulty managing her anxiety.    The diversified that patient feeling a little was helping.  Otherwise she has been taking her propranolol 10 mg twice daily as needed for anxiety and taking hydroxyzine 10 mg 3 times daily as needed for anxiety additional to her nightly Valium 10 mg.    The patient today seen in her bed compliant with her .  She indicated that she has not been able to focus lately with tiredness and she has not been doing her morning exercise.  We discover the cause when  sodium tested.    Otherwise the patient reporting that her sleep has been reasonable.  Patient admitted some anxiety but denied any significant mood swings.  Patient denied any homicidal or suicidal ideation.  Patient denying any auditory or visual hallucination.    Patient reporting that she has not been drinking alcohol often and she has not been drinking excessive water otherwise osmolality is low and uric acid is low.    I agree with patient and  that we need to do some glancing from medication during this admission we agreed to discontinue Trileptal and minimize propranolol or paroxetine.     The patient is not demonstrating any renay or psychosis  The patient denies auditory or visual hallucinations  The patient denies suicidal or homicidal ideation.    The patient has been demonstrating feelings of increased anxiety, low mood, low energy, decreased motivation with increased worry, ruminations with impairment in sleep. The patient denies any suicidal ideations. The patient is agreeable to medications changes and to follow up with outpatient psychiatry providers.     Past Psychiatric/Medication History:  1. Prior diagnoses: Anxiety  2. Past psychiatric inpatient: 1 previous hospitalization  3. Past outpatient history: Follow-up by Dr. Haley  4. Past suicide history: None  5. Medication history: Trileptal    Social History:   Lives with her  were present independently of.  History of social drinking    Fa none gena History:  Unknown  Medical History:   Past Medical History  Past Medical History:   Diagnosis Date    Asplenia after surgical procedure 1984    Breast CA (HCC) 2019    right breast-bilat mastectomy    CMV hepatitis (HCC) 1997    Congestive heart disease (HCC)     dx 2019 dec-resolved now    COVID-19     positive 3/2020 - hospitalized-has had negative nasal swabs , last week last one    Gastroparesis     High blood pressure     Hodgkin's disease (HCC) 1984    3B-chemo    Insulin  dependent type 2 diabetes mellitus (HCC) 12/23/2019    Neuropathy 1984    R arm, hand, legs    Pancreatic insufficiency (HCC)     Personal history of antineoplastic chemotherapy 1984    Pneumococcal sepsis (HCC) 1994    sepsis R/T pneumococcal bacteria     Serratia 2005    Stress fracture of hip 2013    hip pinning    Type 1 diabetes mellitus (HCC)     Type 1-C    Visual impairment     WEARS GLASSES/CONTACT       Past Surgical History  Past Surgical History:   Procedure Laterality Date    APPENDECTOMY  1992    CHEMOTHERAPY  1984    Hodgkin;s disease-3B    COLONOSCOPY  03/21/2014    COLONOSCOPY N/A 10/19/2019    Procedure: COLONOSCOPY;  Surgeon: Eric De Jesus MD;  Location: [facility] ENDOSCOPY    COLONOSCOPY N/A 07/18/2020    Procedure: COLONOSCOPY;  Surgeon: Eric De Jesus MD;  Location: [facility] ENDOSCOPY    FRACTURE SURGERY Right 09/05/2013    Right femoral neck stress fracture percutaneous pinning    HIP SURGERY Left 2010    left, no pins    IMPLANT LEFT  12/28/2020    IMPLANT RIGHT Bilateral 12/28/2020    Removal of bilateral silicone breast implants; Placement of permanent implants,  Autologous fat grafting from abdomen, flanks, and inner thighs    FELISHA LOCALIZATION WIRE 1 SITE RIGHT (CPT=19281)      1994    MASTECTOMY LEFT      MASTECTOMY RIGHT  12/23/2019    Bilateral skin sparing mastectomy with right breast injection    REMOVE TISSUE EXPANDER(S)  08/06/2020    Removal of bilateral tissue expanders,  Placement of permanent silicone gel implant...    REMOVE TISSUE EXPANDER(S) Left 06/18/2020    Removal of left breast tissue expander. Evacuation and washout of left breast hematoma....    REMOVE TISSUE EXPANDER(S) Left 02/29/2020    Left breast tissue expander removal. Irrigation and washout, left breast. Complete capsulectomy, left breast    SPLENECTOMY  1984       Family History  Family History   Problem Relation Age of Onset    Glaucoma Mother     Hypertension Mother     Heart Disease Father         CAD     Hypertension Father     Other (Other) Father         polymyalgia rheumatic    Breast Cancer Maternal Grandmother 80        post menopausal    Genetic Disease Other 5        Neurofibromatosis    Other (non Hodgkin's lymphoma follicular) Sister 53    Other (epilepsy) Son     No Known Problems Daughter     Cancer Self         hodgkins 1994    Diabetes Neg        Social History  Social History     Socioeconomic History    Marital status:    Occupational History    Occupation:    Tobacco Use    Smoking status: Never    Smokeless tobacco: Never   Vaping Use    Vaping Use: Never used   Substance and Sexual Activity    Alcohol use: No    Drug use: No   Other Topics Concern    Caffeine Concern Yes     Comment: coffee-1 cup/day    Pt has a pacemaker No    Pt has a defibrillator No    Reaction to local anesthetic No     Social Determinants of Health     Food Insecurity: No Food Insecurity (4/6/2024)    Food Insecurity     Food Insecurity: Never true   Transportation Needs: No Transportation Needs (4/6/2024)    Transportation Needs     Lack of Transportation: No   Housing Stability: Low Risk  (4/6/2024)    Housing Stability     Housing Instability: No           Current Medications:  Current Facility-Administered Medications   Medication Dose Route Frequency    sodium chloride tab 1 g  1 g Oral Daily    dilTIAZem ER (CardIZEM CD) 24 hr cap 360 mg  360 mg Oral Daily    lisinopril (Zestril) tab 10 mg  10 mg Oral BID    pantoprazole (Protonix) DR tab 40 mg  40 mg Oral QAM AC    glucose (Dex4) 15 GM/59ML oral liquid 15 g  15 g Oral Q15 Min PRN    Or    glucose (Glutose) 40% oral gel 15 g  15 g Oral Q15 Min PRN    Or    glucose-vitamin C (Dex-4) chewable tab 4 tablet  4 tablet Oral Q15 Min PRN    Or    dextrose 50% injection 50 mL  50 mL Intravenous Q15 Min PRN    Or    glucose (Dex4) 15 GM/59ML oral liquid 30 g  30 g Oral Q15 Min PRN    Or    glucose (Glutose) 40% oral gel 30 g  30 g Oral Q15 Min PRN    Or     glucose-vitamin C (Dex-4) chewable tab 8 tablet  8 tablet Oral Q15 Min PRN    enoxaparin (Lovenox) 40 MG/0.4ML SUBQ injection 40 mg  40 mg Subcutaneous Daily    acetaminophen (Tylenol Extra Strength) tab 500 mg  500 mg Oral Q4H PRN    acetaminophen (Tylenol) tab 650 mg  650 mg Oral Q4H PRN    Or    HYDROcodone-acetaminophen (Norco) 5-325 MG per tab 1 tablet  1 tablet Oral Q4H PRN    Or    HYDROcodone-acetaminophen (Norco) 5-325 MG per tab 2 tablet  2 tablet Oral Q4H PRN    temazepam (Restoril) cap 15 mg  15 mg Oral Nightly PRN    polyethylene glycol (PEG 3350) (Miralax) 17 g oral packet 17 g  17 g Oral Daily PRN    sennosides (Senokot) tab 17.2 mg  17.2 mg Oral Nightly PRN    bisacodyl (Dulcolax) 10 MG rectal suppository 10 mg  10 mg Rectal Daily PRN    fleet enema (Fleet) 7-19 GM/118ML rectal enema 133 mL  1 enema Rectal Once PRN    ondansetron (Zofran) 4 MG/2ML injection 4 mg  4 mg Intravenous Q6H PRN    insulin aspart (NovoLOG) 100 Units/mL FlexPen 1-5 Units  1-5 Units Subcutaneous TID CC and HS     Medications Prior to Admission   Medication Sig    lisinopril 10 MG Oral Tab Take 1 tablet (10 mg total) by mouth in the morning and 1 tablet (10 mg total) before bedtime.    acetaminophen 500 MG Oral Tab Take 2 tablets (1,000 mg total) by mouth 2 (two) times daily as needed for Pain.    ibuprofen 800 MG Oral Tab Take 1 tablet (800 mg total) by mouth 2 (two) times daily as needed for Pain.    dilTIAZem HCl ER Coated Beads (CARTIA XT) 180 MG Oral Capsule SR 24 Hr Take 1 capsule (180 mg total) by mouth daily. (Patient taking differently: Take 2 capsules (360 mg total) by mouth daily.)    insulin glargine 100 UNIT/ML Subcutaneous Solution Inject into the skin as needed.    omeprazole 20 MG Oral Capsule Delayed Release Take 1 capsule (20 mg total) by mouth 2 (two) times daily before meals.    furosemide 20 MG Oral Tab Take 1 tablet (20 mg total) by mouth daily. (Patient not taking: Reported on 4/6/2024)    divalproex ER  (DEPAKOTE ER) 500 MG Oral Tablet 24 Hr Take 1 tablet (500 mg total) by mouth nightly as needed. (Patient not taking: Reported on 4/6/2024)       Allergies  Allergies   Allergen Reactions    Iodinated Diagnostic Agents [Radiology Contrast Iodinated Dyes] HIVES     Other reaction(s): IODINATED CONTRAST MEDIA - IV DYE    Penicillins OTHER (SEE COMMENTS)     MOUTH SORES    Scopolamine OTHER (SEE COMMENTS)     Very confused       Review of Systems:   As by Admitting/Attending    Results:   Laboratory Data:  Lab Results   Component Value Date    WBC 8.1 04/06/2024    HGB 12.3 04/06/2024    HCT 34.6 (L) 04/06/2024    .0 (H) 04/06/2024    CREATSERUM 0.76 04/06/2024    BUN 9 04/06/2024     (L) 04/06/2024    K 3.6 04/06/2024    CL 87 (L) 04/06/2024    CO2 25.0 04/06/2024     (H) 04/06/2024    CA 9.8 04/06/2024    ALB 5.1 (H) 01/08/2024    ALKPHO 96 01/08/2024    TP 8.4 (H) 01/08/2024    AST 27 01/08/2024    ALT 16 01/08/2024    INR 1.0 10/24/2014    T4F 1.0 12/30/2023    TSH 1.716 12/30/2023    LIP 79 01/25/2022    ESRML 19 10/31/2019    CRP 0.69 (H) 04/07/2020    MG 2.4 12/31/2023    PHOS 3.8 01/02/2024    TROP <0.045 04/08/2019     04/06/2020         Imaging:  No results found.    Vital Signs:   Blood pressure 114/65, pulse 72, temperature 98 °F (36.7 °C), temperature source Oral, resp. rate 17, height 67\", weight 73.9 kg (162 lb 14.4 oz), SpO2 98%.    Mental Status Exam:   Appearance: Stated age female, in hospital gown, laying down in hospital bed.  Psychomotor: No psychomotor agitation, or retardation.   Orientation: Alert and oriented to person, place, time and condition.  Gait: Not evaluated.  Attitude/Coorperation: Cooperative and attentive.  Behavior: Appropriate.  Speech: Regular rate and rhythm speech.  Mood: Patient reporting depressed mood.  Affect: Anxious affect, congruent with the mood.  Thought process: Linear and appropriate.  Thought content: Patient denies any suicidal or  homicidal ideation.  Perceptions: Patient denies any auditory or visual hallucinations.  Concentration: Grossly intact.  Memory: Grossly intact.  Intellect: Average.  Judgment and Insight: Questionable.     Impression:     Generalized anxiety disorder.  Hyponatremia  Hyperglycemia      The patient is a 58-year-old   female with multiple medical condition including Ultra-Screen lymphoma, congestive heart failure and going through a lot who presented to the hospital after she was reporting some dizziness to her .  Blood work indicated sodium level is 118.    Patient today denying any excessive drinking otherwise osmolality has remained low.  Renal    patient is a  lady with an anxiety and well-controlled condition who has been stressed recently with her job and has not filed herself also demonstrating physical symptoms.  Patient have reaction to it with food goodbye and terminology patient.    And she does not feel Trileptal helping    The patient agreeing that she need to make effluxing from medication today  Tried cautiously but very used the hospitalization as a resource and place for temporary..      Discussed risk and benefit, acknowledging the current symptom and severity.  At this point, I would recommend the following approach:     Focus on safety  Focus on education and support.  Focus on insight orientation helping the patient understand diagnosis and treatment plan.  Discontinue Trileptal.  Other medication as needed  Processed with patient at length, the initiation of the above psychotropic medications I advised the patient of the risks, benefits, alternatives and potential side effects. The patient consents to administration of the medications and understands the right to refuse medications at any time. The patient verbalized understanding.   Coordinate plan with team    Orders This Visit:  Orders Placed This Encounter   Procedures    CBC With Differential With Platelet     Basic Metabolic Panel (8)    Urinalysis, Routine    Creatinine, Urine, Random    Osmolality, Urine    Osmolality, Serum    Sodium, Urine, Random    Uric Acid    Sodium, Serum    Cortisol    TSH W Reflex To Free T4    Drug Abuse Panel 10 screen    Basic Metabolic Panel (8)    Magnesium    CBC With Differential With Platelet       Meds This Visit:  Requested Prescriptions      No prescriptions requested or ordered in this encounter       Taiwo Haley MD  4/6/2024    Note to Patient: The 21st Century Cures Act makes medical notes like these available to patients in the interest of transparency. However, be advised this is a medical document. It is intended as peer to peer communication. It is written in medical language and may contain abbreviations or verbiage that are unfamiliar. It may appear blunt or direct. Medical documents are intended to carry relevant information, facts as evident, and the clinical opinion of the practitioner. This note may have been transcribed using a voice dictation system. Voice recognition errors may occur. This should not be taken to alter the content or meaning of this note.

## 2024-04-07 NOTE — PLAN OF CARE
Problem: Patient Centered Care  Goal: Patient preferences are identified and integrated in the patient's plan of care  Description: Interventions:  - What would you like us to know as we care for you? From home with   - Provide timely, complete, and accurate information to patient/family  - Incorporate patient and family knowledge, values, beliefs, and cultural backgrounds into the planning and delivery of care  - Encourage patient/family to participate in care and decision-making at the level they choose  - Honor patient and family perspectives and choices  Outcome: Progressing     Problem: PAIN - ADULT  Goal: Verbalizes/displays adequate comfort level or patient's stated pain goal  Description: INTERVENTIONS:  - Encourage pt to monitor pain and request assistance  - Assess pain using appropriate pain scale  - Administer analgesics based on type and severity of pain and evaluate response  - Implement non-pharmacological measures as appropriate and evaluate response  - Consider cultural and social influences on pain and pain management  - Manage/alleviate anxiety  - Utilize distraction and/or relaxation techniques  - Monitor for opioid side effects  - Notify MD/LIP if interventions unsuccessful or patient reports new pain  - Anticipate increased pain with activity and pre-medicate as appropriate  Outcome: Progressing     Problem: SAFETY ADULT - FALL  Goal: Free from fall injury  Description: INTERVENTIONS:  - Assess pt frequently for physical needs  - Identify cognitive and physical deficits and behaviors that affect risk of falls.  - [place] fall precautions as indicated by assessment.  - Educate pt/family on patient safety including physical limitations  - Instruct pt to call for assistance with activity based on assessment  - Modify environment to reduce risk of injury  - Provide assistive devices as appropriate  - Consider OT/PT consult to assist with strengthening/mobility  - Encourage toileting  schedule  Outcome: Progressing     Problem: DISCHARGE PLANNING  Goal: Discharge to home or other facility with appropriate resources  Description: INTERVENTIONS:  - Identify barriers to discharge w/pt and caregiver  - Include patient/family/discharge partner in discharge planning  - Arrange for needed discharge resources and transportation as appropriate  - Identify discharge learning needs (meds, wound care, etc)  - Arrange for interpreters to assist at discharge as needed  - Consider post-discharge preferences of patient/family/discharge partner  - Complete POLST form as appropriate  - Assess patient's ability to be responsible for managing their own health  - Refer to Case Management Department for coordinating discharge planning if the patient needs post-hospital services based on physician/LIP order or complex needs related to functional status, cognitive ability or social support system  Outcome: Progressing     Problem: GENITOURINARY - ADULT  Goal: Absence of urinary retention  Description: INTERVENTIONS:  - Assess patient’s ability to void and empty bladder  - Monitor intake/output and perform bladder scan as needed  - Follow urinary retention protocol/standard of care  - Consider collaborating with pharmacy to review patient's medication profile  - Implement strategies to promote bladder emptying  Outcome: Progressing     Problem: METABOLIC/FLUID AND ELECTROLYTES - ADULT  Goal: Electrolytes maintained within normal limits  Description: INTERVENTIONS:  - Monitor labs and rhythm and assess patient for signs and symptoms of electrolyte imbalances  - Administer electrolyte replacement as ordered  - Monitor response to electrolyte replacements, including rhythm and repeat lab results as appropriate  - Fluid restriction as ordered  - Instruct patient on fluid and nutrition restrictions as appropriate  Outcome: Progressing  Goal: Hemodynamic stability and optimal renal function maintained  Description:  INTERVENTIONS:  - Monitor labs and assess for signs and symptoms of volume excess or deficit  - Monitor intake, output and patient weight  - Monitor urine specific gravity, serum osmolarity and serum sodium as indicated or ordered  - Monitor response to interventions for patient's volume status, including labs, urine output, blood pressure (other measures as available)  - Encourage oral intake as appropriate  - Instruct patient on fluid and nutrition restrictions as appropriate  Outcome: Progressing     Problem: Patient/Family Goals  Goal: Patient/Family Long Term Goal  Description: Patient's Long Term Goal: Discharge from the hospital    Interventions:  - Monitor vital signs  - Monitor appropriate labs  - Monitor blood glucose levels  - Pain management  - Administer medications per order  - Follow MD orders  - Diagnostics per order  - Update / inform patient and family on plan of care  - Discharge planning  - See additional Care Plan goals for specific interventions  Outcome: Progressing  Goal: Patient/Family Short Term Goal  Description: Patient's Short Term Goal: Improve sodium levels    Interventions:   -  Monitor vital signs  - Monitor appropriate labs  - Monitor blood glucose levels  - Pain management  - Administer medications per order  - Follow MD orders  - Diagnostics per order  - Update / inform patient and family on plan of care  - See additional Care Plan goals for specific interventions  Outcome: Progressing      Monitoring vital signs- stable at this time. No acute changes noted at this moment. Safety and fall precautions maintained- bed alarm on, bed locked in lowest position, call light within reach. Frequent rounding by nursing staff.

## 2024-04-07 NOTE — PROGRESS NOTES
Progress Note     Scar Cline Patient Status:  Inpatient    1965 MRN [id number]   Location [facility] 5SW/SE Attending Héctor Tenorio MD   Hosp Day # 1 PCP Derian Benjamin MD     Chief Complaint: hyponatremia    Subjective:   S: Patient to be slower gait was altered at home  Patient blood work revealed a sodium of 118  Medication was Trileptal which was newly started    Review of Systems:   10 point ROS completed and was negative, except for pertinent positive and negatives stated in subjective.    Objective:   Vital signs:  Temp:  [97.9 °F (36.6 °C)-98.9 °F (37.2 °C)] 98.2 °F (36.8 °C)  Pulse:  [59-78] 67  Resp:  [14-21] 18  BP: (114-160)/(65-92) 137/81  SpO2:  [95 %-100 %] 99 %    Wt Readings from Last 6 Encounters:   24 161 lb 1.6 oz (73.1 kg)   23 163 lb 8 oz (74.2 kg)   22 144 lb (65.3 kg)   22 148 lb (67.1 kg)   10/04/21 155 lb (70.3 kg)   21 134 lb (60.8 kg)         Physical Exam:    General: No acute distress. Alert ,         Respiratory: Clear to auscultation bilaterally. No wheezes. No rhonchi.  Cardiovascular: S1, S2. Regular rate and rhythm. No murmurs, rubs or gallops.   Abdomen: Soft, nontender, nondistended.  Positive bowel sounds. No rebound or guarding.  Neurologic: No focal neurological deficits.   Musculoskeletal: Moves all extremities.  Extremities: No edema.    Results:   Diagnostic Data:      Labs:    Labs Last 24 Hours:   BMP     CBC    Other     Na 121 Cl 91 BUN 11 Glu 104   Hb 12.3   PTT - Procal -   K 3.1 CO2 25.0 Cr 0.85   WBC 6.8 >< .0  INR - CRP -   Renal Lytes Endo    Hct 34.3   Trop - D dim -   eGFR - Ca 10.1 POC Gluc  180    LFT   pBNP - Lactic -   eGFR AA - PO4 - A1c -   AST - APk - Prot -  LDL -     Mg 1.8 TSH 1.277   ALT - T harman - Alb -        COVID-19 Lab Results    COVID-19  Lab Results   Component Value Date    COVID19 Not Detected 2022    COVID19 Not Detected 2021    COVID19 Not Detected 2020        Pro-Calcitonin  No results for input(s): \"PCT\" in the last 168 hours.    Cardiac  No results for input(s): \"TROP\", \"PBNP\" in the last 168 hours.    Creatinine Kinase  No results for input(s): \"CK\" in the last 168 hours.    Inflammatory Markers  No results for input(s): \"CRP\", \"PATRICK\", \"LDH\", \"DDIMER\" in the last 168 hours.    Imaging: Imaging data reviewed in Epic.    Medications:    dilTIAZem HCl ER Coated Beads  180 mg Oral BID    sodium chloride  1 g Oral Daily    lisinopril  10 mg Oral BID    pantoprazole  40 mg Oral QAM AC    enoxaparin  40 mg Subcutaneous Daily    insulin aspart  1-5 Units Subcutaneous TID CC and HS       Assessment & Plan:   ASSESSMENT / PLAN:     Hyponatremia  -Patient presenting with mild confusion, lower extremity edema  -Noted to have sodium levels of 118.  -Previous episode of hyponatremia attributed to hydrochlorothiazide and Depakote  -Nephrology consulted, appreciate recommendations, sodium improved to 124 but back down to 121  -will d/w Dr. Pérez  -starting ivf     Confusion  -improved     Type 1 diabetes mellitus  -Secondary to pancreatic insufficiency  -Has home insulin pump, while hospitalized sliding scale insulin given recent lower blood sugars  -d/w dheanna, given hba1c 5.8,  -Endocrinology on consult, appreciate recommendations  -ssi      HTN  -Uncontrolled in the ER likely situational now improved appreciate cardiology evaluation  - controlled  -cont lisinopril  - Monitor and adjust as needed     Other problems  History of breast CA  History of Hodgkin's lymphoma  Pancreatic insufficiency  Gastroparesis         Quality:  DVT Prophylaxis: lovenox   CODE status: full  Martin:    Central line: n/a  Dispo: further recs pending clinical course      Will the patient be referred to [facility] on discharge?: yes  Estimated date of discharge: Tomorrow   Discharge is dependent on: pending labs  At this point Ms. Joseph Cline is expected to be discharge to: home    Plan of care discussed  with patient,nursing, and family    Outpatient records or previous hospital records reviewed.   Patient and/or patient's family given opportunity to ask questions and note understanding and agreeing with therapeutic plan as outlined     Coordinated care with providers and counseling re: treatment plan and workup    MDM: High complexity     NAV MADRID MD    Supplementary Documentation:

## 2024-04-07 NOTE — PHYSICAL THERAPY NOTE
Chart reviewed , RN approve therapy participation.  Pt in bed , spouse in room.   Therapy introduced POC / MD order for PT assessment..  Pt declining any need for PT at this time.   Pt reports has been ambulatory on unit without difficulty and denies any concern for DC to home setting with spouse.     Primary care MD informed via bubble chat  pt declining therapy participation/ need for PT services  ---PT will DC order at this time .  If a new skilled therapy need arises this admission please re-order .

## 2024-04-07 NOTE — PROGRESS NOTES
04/07/24 1044   VISIT TYPE   OT Inpatient Visit Type (Documentation Required) Attempted Evaluation     Orders received and chart reviewed.  Pt and spouse refusing therapy evaluations.  PT informed team of patient and spouse's current refusal state.

## 2024-04-08 VITALS
DIASTOLIC BLOOD PRESSURE: 77 MMHG | SYSTOLIC BLOOD PRESSURE: 119 MMHG | RESPIRATION RATE: 18 BRPM | BODY MASS INDEX: 26.06 KG/M2 | TEMPERATURE: 99 F | OXYGEN SATURATION: 98 % | HEIGHT: 67 IN | WEIGHT: 166 LBS | HEART RATE: 57 BPM

## 2024-04-08 LAB
ANION GAP SERPL CALC-SCNC: 4 MMOL/L (ref 0–18)
BASOPHILS # BLD AUTO: 0.12 X10(3) UL (ref 0–0.2)
BASOPHILS NFR BLD AUTO: 1.7 %
BUN BLD-MCNC: 8 MG/DL (ref 9–23)
BUN/CREAT SERPL: 11.3 (ref 10–20)
CALCIUM BLD-MCNC: 8.9 MG/DL (ref 8.7–10.4)
CHLORIDE SERPL-SCNC: 102 MMOL/L (ref 98–112)
CO2 SERPL-SCNC: 24 MMOL/L (ref 21–32)
CREAT BLD-MCNC: 0.71 MG/DL
DEPRECATED RDW RBC AUTO: 43.9 FL (ref 35.1–46.3)
EGFRCR SERPLBLD CKD-EPI 2021: 98 ML/MIN/1.73M2 (ref 60–?)
EOSINOPHIL # BLD AUTO: 0.09 X10(3) UL (ref 0–0.7)
EOSINOPHIL NFR BLD AUTO: 1.3 %
ERYTHROCYTE [DISTWIDTH] IN BLOOD BY AUTOMATED COUNT: 14.4 % (ref 11–15)
GLUCOSE BLD-MCNC: 93 MG/DL (ref 70–99)
GLUCOSE BLDC GLUCOMTR-MCNC: 107 MG/DL (ref 70–99)
HCT VFR BLD AUTO: 31 %
HGB BLD-MCNC: 10.9 G/DL
IMM GRANULOCYTES # BLD AUTO: 0 X10(3) UL (ref 0–1)
IMM GRANULOCYTES NFR BLD: 0 %
IRON SATN MFR SERPL: 9 %
IRON SERPL-MCNC: 38 UG/DL
LYMPHOCYTES # BLD AUTO: 4.02 X10(3) UL (ref 1–4)
LYMPHOCYTES NFR BLD AUTO: 56.7 %
MAGNESIUM SERPL-MCNC: 1.8 MG/DL (ref 1.6–2.6)
MCH RBC QN AUTO: 29.3 PG (ref 26–34)
MCHC RBC AUTO-ENTMCNC: 35.2 G/DL (ref 31–37)
MCV RBC AUTO: 83.3 FL
MONOCYTES # BLD AUTO: 1.07 X10(3) UL (ref 0.1–1)
MONOCYTES NFR BLD AUTO: 15.1 %
NEUTROPHILS # BLD AUTO: 1.79 X10 (3) UL (ref 1.5–7.7)
NEUTROPHILS # BLD AUTO: 1.79 X10(3) UL (ref 1.5–7.7)
NEUTROPHILS NFR BLD AUTO: 25.2 %
OSMOLALITY SERPL CALC.SUM OF ELEC: 268 MOSM/KG (ref 275–295)
PLATELET # BLD AUTO: 574 10(3)UL (ref 150–450)
POTASSIUM SERPL-SCNC: 4 MMOL/L (ref 3.5–5.1)
POTASSIUM SERPL-SCNC: 4 MMOL/L (ref 3.5–5.1)
RBC # BLD AUTO: 3.72 X10(6)UL
SODIUM SERPL-SCNC: 130 MMOL/L (ref 136–145)
SODIUM SERPL-SCNC: 130 MMOL/L (ref 136–145)
SODIUM SERPL-SCNC: 131 MMOL/L (ref 136–145)
TIBC SERPL-MCNC: 405 UG/DL (ref 250–425)
TRANSFERRIN SERPL-MCNC: 272 MG/DL (ref 250–380)
WBC # BLD AUTO: 7.1 X10(3) UL (ref 4–11)

## 2024-04-08 PROCEDURE — 99233 SBSQ HOSP IP/OBS HIGH 50: CPT | Performed by: HOSPITALIST

## 2024-04-08 PROCEDURE — 99233 SBSQ HOSP IP/OBS HIGH 50: CPT | Performed by: INTERNAL MEDICINE

## 2024-04-08 PROCEDURE — 99233 SBSQ HOSP IP/OBS HIGH 50: CPT | Performed by: OTHER

## 2024-04-08 RX ORDER — DIAZEPAM 5 MG/1
5 TABLET ORAL NIGHTLY
Status: SHIPPED | COMMUNITY
Start: 2024-04-08

## 2024-04-08 RX ORDER — MAGNESIUM OXIDE 400 MG/1
400 TABLET ORAL ONCE
Status: COMPLETED | OUTPATIENT
Start: 2024-04-08 | End: 2024-04-08

## 2024-04-08 RX ORDER — TRAZODONE HYDROCHLORIDE 50 MG/1
25 TABLET ORAL NIGHTLY
Qty: 30 TABLET | Refills: 0 | Status: SHIPPED | OUTPATIENT
Start: 2024-04-08

## 2024-04-08 NOTE — PLAN OF CARE
Problem: Patient Centered Care  Goal: Patient preferences are identified and integrated in the patient's plan of care  Description: Interventions:  - What would you like us to know as we care for you? From home with   - Provide timely, complete, and accurate information to patient/family  - Incorporate patient and family knowledge, values, beliefs, and cultural backgrounds into the planning and delivery of care  - Encourage patient/family to participate in care and decision-making at the level they choose  - Honor patient and family perspectives and choices  Outcome: Progressing     Problem: PAIN - ADULT  Goal: Verbalizes/displays adequate comfort level or patient's stated pain goal  Description: INTERVENTIONS:  - Encourage pt to monitor pain and request assistance  - Assess pain using appropriate pain scale  - Administer analgesics based on type and severity of pain and evaluate response  - Implement non-pharmacological measures as appropriate and evaluate response  - Consider cultural and social influences on pain and pain management  - Manage/alleviate anxiety  - Utilize distraction and/or relaxation techniques  - Monitor for opioid side effects  - Notify MD/LIP if interventions unsuccessful or patient reports new pain  - Anticipate increased pain with activity and pre-medicate as appropriate  Outcome: Progressing     Problem: SAFETY ADULT - FALL  Goal: Free from fall injury  Description: INTERVENTIONS:  - Assess pt frequently for physical needs  - Identify cognitive and physical deficits and behaviors that affect risk of falls.  - [place] fall precautions as indicated by assessment.  - Educate pt/family on patient safety including physical limitations  - Instruct pt to call for assistance with activity based on assessment  - Modify environment to reduce risk of injury  - Provide assistive devices as appropriate  - Consider OT/PT consult to assist with strengthening/mobility  - Encourage toileting  schedule  Outcome: Progressing     Problem: DISCHARGE PLANNING  Goal: Discharge to home or other facility with appropriate resources  Description: INTERVENTIONS:  - Identify barriers to discharge w/pt and caregiver  - Include patient/family/discharge partner in discharge planning  - Arrange for needed discharge resources and transportation as appropriate  - Identify discharge learning needs (meds, wound care, etc)  - Arrange for interpreters to assist at discharge as needed  - Consider post-discharge preferences of patient/family/discharge partner  - Complete POLST form as appropriate  - Assess patient's ability to be responsible for managing their own health  - Refer to Case Management Department for coordinating discharge planning if the patient needs post-hospital services based on physician/LIP order or complex needs related to functional status, cognitive ability or social support system  Outcome: Progressing     Problem: GENITOURINARY - ADULT  Goal: Absence of urinary retention  Description: INTERVENTIONS:  - Assess patient’s ability to void and empty bladder  - Monitor intake/output and perform bladder scan as needed  - Follow urinary retention protocol/standard of care  - Consider collaborating with pharmacy to review patient's medication profile  - Implement strategies to promote bladder emptying  Outcome: Progressing     Problem: METABOLIC/FLUID AND ELECTROLYTES - ADULT  Goal: Electrolytes maintained within normal limits  Description: INTERVENTIONS:  - Monitor labs and rhythm and assess patient for signs and symptoms of electrolyte imbalances  - Administer electrolyte replacement as ordered  - Monitor response to electrolyte replacements, including rhythm and repeat lab results as appropriate  - Fluid restriction as ordered  - Instruct patient on fluid and nutrition restrictions as appropriate  Outcome: Progressing  Goal: Hemodynamic stability and optimal renal function maintained  Description:  INTERVENTIONS:  - Monitor labs and assess for signs and symptoms of volume excess or deficit  - Monitor intake, output and patient weight  - Monitor urine specific gravity, serum osmolarity and serum sodium as indicated or ordered  - Monitor response to interventions for patient's volume status, including labs, urine output, blood pressure (other measures as available)  - Encourage oral intake as appropriate  - Instruct patient on fluid and nutrition restrictions as appropriate  Outcome: Progressing     Problem: SKIN/TISSUE INTEGRITY - ADULT  Goal: Skin integrity remains intact  Description: INTERVENTIONS  - Assess and document risk factors for pressure ulcer development  - Assess and document skin integrity  - Monitor for areas of redness and/or skin breakdown  - Initiate interventions, skin care algorithm/standards of care as needed  Outcome: Progressing     Problem: Patient/Family Goals  Goal: Patient/Family Long Term Goal  Description: Patient's Long Term Goal: Discharge from the hospital    Interventions:  - Monitor vital signs  - Monitor appropriate labs  - Monitor blood glucose levels  - Pain management  - Administer medications per order  - Follow MD orders  - Diagnostics per order  - Update / inform patient and family on plan of care  - Discharge planning  - See additional Care Plan goals for specific interventions  Outcome: Progressing  Goal: Patient/Family Short Term Goal  Description: Patient's Short Term Goal: Improve sodium levels    Interventions:   -  Monitor vital signs  - Monitor appropriate labs  - Monitor blood glucose levels  - Pain management  - Administer medications per order  - Follow MD orders  - Diagnostics per order  - Update / inform patient and family on plan of care  - See additional Care Plan goals for specific interventions  Outcome: Progressing     Monitoring vital signs, stable at this time.  Monitoring blood glucose levels. Medication provided as needed. Encouraging ambulation. No  acute changes at this moment. Safety and fall precautions in place, bed locked and in lowest position, call light in reach. Frequent rounding by nursing staff.

## 2024-04-08 NOTE — PROGRESS NOTES
Patient is a 58 year old   female with history of breast cancer HTN, Hodgkin's disease, congestive heart failure and history of anxiety who presented to the hospital with drowsiness and mild headache.  Patient found with hyponatremia and sodium 118.  Patient admitted to medical floor and psych consult requested for evaluation and advice.    Consult Duration     The patient seen for over 35 minutes, follow-up evaluation, over 50% counseling and coordinating care addressing anxiety and insomnia.    Record reviewed, communication with attending, communication with RN and patient seen face to face evaluation.    History of Present Illness:     According to the team the patient has been doing well but did not sleep well last night.    The patient in her room, cooperative pleasant reporting \"I was hoping to go home today\".  Patient with a sodium level of 121 today and limited improvement.    The patient talked today about her anxiety and her insomnia reporting that she took her own Valium last night 7.5 mg nightly and she did not sleep well.  Additional to the environment the patient was anxious.  Discussed at length the need to improve sleep quality and lower Valium.  Patient admitted psychological attachment and anxiety about changing the dosage.    The patient otherwise after long discussion agreed on lowering Valium to 5 mg nightly and having trazodone 25 mg.      The patient is not demonstrating any renay or psychosis  The patient denies auditory or visual hallucinations  The patient denies suicidal or homicidal ideation.    The patient has been demonstrating feelings of increased anxiety, low mood, low energy, decreased motivation with increased worry, ruminations with impairment in sleep. The patient denies any suicidal ideations. The patient is agreeable to medications changes and to follow up with outpatient psychiatry providers.     Past Psychiatric/Medication History:  1. Prior diagnoses:  Anxiety  2. Past psychiatric inpatient: 1 previous hospitalization  3. Past outpatient history: Follow-up by Dr. Haley  4. Past suicide history: None  5. Medication history: Trileptal    Social History:   Lives with her  were present independently of.  History of social drinking    Fa none gena History:  Unknown  Medical History:   Past Medical History  Past Medical History:   Diagnosis Date    Asplenia after surgical procedure 1984    Breast CA (HCC) 2019    right breast-bilat mastectomy    CMV hepatitis (HCC) 1997    Congestive heart disease (HCC)     dx 2019 dec-resolved now    COVID-19     positive 3/2020 - hospitalized-has had negative nasal swabs , last week last one    Gastroparesis     High blood pressure     Hodgkin's disease (HCC) 1984    3B-chemo    Insulin dependent type 2 diabetes mellitus (HCC) 12/23/2019    Neuropathy 1984    R arm, hand, legs    Pancreatic insufficiency (HCC)     Personal history of antineoplastic chemotherapy 1984    Pneumococcal sepsis (HCC) 1994    sepsis R/T pneumococcal bacteria     Serratia 2005    Stress fracture of hip 2013    hip pinning    Type 1 diabetes mellitus (HCC)     Type 1-C    Visual impairment     WEARS GLASSES/CONTACT       Past Surgical History  Past Surgical History:   Procedure Laterality Date    APPENDECTOMY  1992    CHEMOTHERAPY  1984    Hodgkin;s disease-3B    COLONOSCOPY  03/21/2014    COLONOSCOPY N/A 10/19/2019    Procedure: COLONOSCOPY;  Surgeon: Eric De Jesus MD;  Location: [facility] ENDOSCOPY    COLONOSCOPY N/A 07/18/2020    Procedure: COLONOSCOPY;  Surgeon: Eric De Jesus MD;  Location: [facility] ENDOSCOPY    FRACTURE SURGERY Right 09/05/2013    Right femoral neck stress fracture percutaneous pinning    HIP SURGERY Left 2010    left, no pins    IMPLANT LEFT  12/28/2020    IMPLANT RIGHT Bilateral 12/28/2020    Removal of bilateral silicone breast implants; Placement of permanent implants,  Autologous fat grafting from abdomen, flanks, and  inner thighs    FELISHA LOCALIZATION WIRE 1 SITE RIGHT (CPT=19281)      1994    MASTECTOMY LEFT      MASTECTOMY RIGHT  12/23/2019    Bilateral skin sparing mastectomy with right breast injection    REMOVE TISSUE EXPANDER(S)  08/06/2020    Removal of bilateral tissue expanders,  Placement of permanent silicone gel implant...    REMOVE TISSUE EXPANDER(S) Left 06/18/2020    Removal of left breast tissue expander. Evacuation and washout of left breast hematoma....    REMOVE TISSUE EXPANDER(S) Left 02/29/2020    Left breast tissue expander removal. Irrigation and washout, left breast. Complete capsulectomy, left breast    SPLENECTOMY  1984       Family History  Family History   Problem Relation Age of Onset    Glaucoma Mother     Hypertension Mother     Heart Disease Father         CAD    Hypertension Father     Other (Other) Father         polymyalgia rheumatic    Breast Cancer Maternal Grandmother 80        post menopausal    Genetic Disease Other 5        Neurofibromatosis    Other (non Hodgkin's lymphoma follicular) Sister 53    Other (epilepsy) Son     No Known Problems Daughter     Cancer Self         hodgkins 1994    Diabetes Neg        Social History  Social History     Socioeconomic History    Marital status:    Occupational History    Occupation:    Tobacco Use    Smoking status: Never    Smokeless tobacco: Never   Vaping Use    Vaping Use: Never used   Substance and Sexual Activity    Alcohol use: No    Drug use: No   Other Topics Concern    Caffeine Concern Yes     Comment: coffee-1 cup/day    Pt has a pacemaker No    Pt has a defibrillator No    Reaction to local anesthetic No     Social Determinants of Health     Food Insecurity: No Food Insecurity (4/6/2024)    Food Insecurity     Food Insecurity: Never true   Transportation Needs: No Transportation Needs (4/6/2024)    Transportation Needs     Lack of Transportation: No   Housing Stability: Low Risk  (4/6/2024)    Housing Stability     Housing  Instability: No           Current Medications:      Medications Prior to Admission   Medication Sig    lisinopril 10 MG Oral Tab Take 1 tablet (10 mg total) by mouth in the morning and 1 tablet (10 mg total) before bedtime.    acetaminophen 500 MG Oral Tab Take 2 tablets (1,000 mg total) by mouth 2 (two) times daily as needed for Pain.    ibuprofen 800 MG Oral Tab Take 1 tablet (800 mg total) by mouth 2 (two) times daily as needed for Pain.    dilTIAZem HCl ER Coated Beads (CARTIA XT) 180 MG Oral Capsule SR 24 Hr Take 1 capsule (180 mg total) by mouth daily. (Patient taking differently: Take 2 capsules (360 mg total) by mouth daily.)    insulin glargine 100 UNIT/ML Subcutaneous Solution Inject into the skin as needed.    omeprazole 20 MG Oral Capsule Delayed Release Take 1 capsule (20 mg total) by mouth 2 (two) times daily before meals.    furosemide 20 MG Oral Tab Take 1 tablet (20 mg total) by mouth daily. (Patient not taking: Reported on 4/6/2024)    divalproex ER (DEPAKOTE ER) 500 MG Oral Tablet 24 Hr Take 1 tablet (500 mg total) by mouth nightly as needed. (Patient not taking: Reported on 4/6/2024)       Allergies  Allergies   Allergen Reactions    Iodinated Diagnostic Agents [Radiology Contrast Iodinated Dyes] HIVES     Other reaction(s): IODINATED CONTRAST MEDIA - IV DYE    Penicillins OTHER (SEE COMMENTS)     MOUTH SORES    Scopolamine OTHER (SEE COMMENTS)     Very confused       Review of Systems:   As by Admitting/Attending    Results:   Laboratory Data:  Lab Results   Component Value Date    WBC 6.8 04/07/2024    HGB 12.3 04/07/2024    HCT 34.3 (L) 04/07/2024    .0 (H) 04/07/2024    CREATSERUM 0.85 04/07/2024    BUN 11 04/07/2024     (L) 04/07/2024    K 3.1 (L) 04/07/2024    CL 91 (L) 04/07/2024    CO2 25.0 04/07/2024     (H) 04/07/2024    CA 10.1 04/07/2024    ALB 4.9 (H) 04/07/2024    ALKPHO 120 (H) 04/07/2024    TP 7.9 04/07/2024    AST 34 04/07/2024    ALT 28 04/07/2024    INR 1.0  10/24/2014    T4F 1.0 12/30/2023    TSH 1.277 04/07/2024    LIP 79 01/25/2022    ESRML 19 10/31/2019    CRP 0.69 (H) 04/07/2020    MG 1.8 04/07/2024    PHOS 3.8 01/02/2024    TROP <0.045 04/08/2019     04/06/2020         Imaging:  No results found.    Vital Signs:   Blood pressure 130/76, pulse 59, temperature 98.9 °F (37.2 °C), temperature source Oral, resp. rate 18, height 67\", weight 73.1 kg (161 lb 1.6 oz), SpO2 97%.    Mental Status Exam:   Appearance: Stated age female, in hospital gown, laying down in hospital bed.  Psychomotor: No psychomotor agitation, or retardation.   Orientation: Alert and oriented to person, place, time and condition.  Gait: Not evaluated.  Attitude/Coorperation: Cooperative and attentive.  Behavior: Appropriate.  Speech: Regular rate and rhythm speech.  Mood: Patient reporting depressed mood.  Affect: Anxious affect, congruent with the mood.  Slight improvement in her affect.  Thought process: Linear and appropriate.  Thought content: Patient denies any suicidal or homicidal ideation.  Perceptions: Patient denies any auditory or visual hallucinations.  Concentration: Grossly intact.  Memory: Grossly intact.  Intellect: Average.  Judgment and Insight: Questionable.     Impression:     Generalized anxiety disorder.  Hyponatremia  Hyperglycemia      The patient is a 58-year-old   female with multiple medical condition including Ultra-Screen lymphoma, congestive heart failure and going through a lot who presented to the hospital after she was reporting some dizziness to her .  Blood work indicated sodium level is 118.    Patient today denying any excessive drinking otherwise osmolality has remained low.  Renal    patient is a  lady with an anxiety and well-controlled condition who has been stressed recently with her job and has not filed herself also demonstrating physical symptoms.  Patient have reaction to it with food goodbye and terminology patient.     And she does not feel Trileptal helping    The patient agreeing that she need to make effluxing from medication today  Tried cautiously but very used the hospitalization as a resource and place for temporary..    4/7/2024: The patient demonstrating slight improvement in her mood and demeanor otherwise continue having hyponatremia.  Patient agreeable on changing.    Discussed risk and benefit, acknowledging the current symptom and severity.  At this point, I would recommend the following approach:     Focus on safety  Focus on education and support.  Focus on insight orientation helping the patient understand diagnosis and treatment plan.  Lower Valium to 5 mg nightly.  Start trazodone 25 mg nightly.  Discussed the need to not use home medication  Processed with patient at length, the initiation of the above psychotropic medications I advised the patient of the risks, benefits, alternatives and potential side effects. The patient consents to administration of the medications and understands the right to refuse medications at any time. The patient verbalized understanding.   Coordinate plan with team    Orders This Visit:  Orders Placed This Encounter   Procedures    CBC With Differential With Platelet    Basic Metabolic Panel (8)    Urinalysis, Routine    Creatinine, Urine, Random    Osmolality, Urine    Osmolality, Serum    Sodium, Urine, Random    Uric Acid    Sodium, Serum    Cortisol    TSH W Reflex To Free T4    Drug Abuse Panel 10 screen    Basic Metabolic Panel (8)    Magnesium    CBC With Differential With Platelet    Magnesium    Potassium    Osmolality, Urine    Sodium, Urine, Random    CBC With Differential With Platelet    Basic Metabolic Panel (8)    Magnesium    Sodium, Serum    Hepatic Function Panel (7)       Meds This Visit:  Requested Prescriptions      No prescriptions requested or ordered in this encounter       Taiwo Haley MD  4/7/2024    Note to Patient: The 21st Century Cures Act makes  medical notes like these available to patients in the interest of transparency. However, be advised this is a medical document. It is intended as peer to peer communication. It is written in medical language and may contain abbreviations or verbiage that are unfamiliar. It may appear blunt or direct. Medical documents are intended to carry relevant information, facts as evident, and the clinical opinion of the practitioner. This note may have been transcribed using a voice dictation system. Voice recognition errors may occur. This should not be taken to alter the content or meaning of this note.

## 2024-04-08 NOTE — PLAN OF CARE
Pt alert and oriented. Vitals stable. Cleared for discharge. Pt's valium retrieved from pharmacy and returned to patient.   Problem: Patient Centered Care  Goal: Patient preferences are identified and integrated in the patient's plan of care  Description: Interventions:  - What would you like us to know as we care for you? From home with   - Provide timely, complete, and accurate information to patient/family  - Incorporate patient and family knowledge, values, beliefs, and cultural backgrounds into the planning and delivery of care  - Encourage patient/family to participate in care and decision-making at the level they choose  - Honor patient and family perspectives and choices  Outcome: Progressing     Problem: PAIN - ADULT  Goal: Verbalizes/displays adequate comfort level or patient's stated pain goal  Description: INTERVENTIONS:  - Encourage pt to monitor pain and request assistance  - Assess pain using appropriate pain scale  - Administer analgesics based on type and severity of pain and evaluate response  - Implement non-pharmacological measures as appropriate and evaluate response  - Consider cultural and social influences on pain and pain management  - Manage/alleviate anxiety  - Utilize distraction and/or relaxation techniques  - Monitor for opioid side effects  - Notify MD/LIP if interventions unsuccessful or patient reports new pain  - Anticipate increased pain with activity and pre-medicate as appropriate  Outcome: Progressing     Problem: SAFETY ADULT - FALL  Goal: Free from fall injury  Description: INTERVENTIONS:  - Assess pt frequently for physical needs  - Identify cognitive and physical deficits and behaviors that affect risk of falls.  - Shunk fall precautions as indicated by assessment.  - Educate pt/family on patient safety including physical limitations  - Instruct pt to call for assistance with activity based on assessment  - Modify environment to reduce risk of injury  - Provide  assistive devices as appropriate  - Consider OT/PT consult to assist with strengthening/mobility  - Encourage toileting schedule  Outcome: Progressing     Problem: DISCHARGE PLANNING  Goal: Discharge to home or other facility with appropriate resources  Description: INTERVENTIONS:  - Identify barriers to discharge w/pt and caregiver  - Include patient/family/discharge partner in discharge planning  - Arrange for needed discharge resources and transportation as appropriate  - Identify discharge learning needs (meds, wound care, etc)  - Arrange for interpreters to assist at discharge as needed  - Consider post-discharge preferences of patient/family/discharge partner  - Complete POLST form as appropriate  - Assess patient's ability to be responsible for managing their own health  - Refer to Case Management Department for coordinating discharge planning if the patient needs post-hospital services based on physician/LIP order or complex needs related to functional status, cognitive ability or social support system  Outcome: Progressing     Problem: GENITOURINARY - ADULT  Goal: Absence of urinary retention  Description: INTERVENTIONS:  - Assess patient’s ability to void and empty bladder  - Monitor intake/output and perform bladder scan as needed  - Follow urinary retention protocol/standard of care  - Consider collaborating with pharmacy to review patient's medication profile  - Implement strategies to promote bladder emptying  Outcome: Progressing     Problem: METABOLIC/FLUID AND ELECTROLYTES - ADULT  Goal: Electrolytes maintained within normal limits  Description: INTERVENTIONS:  - Monitor labs and rhythm and assess patient for signs and symptoms of electrolyte imbalances  - Administer electrolyte replacement as ordered  - Monitor response to electrolyte replacements, including rhythm and repeat lab results as appropriate  - Fluid restriction as ordered  - Instruct patient on fluid and nutrition restrictions as  appropriate  Outcome: Progressing  Goal: Hemodynamic stability and optimal renal function maintained  Description: INTERVENTIONS:  - Monitor labs and assess for signs and symptoms of volume excess or deficit  - Monitor intake, output and patient weight  - Monitor urine specific gravity, serum osmolarity and serum sodium as indicated or ordered  - Monitor response to interventions for patient's volume status, including labs, urine output, blood pressure (other measures as available)  - Encourage oral intake as appropriate  - Instruct patient on fluid and nutrition restrictions as appropriate  Outcome: Progressing     Problem: SKIN/TISSUE INTEGRITY - ADULT  Goal: Skin integrity remains intact  Description: INTERVENTIONS  - Assess and document risk factors for pressure ulcer development  - Assess and document skin integrity  - Monitor for areas of redness and/or skin breakdown  - Initiate interventions, skin care algorithm/standards of care as needed  Outcome: Progressing     Problem: Patient/Family Goals  Goal: Patient/Family Long Term Goal  Description: Patient's Long Term Goal: Discharge from the hospital    Interventions:  - Monitor vital signs  - Monitor appropriate labs  - Monitor blood glucose levels  - Pain management  - Administer medications per order  - Follow MD orders  - Diagnostics per order  - Update / inform patient and family on plan of care  - Discharge planning  - See additional Care Plan goals for specific interventions  Outcome: Progressing  Goal: Patient/Family Short Term Goal  Description: Patient's Short Term Goal: Improve sodium levels    Interventions:   -  Monitor vital signs  - Monitor appropriate labs  - Monitor blood glucose levels  - Pain management  - Administer medications per order  - Follow MD orders  - Diagnostics per order  - Update / inform patient and family on plan of care  - See additional Care Plan goals for specific interventions  Outcome: Progressing

## 2024-04-08 NOTE — PROGRESS NOTES
[facility]  part of [facility]    Progress Note      Subjective:     Patient is lying comfortably- feels better      Review of Systems:   Constitutional: negative for fatigue, fevers and weight loss  Eyes: negative for irritation, redness and visual disturbance  Ears, nose, mouth, throat, and face: negative for hearing loss and sore throat  Respiratory: negative for cough, hemoptysis and wheezing  Cardiovascular: negative for chest pain, exertional dyspnea, lower extremity edema  Gastrointestinal: negative for abdominal pain, diarrhea and nausea  Genitourinary:negative for dysuria, frequency and hematuria  Hematologic/lymphatic: negative for bleeding and easy bruising  Musculoskeletal:negative for back pain, bone pain and muscle weakness  Neurological: negative for gait problems, memory problems and seizures    Objective:   Temp:  [97.8 °F (36.6 °C)-99.1 °F (37.3 °C)] 99.1 °F (37.3 °C)  Pulse:  [57-60] 57  Resp:  [18] 18  BP: (119-136)/(76-86) 119/77  SpO2:  [97 %-100 %] 98 %  SpO2: 98 %     Intake/Output Summary (Last 24 hours) at 4/8/2024 1055  Last data filed at 4/8/2024 0900  Gross per 24 hour   Intake 1110 ml   Output 2700 ml   Net -1590 ml     Wt Readings from Last 3 Encounters:   04/08/24 166 lb (75.3 kg)   12/31/23 163 lb 8 oz (74.2 kg)   08/16/22 144 lb (65.3 kg)       General appearance: alert, appears stated age and cooperative  Head: Normocephalic, atraumatic  Eyes: conjunctivae/corneas clear  Throat: lips, mucosa, and tongue normal; teeth and gums normal  Neck:  no JVD, supple  Extremities: extremities normal, no edema  Skin: No rashes or lesions  Neurologic: Grossly normal  Psychiatric: calm    Medications:  Current Facility-Administered Medications   Medication Dose Route Frequency    dilTIAZem ER (CardIZEM CD) 24 hr cap 180 mg  180 mg Oral BID    diazePAM (Valium) tab 5 mg  5 mg Oral Nightly    traZODone (Desyrel) tab 25 mg  25 mg Oral Nightly    chlordiazePOXIDE (Librium) cap  5 mg  5 mg Oral BID PRN    sodium chloride 0.9% infusion   Intravenous Continuous    sodium chloride tab 1 g  1 g Oral Daily    lisinopril (Zestril) tab 10 mg  10 mg Oral BID    pantoprazole (Protonix) DR tab 40 mg  40 mg Oral QAM AC    glucose (Dex4) 15 GM/59ML oral liquid 15 g  15 g Oral Q15 Min PRN    Or    glucose (Glutose) 40% oral gel 15 g  15 g Oral Q15 Min PRN    Or    glucose-vitamin C (Dex-4) chewable tab 4 tablet  4 tablet Oral Q15 Min PRN    Or    dextrose 50% injection 50 mL  50 mL Intravenous Q15 Min PRN    Or    glucose (Dex4) 15 GM/59ML oral liquid 30 g  30 g Oral Q15 Min PRN    Or    glucose (Glutose) 40% oral gel 30 g  30 g Oral Q15 Min PRN    Or    glucose-vitamin C (Dex-4) chewable tab 8 tablet  8 tablet Oral Q15 Min PRN    enoxaparin (Lovenox) 40 MG/0.4ML SUBQ injection 40 mg  40 mg Subcutaneous Daily    acetaminophen (Tylenol Extra Strength) tab 500 mg  500 mg Oral Q4H PRN    acetaminophen (Tylenol) tab 650 mg  650 mg Oral Q4H PRN    temazepam (Restoril) cap 15 mg  15 mg Oral Nightly PRN    polyethylene glycol (PEG 3350) (Miralax) 17 g oral packet 17 g  17 g Oral Daily PRN    sennosides (Senokot) tab 17.2 mg  17.2 mg Oral Nightly PRN    bisacodyl (Dulcolax) 10 MG rectal suppository 10 mg  10 mg Rectal Daily PRN    fleet enema (Fleet) 7-19 GM/118ML rectal enema 133 mL  1 enema Rectal Once PRN    ondansetron (Zofran) 4 MG/2ML injection 4 mg  4 mg Intravenous Q6H PRN    insulin aspart (NovoLOG) 100 Units/mL FlexPen 1-5 Units  1-5 Units Subcutaneous TID CC and HS              Results:     Recent Labs   Lab 04/06/24  1504 04/07/24  0651 04/08/24  0455   RBC 4.39 4.31 3.72*   HGB 12.3 12.3 10.9*   HCT 34.6* 34.3* 31.0*   MCV 78.8* 79.6* 83.3   NEPRELIM 3.91 2.18 1.79   WBC 8.1 6.8 7.1   .0* 663.0* 574.0*     Recent Labs   Lab 04/06/24  1504 04/06/24 2000 04/07/24  0651 04/07/24  0749 04/07/24  1201 04/07/24  1710 04/07/24  2318 04/08/24  0455 04/08/24  0812   *  --  104*  --   --   --    --  93  --    BUN 9  --  11  --   --   --   --  8*  --    CREATSERUM 0.76  --  0.85  --   --   --   --  0.71  --    CA 9.8  --  10.1  --   --   --   --  8.9  --    ALB  --   --   --   --  4.9*  --   --   --   --    *   < > 122*  122*   < > 121*   < > 127* 130*  130* 131*   K 3.6  --  3.1*  --   --   --   --  4.0  4.0  --    CL 87*  --  91*  --   --   --   --  102  --    CO2 25.0  --  25.0  --   --   --   --  24.0  --    ALKPHO  --   --   --   --  120*  --   --   --   --    AST  --   --   --   --  34  --   --   --   --    ALT  --   --   --   --  28  --   --   --   --    BILT  --   --   --   --  0.3  --   --   --   --    TP  --   --   --   --  7.9  --   --   --   --     < > = values in this interval not displayed.     PROTHROM TIME INTERNAT.RATIO   Date Value Ref Range Status   10/24/2014 1.0  Final     Comment:     Only the INR (not the Protime value) should be utilized for  the monitoring of oral anticoagulant therapy.  Recommended therapeutic ranges for anticoagulant therapy are  as follows:  2.0 - 3.0 All indications except for mechanical prosthetic  cardiac valves.  2.5 - 3.5 Mechanical prosthetic cardiac valves.       No results for input(s): \"BNP\" in the last 168 hours.  Recent Labs   Lab 04/07/24  0651 04/08/24  0455   MG 1.8 1.8        Recent Labs   Lab 04/07/24  1201   ALB 4.9*       No results found.  EKG 12 Lead    Result Date: 4/7/2024  Normal sinus rhythm Minimal voltage criteria for LVH, may be normal variant ( R in aVL ) Borderline ECG When compared with ECG of 30-DEC-2023 16:08, No significant change was found Confirmed by NOEL GARCIA ([id number]) on 4/7/2024 6:25:35 PM     Assessment and Plan:       59 y/o F with h/o diabetes, HTN, breast ca, hodgkin's lymphoma , with episode of hyponatremia in dec ( thought sec to hydrochlorothiazide and Depakote and was stopped at dc) had blood work done earlier today for unstable gait, and not feeling her usual self. Labs showed sodium of 119 and came to ER.     Here repeat sodium is 118, serum uric acid 2.2 , urine osm pending, urine sodium is 61  3 weeks back started trileptal  ( known to cause siadh)   States no other new medications. Fluid intake has been reasonable     Impression:     Hyponatremia: urine studies suggestive of SIADH , likely cause being trileptal . Currently on hold. Improve Na 118 on admit and now 131. appropriate rise . TSH and cortisol wnl. S/p salt tablets     HTN : well controlled on lisinopril           Plan:     Fluid restriction 1.5-1.8 liters on discharge  Cont.to hold trileptal on discharge  Repeat Na in 2-3 days   Dc IVF    Follow up with Dr. Carlos in 1-2 weeks     Discussed with Nursing and Dr. Coby Diamond MD  4/8/2024

## 2024-04-08 NOTE — DISCHARGE SUMMARY
[facility]  part of [facility]    Discharge Summary    Scar Cline Patient Status:  Inpatient    1965 MRN [id number]   Location [facility] 5SW/SE Attending Héctor Tenorio MD   Hosp Day # 2 PCP Derian Benjamin MD     Date of Admission: 2024 Disposition: Home or Self Care     Date of Discharge: 24      Admitting Diagnosis: Hyponatremia [E87.1]    Hospital Discharge Diagnoses:  Hyponatremia    Lace+ Score: 73  59-90 High Risk  29-58 Medium Risk  0-28   Low Risk.    TCM Follow-Up Recommendation:  LACE > 58: High Risk of readmission after discharge from the hospital.      Problem List:   Patient Active Problem List   Diagnosis    Controlled type 2 diabetes mellitus without complication, without long-term current use of insulin (HCC)    Myopia    Viral warts    Chest pain, atypical    Hypokalemia    Metabolic alkalosis    Dyspnea on exertion    Perioral cyanosis    Acute chest pain    Elevated immune protein in blood    Muscular pain    Chronic bilateral thoracic back pain    Neutrophilic leukocytosis    Thrombocytosis    Abnormal mammogram of right breast    Malignant neoplasm of upper-outer quadrant of right breast in female, estrogen receptor positive (HCC)    Essential hypertension    Insulin dependent type 2 diabetes mellitus (HCC)    Absence of breast, bilateral    Absence of both breasts    Breast abscess    Dehydration    Anorexia    COVID-19    Bleeding from wound    Breast hematoma    Hyponatremia    Constipation, unspecified constipation type    Abdominal pain, acute    Cervicalgia    Myofascial pain    Snoring    Insomnia    Hyperglycemia    Generalized anxiety disorder       Reason for Admission:   Hyponatremia  Confusion  Type 1 diabetes mellitus  HTN  History of breast CA  History of Hodgkin's lymphoma  Pancreatic insufficiency  Gastroparesis     Physical Exam:   General appearance: alert, appears stated age and cooperative  Pulmonary:  clear to  auscultation bilaterally  Cardiovascular: S1, S2 normal, no murmur, click, rub or gallop, regular rate and rhythm  Abdominal: soft, non-tender; bowel sounds normal; no masses,  no organomegaly  Extremities: extremities normal, atraumatic, no cyanosis or edema  Psychiatric: calm      History of Present Illness:   Per Dr. Dumont  This is a 58 year oldfemale who was sent in for evaluation of generalized weakness and feeling unsteady.  Patient states symptoms have been progressively worsening over the past week or so.  Patient went to see her primary care physician who ordered blood work.  Patient was found to have low sodium and recommended to present to the ED for further evaluation.  At time of interview, patient reported still feeling generally weak.  Stated she was attempting to work out earlier in the day and was feeling unsteady as if she was going to fall prompting her to stop and rest.  Upon further review, patient stated she was started on trileptal about 3 weeks prior.  Patient otherwise denies chest pain, shortness of breath, abdominal pain, nausea vomiting, fevers or chills.     Hospital Course:   Hyponatremia  -Patient presenting with mild confusion, lower extremity edema  -Noted to have sodium levels of 118.  -Previous episode of hyponatremia attributed to hydrochlorothiazide and Depakote  -Nephrology consulted, appreciate recommendations, sodium improved to 124 but back down to 121-->127-->130  -will d/w Dr. Pérez  -started ivf  -will d/w nephrology, plan home later today  -plan fluid restriction and OP labs     Confusion  Acute Metabolic encephalopathy  -related hyponatremia  -improved     Type 1 diabetes mellitus  -Secondary to pancreatic insufficiency  -Has home insulin pump, while hospitalized sliding scale insulin given recent lower blood sugars  -d/w dheanna, given hba1c 5.8,  -Endocrinology on consult, appreciate recommendations  -ssi       HTN  -Uncontrolled in the ER likely situational now  improved appreciate cardiology evaluation  - controlled  -cont lisinopril  - Monitor and adjust as needed     Other problems  History of breast CA  History of Hodgkin's lymphoma  Pancreatic insufficiency  Gastroparesis     Consultations:   Nephrology  Cardiology  Endocrinology    Procedures: n/a    Complications: n/a    Discharge Condition: Good    Discharge Medications:      Discharge Medications        CONTINUE taking these medications        Instructions Prescription details   acetaminophen 500 MG Tabs  Commonly known as: Tylenol Extra Strength      Take 2 tablets (1,000 mg total) by mouth 2 (two) times daily as needed for Pain.   Refills: 0     dilTIAZem HCl ER Coated Beads 180 MG Cp24  Commonly known as: Cartia XT      Take 1 capsule (180 mg total) by mouth daily.   Quantity: 60 capsule  Refills: 3     ibuprofen 800 MG Tabs  Commonly known as: Motrin      Take 1 tablet (800 mg total) by mouth 2 (two) times daily as needed for Pain.   Refills: 0     insulin glargine 100 UNIT/ML Soln  Commonly known as: Lantus      Inject into the skin as needed.   Refills: 0     lisinopril 10 MG Tabs  Commonly known as: Zestril      Take 1 tablet (10 mg total) by mouth in the morning and 1 tablet (10 mg total) before bedtime.   Refills: 0     omeprazole 20 MG Cpdr  Commonly known as: PriLOSEC      Take 1 capsule (20 mg total) by mouth 2 (two) times daily before meals.   Refills: 0            STOP taking these medications      Depakote  MG Tb24  Generic drug: divalproex ER        furosemide 20 MG Tabs  Commonly known as: Lasix                 Follow up Visits: Follow-up with pcp; in 1 week    Follow up Labs: bmp     Other Discharge Instructions: follow-up with nephrology as instructed    NAV MADRID MD  4/8/2024  7:21 AM    > 35 min

## 2024-04-08 NOTE — PROGRESS NOTES
Progress Note     Scar Cline Patient Status:  Inpatient    1965 MRN [id number]   Location [facility] 5SW/SE Attending Héctor Tenorio MD   Hosp Day # 2 PCP Derian Benjamin MD     Chief Complaint: hyponatremia    Subjective:   S: Patient to be slower gait was altered at home  Patient blood work revealed a sodium of 118  Medication was Trileptal which was newly started  Feels much better  Discussed labs    Review of Systems:   10 point ROS completed and was negative, except for pertinent positive and negatives stated in subjective.    Objective:   Vital signs:  Temp:  [97.8 °F (36.6 °C)-98.9 °F (37.2 °C)] 97.8 °F (36.6 °C)  Pulse:  [59-67] 60  Resp:  [18] 18  BP: (130-137)/(76-86) 136/86  SpO2:  [97 %-100 %] 100 %    Wt Readings from Last 6 Encounters:   24 166 lb (75.3 kg)   23 163 lb 8 oz (74.2 kg)   22 144 lb (65.3 kg)   22 148 lb (67.1 kg)   10/04/21 155 lb (70.3 kg)   21 134 lb (60.8 kg)         Physical Exam:    General: No acute distress. Alert ,         Respiratory: Clear to auscultation bilaterally. No wheezes. No rhonchi.  Cardiovascular: S1, S2. Regular rate and rhythm. No murmurs, rubs or gallops.   Abdomen: Soft, nontender, nondistended.  Positive bowel sounds. No rebound or guarding.  Neurologic: No focal neurological deficits.   Musculoskeletal: Moves all extremities.  Extremities: No edema.    Results:   Diagnostic Data:      Labs:    Labs Last 24 Hours:   BMP     CBC    Other     Na 130; 130 Cl 102 BUN 8 Glu 93   Hb 10.9   PTT - Procal -   K 4.0; 4.0 CO2 24.0 Cr 0.71   WBC 7.1 >< .0  INR - CRP -   Renal Lytes Endo    Hct 31.0   Trop - D dim -   eGFR - Ca 8.9 POC Gluc  120    LFT   pBNP - Lactic -   eGFR AA - PO4 - A1c -   AST 34 APk 120 Prot 7.9  LDL -     Mg 1.8 TSH -   ALT 28 T harman 0.3 Alb 4.9        COVID-19 Lab Results    COVID-19  Lab Results   Component Value Date    COVID19 Not Detected 2022    COVID19 Not Detected  04/30/2021    COVID19 Not Detected 12/26/2020       Pro-Calcitonin  No results for input(s): \"PCT\" in the last 168 hours.    Cardiac  No results for input(s): \"TROP\", \"PBNP\" in the last 168 hours.    Creatinine Kinase  No results for input(s): \"CK\" in the last 168 hours.    Inflammatory Markers  No results for input(s): \"CRP\", \"PATRICK\", \"LDH\", \"DDIMER\" in the last 168 hours.    Imaging: Imaging data reviewed in Epic.    Medications:    dilTIAZem HCl ER Coated Beads  180 mg Oral BID    diazePAM  5 mg Oral Nightly    traZODone  25 mg Oral Nightly    sodium chloride  1 g Oral Daily    lisinopril  10 mg Oral BID    pantoprazole  40 mg Oral QAM AC    enoxaparin  40 mg Subcutaneous Daily    insulin aspart  1-5 Units Subcutaneous TID CC and HS       Assessment & Plan:   ASSESSMENT / PLAN:     Hyponatremia  -Patient presenting with mild confusion, lower extremity edema  -Noted to have sodium levels of 118.  -Previous episode of hyponatremia attributed to hydrochlorothiazide and Depakote  -Nephrology consulted, appreciate recommendations, sodium improved to 124 but back down to 121-->127-->130  -will d/w Dr. Pérez  -started ivf  -will d/w nephrology, plan home later today     Confusion  -improved     Type 1 diabetes mellitus  -Secondary to pancreatic insufficiency  -Has home insulin pump, while hospitalized sliding scale insulin given recent lower blood sugars  -d/w dheanna, given hba1c 5.8,  -Endocrinology on consult, appreciate recommendations  -ssi      HTN  -Uncontrolled in the ER likely situational now improved appreciate cardiology evaluation  - controlled  -cont lisinopril  - Monitor and adjust as needed     Other problems  History of breast CA  History of Hodgkin's lymphoma  Pancreatic insufficiency  Gastroparesis         Quality:  DVT Prophylaxis: lovenox   CODE status: full  Martin:    Central line: n/a  Dispo: further recs pending clinical course      Will the patient be referred to TCC on discharge?: yes  Estimated  date of discharge: later today  Discharge is dependent on: pending nephrology f/u  At this point Ms. Joseph Cline is expected to be discharge to: home    Plan of care discussed with patient,nursing, and family    Outpatient records or previous hospital records reviewed.   Patient and/or patient's family given opportunity to ask questions and note understanding and agreeing with therapeutic plan as outlined     Coordinated care with providers and counseling re: treatment plan and workup    MDM: High complexity     NAV MADRID MD    Supplementary Documentation:

## 2024-04-08 NOTE — PLAN OF CARE
Problem: Patient Centered Care  Goal: Patient preferences are identified and integrated in the patient's plan of care  Description: Interventions:  - What would you like us to know as we care for you? From home with   - Provide timely, complete, and accurate information to patient/family  - Incorporate patient and family knowledge, values, beliefs, and cultural backgrounds into the planning and delivery of care  - Encourage patient/family to participate in care and decision-making at the level they choose  - Honor patient and family perspectives and choices  4/8/2024 1236 by Alondra Burciaga RN  Outcome: Completed  4/8/2024 1234 by Alondra Burciaga RN  Outcome: Progressing     Problem: PAIN - ADULT  Goal: Verbalizes/displays adequate comfort level or patient's stated pain goal  Description: INTERVENTIONS:  - Encourage pt to monitor pain and request assistance  - Assess pain using appropriate pain scale  - Administer analgesics based on type and severity of pain and evaluate response  - Implement non-pharmacological measures as appropriate and evaluate response  - Consider cultural and social influences on pain and pain management  - Manage/alleviate anxiety  - Utilize distraction and/or relaxation techniques  - Monitor for opioid side effects  - Notify MD/LIP if interventions unsuccessful or patient reports new pain  - Anticipate increased pain with activity and pre-medicate as appropriate  4/8/2024 1236 by Alondra Burciaga RN  Outcome: Completed  4/8/2024 1234 by Alondra Burciaga RN  Outcome: Progressing     Problem: SAFETY ADULT - FALL  Goal: Free from fall injury  Description: INTERVENTIONS:  - Assess pt frequently for physical needs  - Identify cognitive and physical deficits and behaviors that affect risk of falls.  - [place] fall precautions as indicated by assessment.  - Educate pt/family on patient safety including physical limitations  - Instruct pt to call for assistance with activity based  on assessment  - Modify environment to reduce risk of injury  - Provide assistive devices as appropriate  - Consider OT/PT consult to assist with strengthening/mobility  - Encourage toileting schedule  4/8/2024 1236 by Alondra Burciaga RN  Outcome: Completed  4/8/2024 1234 by Alondra Burciaga RN  Outcome: Progressing     Problem: DISCHARGE PLANNING  Goal: Discharge to home or other facility with appropriate resources  Description: INTERVENTIONS:  - Identify barriers to discharge w/pt and caregiver  - Include patient/family/discharge partner in discharge planning  - Arrange for needed discharge resources and transportation as appropriate  - Identify discharge learning needs (meds, wound care, etc)  - Arrange for interpreters to assist at discharge as needed  - Consider post-discharge preferences of patient/family/discharge partner  - Complete POLST form as appropriate  - Assess patient's ability to be responsible for managing their own health  - Refer to Case Management Department for coordinating discharge planning if the patient needs post-hospital services based on physician/LIP order or complex needs related to functional status, cognitive ability or social support system  4/8/2024 1236 by Alondra Burciaga RN  Outcome: Completed  4/8/2024 1234 by Alondra Burciaga RN  Outcome: Progressing     Problem: GENITOURINARY - ADULT  Goal: Absence of urinary retention  Description: INTERVENTIONS:  - Assess patient’s ability to void and empty bladder  - Monitor intake/output and perform bladder scan as needed  - Follow urinary retention protocol/standard of care  - Consider collaborating with pharmacy to review patient's medication profile  - Implement strategies to promote bladder emptying  4/8/2024 1236 by Alondra Burciaga RN  Outcome: Completed  4/8/2024 1234 by Alondra Burciaga RN  Outcome: Progressing     Problem: METABOLIC/FLUID AND ELECTROLYTES - ADULT  Goal: Electrolytes maintained within normal  limits  Description: INTERVENTIONS:  - Monitor labs and rhythm and assess patient for signs and symptoms of electrolyte imbalances  - Administer electrolyte replacement as ordered  - Monitor response to electrolyte replacements, including rhythm and repeat lab results as appropriate  - Fluid restriction as ordered  - Instruct patient on fluid and nutrition restrictions as appropriate  4/8/2024 1236 by Alondra Burciaga RN  Outcome: Completed  4/8/2024 1234 by Alondra Burciaga RN  Outcome: Progressing  Goal: Hemodynamic stability and optimal renal function maintained  Description: INTERVENTIONS:  - Monitor labs and assess for signs and symptoms of volume excess or deficit  - Monitor intake, output and patient weight  - Monitor urine specific gravity, serum osmolarity and serum sodium as indicated or ordered  - Monitor response to interventions for patient's volume status, including labs, urine output, blood pressure (other measures as available)  - Encourage oral intake as appropriate  - Instruct patient on fluid and nutrition restrictions as appropriate  4/8/2024 1236 by Alondra Burciaga RN  Outcome: Completed  4/8/2024 1234 by Alondra Burciaga RN  Outcome: Progressing     Problem: SKIN/TISSUE INTEGRITY - ADULT  Goal: Skin integrity remains intact  Description: INTERVENTIONS  - Assess and document risk factors for pressure ulcer development  - Assess and document skin integrity  - Monitor for areas of redness and/or skin breakdown  - Initiate interventions, skin care algorithm/standards of care as needed  4/8/2024 1236 by Alondra Burciaga RN  Outcome: Completed  4/8/2024 1234 by Alondra Burciaga RN  Outcome: Progressing     Problem: Patient/Family Goals  Goal: Patient/Family Long Term Goal  Description: Patient's Long Term Goal: Discharge from the hospital    Interventions:  - Monitor vital signs  - Monitor appropriate labs  - Monitor blood glucose levels  - Pain management  - Administer medications per  order  - Follow MD orders  - Diagnostics per order  - Update / inform patient and family on plan of care  - Discharge planning  - See additional Care Plan goals for specific interventions  4/8/2024 1236 by Alondra Burciaga RN  Outcome: Completed  4/8/2024 1234 by Alondra Burciaga RN  Outcome: Progressing  Goal: Patient/Family Short Term Goal  Description: Patient's Short Term Goal: Improve sodium levels    Interventions:   -  Monitor vital signs  - Monitor appropriate labs  - Monitor blood glucose levels  - Pain management  - Administer medications per order  - Follow MD orders  - Diagnostics per order  - Update / inform patient and family on plan of care  - See additional Care Plan goals for specific interventions  4/8/2024 1236 by Alondra Burciaga RN  Outcome: Completed  4/8/2024 1234 by Alondra Burciaga RN  Outcome: Progressing

## 2024-04-09 NOTE — PROGRESS NOTES
Patient is a 58 year old   female with history of breast cancer HTN, Hodgkin's disease, congestive heart failure and history of anxiety who presented to the hospital with drowsiness and mild headache.  Patient found with hyponatremia and sodium 118.  Patient admitted to medical floor and psych consult requested for evaluation and advice.    Consult Duration     The patient seen for over 35 minutes, follow-up evaluation, over 50% counseling and coordinating care addressing anxiety and insomnia.    Record reviewed, communication with attending, communication with RN and patient seen face to face evaluation.    History of Present Illness:     The patient seen today in her room and the patient presented pleasant, calm and cooperative.  The patient reporting that she slept really well last night stating that she slept 4 hours which is recently well for her especially in an environment such hospital.    The patient admitted that she was anxious about lowering Valium and the discussion but she believes the outcome was positive and she is happy.  The patient demonstrated improvement in her affect today and she was more engageable and cooperative with more smiles than ever seen.    Otherwise the patient denying any panic feeling, denies any anxiety other than being in the hospital reporting that she is looking forward to go home.  Sodium level today is 131.  Patient denies any manic symptoms, mood disturbance or any cognitive distortion.    The patient is not demonstrating any renay or psychosis  The patient denies auditory or visual hallucinations  The patient denies suicidal or homicidal ideation.    Past Psychiatric/Medication History:  1. Prior diagnoses: Anxiety  2. Past psychiatric inpatient: 1 previous hospitalization  3. Past outpatient history: Follow-up by Dr. Haley  4. Past suicide history: None  5. Medication history: Trileptal    Social History:   Lives with her  were present  independently of.  History of social drinking    Fa none gena History:  Unknown  Medical History:   Past Medical History  Past Medical History:   Diagnosis Date    Asplenia after surgical procedure 1984    Breast CA (HCC) 2019    right breast-bilat mastectomy    CMV hepatitis (HCC) 1997    Congestive heart disease (HCC)     dx 2019 dec-resolved now    COVID-19     positive 3/2020 - hospitalized-has had negative nasal swabs , last week last one    Gastroparesis     High blood pressure     Hodgkin's disease (HCC) 1984    3B-chemo    Insulin dependent type 2 diabetes mellitus (HCC) 12/23/2019    Neuropathy 1984    R arm, hand, legs    Pancreatic insufficiency (HCC)     Personal history of antineoplastic chemotherapy 1984    Pneumococcal sepsis (HCC) 1994    sepsis R/T pneumococcal bacteria     Serratia 2005    Stress fracture of hip 2013    hip pinning    Type 1 diabetes mellitus (HCC)     Type 1-C    Visual impairment     WEARS GLASSES/CONTACT       Past Surgical History  Past Surgical History:   Procedure Laterality Date    APPENDECTOMY  1992    CHEMOTHERAPY  1984    Hodgkin;s disease-3B    COLONOSCOPY  03/21/2014    COLONOSCOPY N/A 10/19/2019    Procedure: COLONOSCOPY;  Surgeon: Eric De Jesus MD;  Location: [facility] END[facility]    COLONOSCOPY N/A 07/18/2020    Procedure: COLONOSCOPY;  Surgeon: Eric De Jesus MD;  Location: [facility] ENDOSCOPY    FRACTURE SURGERY Right 09/05/2013    Right femoral neck stress fracture percutaneous pinning    HIP SURGERY Left 2010    left, no pins    IMPLANT LEFT  12/28/2020    IMPLANT RIGHT Bilateral 12/28/2020    Removal of bilateral silicone breast implants; Placement of permanent implants,  Autologous fat grafting from abdomen, flanks, and inner thighs    FELISHA LOCALIZATION WIRE 1 SITE RIGHT (CPT=19281)      1994    MASTECTOMY LEFT      MASTECTOMY RIGHT  12/23/2019    Bilateral skin sparing mastectomy with right breast injection    REMOVE TISSUE EXPANDER(S)  08/06/2020    Removal  of bilateral tissue expanders,  Placement of permanent silicone gel implant...    REMOVE TISSUE EXPANDER(S) Left 06/18/2020    Removal of left breast tissue expander. Evacuation and washout of left breast hematoma....    REMOVE TISSUE EXPANDER(S) Left 02/29/2020    Left breast tissue expander removal. Irrigation and washout, left breast. Complete capsulectomy, left breast    SPLENECTOMY  1984       Family History  Family History   Problem Relation Age of Onset    Glaucoma Mother     Hypertension Mother     Heart Disease Father         CAD    Hypertension Father     Other (Other) Father         polymyalgia rheumatic    Breast Cancer Maternal Grandmother 80        post menopausal    Genetic Disease Other 5        Neurofibromatosis    Other (non Hodgkin's lymphoma follicular) Sister 53    Other (epilepsy) Son     No Known Problems Daughter     Cancer Self         hodgkins 1994    Diabetes Neg        Social History  Social History     Socioeconomic History    Marital status:    Occupational History    Occupation:    Tobacco Use    Smoking status: Never    Smokeless tobacco: Never   Vaping Use    Vaping Use: Never used   Substance and Sexual Activity    Alcohol use: No    Drug use: No   Other Topics Concern    Caffeine Concern Yes     Comment: coffee-1 cup/day    Pt has a pacemaker No    Pt has a defibrillator No    Reaction to local anesthetic No     Social Determinants of Health     Food Insecurity: No Food Insecurity (4/6/2024)    Food Insecurity     Food Insecurity: Never true   Transportation Needs: No Transportation Needs (4/6/2024)    Transportation Needs     Lack of Transportation: No   Housing Stability: Low Risk  (4/6/2024)    Housing Stability     Housing Instability: No           Current Medications:      No medications prior to admission.       Allergies  Allergies   Allergen Reactions    Iodinated Diagnostic Agents [Radiology Contrast Iodinated Dyes] HIVES     Other reaction(s): IODINATED  CONTRAST MEDIA - IV DYE    Penicillins OTHER (SEE COMMENTS)     MOUTH SORES    Scopolamine OTHER (SEE COMMENTS)     Very confused       Review of Systems:   As by Admitting/Attending    Results:   Laboratory Data:  Lab Results   Component Value Date    WBC 7.1 04/08/2024    HGB 10.9 (L) 04/08/2024    HCT 31.0 (L) 04/08/2024    .0 (H) 04/08/2024    CREATSERUM 0.71 04/08/2024    BUN 8 (L) 04/08/2024     (L) 04/08/2024    K 4.0 04/08/2024    K 4.0 04/08/2024     04/08/2024    CO2 24.0 04/08/2024    GLU 93 04/08/2024    CA 8.9 04/08/2024    ALB 4.9 (H) 04/07/2024    ALKPHO 120 (H) 04/07/2024    TP 7.9 04/07/2024    AST 34 04/07/2024    ALT 28 04/07/2024    INR 1.0 10/24/2014    T4F 1.0 12/30/2023    TSH 1.277 04/07/2024    LIP 79 01/25/2022    ESRML 19 10/31/2019    CRP 0.69 (H) 04/07/2020    MG 1.8 04/08/2024    PHOS 3.8 01/02/2024    TROP <0.045 04/08/2019     04/06/2020         Imaging:  No results found.    Vital Signs:   Blood pressure 119/77, pulse 57, temperature 99.1 °F (37.3 °C), temperature source Oral, resp. rate 18, height 67\", weight 75.3 kg (166 lb), SpO2 98%.    Mental Status Exam:   Appearance: Stated age female, in hospital gown, laying down in hospital bed.  Psychomotor: No psychomotor agitation, or retardation.   Orientation: Alert and oriented to person, place, time and condition.  Gait: Not evaluated.  Attitude/Coorperation: Pleasant, calm and cooperative with appropriate attentiveness and improvement in her facial expression.  Behavior: Appropriate.  Speech: Regular rate and rhythm speech.  Mood: Patient reporting depressed mood.  Affect: Appropriate affect with natural face presentation.  Thought process: Linear and appropriate.  Thought content: Patient denies any suicidal or homicidal ideation.  Perceptions: Patient denies any auditory or visual hallucinations.  Concentration: Grossly intact.  Memory: Grossly intact.  Intellect: Average.  Judgment and Insight:  Questionable.     Impression:     Generalized anxiety disorder.  Hyponatremia  Hyperglycemia      The patient is a 58-year-old   female with multiple medical condition including Ultra-Screen lymphoma, congestive heart failure and going through a lot who presented to the hospital after she was reporting some dizziness to her .  Blood work indicated sodium level is 118.    Patient today denying any excessive drinking otherwise osmolality has remained low.  Renal    patient is a  lady with an anxiety and well-controlled condition who has been stressed recently with her job and has not filed herself also demonstrating physical symptoms.  Patient have reaction to it with food goodbye and terminology patient.    And she does not feel Trileptal helping    The patient agreeing that she need to make effluxing from medication today  Tried cautiously but very used the hospitalization as a resource and place for temporary..    4/7/2024: The patient demonstrating slight improvement in her mood and demeanor otherwise continue having hyponatremia.  Patient agreeable on changing.    4/8/2024: The patient has slept really well on Valium 5 mg nightly and trazodone 25 mg nightly.  Patient did not receive any beta-blocker or hydroxyzine.  Patient has been demonstrating improvement in her facial expression and interaction.    Discussed risk and benefit, acknowledging the current symptom and severity.  At this point, I would recommend the following approach:     Focus on safety  Focus on education and support.  Focus on insight orientation helping the patient understand diagnosis and treatment plan.  Continue Valium 5 mg nightly.  Continue trazodone 25 mg nightly.  Discussed the need to not use home medication  Appropriate to be discharged home and follow-up  Coordinate plan with team    Orders This Visit:  Orders Placed This Encounter   Procedures    CBC With Differential With Platelet    Basic Metabolic  Panel (8)    Urinalysis, Routine    Creatinine, Urine, Random    Osmolality, Urine    Osmolality, Serum    Sodium, Urine, Random    Uric Acid    Cortisol    TSH W Reflex To Free T4    Drug Abuse Panel 10 screen    Basic Metabolic Panel (8)    Magnesium    CBC With Differential With Platelet    Magnesium    Potassium    Osmolality, Urine    Sodium, Urine, Random    CBC With Differential With Platelet    Basic Metabolic Panel (8)    Sodium, Serum    Hepatic Function Panel (7)    Iron And Tibc       Meds This Visit:  Requested Prescriptions     Signed Prescriptions Disp Refills    traZODone 50 MG Oral Tab 30 tablet 0     Sig: Take 0.5 tablets (25 mg total) by mouth nightly.       Taiwo Haley MD  4/8/2024    Note to Patient: The 21st Century Cures Act makes medical notes like these available to patients in the interest of transparency. However, be advised this is a medical document. It is intended as peer to peer communication. It is written in medical language and may contain abbreviations or verbiage that are unfamiliar. It may appear blunt or direct. Medical documents are intended to carry relevant information, facts as evident, and the clinical opinion of the practitioner. This note may have been transcribed using a voice dictation system. Voice recognition errors may occur. This should not be taken to alter the content or meaning of this note.

## 2024-04-10 NOTE — PAYOR COMM NOTE
--------------  ADMISSION REVIEW     Payor: KURT PICKETT  Subscriber #:  [id number]  Authorization Number: [id number]    Admit date: 4/6/24  Admit time:  6:06 PM       History   HPI  Patient presents the emergency department with abnormal laboratory studies.  History obtained from the patient as well as her  .  He states that she has had hyponatremia in the past which was attributed to medication use including Depakote as well as hydrochlorothiazide hypertensive medication.  Over the last several days she has had weakness and \"slowing\".  He took her for some labs today and found her sodium to be 119.  She was recently started on oxcarbazepine which they think may have initiated the hyponatremia today.  She denies pain but feels \"crummy\".    ED Triage Vitals [04/06/24 1429]   /89   Pulse 78   Resp 18   Temp 98.7 °F (37.1 °C)   Temp src Temporal   SpO2 99 %   O2 Device None (Room air)     Physical Exam  HENT:      Head: Normocephalic.      Nose: Nose normal.      Mouth/Throat:      Mouth: Mucous membranes are moist.   Eyes:      Conjunctiva/sclera: Conjunctivae normal.   Cardiovascular:      Rate and Rhythm: Normal rate and regular rhythm.      Heart sounds: No murmur heard.  Pulmonary:      Effort: Pulmonary effort is normal. No respiratory distress.      Breath sounds: Normal breath sounds.   Abdominal:      General: There is no distension.      Palpations: Abdomen is soft.      Tenderness: There is no abdominal tenderness.   Musculoskeletal:         General: No tenderness. Normal range of motion.      Cervical back: Normal range of motion and neck supple.   Skin:     General: Skin is warm and dry.      Capillary Refill: Capillary refill takes less than 2 seconds.      Findings: No rash.   Neurological:      General: No focal deficit present.      Mental Status: She is alert and oriented to person, place, and time.      Cranial Nerves: No cranial nerve deficit.      Sensory: No sensory deficit.       Motor: No weakness.      Coordination: Coordination normal.   Labs Reviewed   BASIC METABOLIC PANEL (8) - Abnormal; Notable for the following components:       Result Value    Glucose 102 (*)     Sodium 118 (*)     Chloride 87 (*)     Calculated Osmolality 245 (*)     All other components within normal limits   URINALYSIS, ROUTINE - Abnormal; Notable for the following components:    Urine Color Colorless (*)     Blood Urine Trace (*)     Bacteria Urine Rare (*)     Squamous Epi. Cells Few (*)     All other components within normal limits   OSMOLALITY, URINE - Abnormal; Notable for the following components:    Osmolality Urine 215 (*)     All other components within normal limits   OSMOLALITY, SERUM - Abnormal; Notable for the following components:    Osmolality Serum 252 (*)     All other components within normal limits   URIC ACID - Abnormal; Notable for the following components:    Uric Acid 2.2 (*)     All other components within normal limits   SODIUM, SERUM - Abnormal; Notable for the following components:    Sodium 123 (*)    SODIUM, SERUM - Abnormal; Notable for the following components:    Sodium 124 (*)     All other components within normal limits   SODIUM, SERUM - Abnormal; Notable for the following components:    Sodium 122 (*)     All other components within normal limits   BASIC METABOLIC PANEL (8) - Abnormal; Notable for the following components:    Glucose 104 (*)     Sodium 122 (*)     Potassium 3.1 (*)     Chloride 91 (*)     Calculated Osmolality 254 (*)     All other components within normal limits   CBC W/ DIFFERENTIAL - Abnormal; Notable for the following components:    HCT 34.6 (*)     MCV 78.8 (*)     .0 (*)     All other components within normal limits   CBC W/ DIFFERENTIAL - Abnormal; Notable for the following components:    HCT 34.3 (*)     MCV 79.6 (*)     .0 (*)    Admission disposition: 4/6/2024  3:39 PM    Disposition and Plan   Clinical Impression:  1. Hyponatremia        Disposition:  Admit  4/6/2024  3:39 pm    HISTORY AND PHYSICAL      DATE OF ADMISSION: 4/6/2024   HISTORY OF PRESENT ILLNESS  This is a 58 year oldfemale who was sent in for evaluation of generalized weakness and feeling unsteady.  Patient states symptoms have been progressively worsening over the past week or so.  Patient went to see her primary care physician who ordered blood work.  Patient was found to have low sodium and recommended to present to the ED for further evaluation.  At time of interview, patient reported still feeling generally weak.  Stated she was attempting to work out earlier in the day and was feeling unsteady as if she was going to fall prompting her to stop and rest.  Upon further review, patient stated she was started on trileptal about 3 weeks prior.  Patient otherwise denies chest pain, shortness of breath, abdominal pain, nausea vomiting, fevers or chills.    Lab 04/06/24  1504   RBC 4.39   HGB 12.3   HCT 34.6*   MCV 78.8*   MCH 28.0   MCHC 35.5   RDW 13.8   NEPRELIM 3.91   WBC 8.1   .0*      Lab 04/06/24  1302 04/06/24  1504   * 102*   BUN 9 9   CREATSERUM 0.77 0.76   CA 9.6 9.8   * 118*   K 4.0 3.6   CL 88* 87*   CO2 24.0 25.0       ASSESSMENT/PLAN       Hyponatremia  -Patient presenting with generalized weakness, unsteady gait  -Sodium noted to be 118 on admission  -Concern for SIADH  -Patient previously started on Trileptal, correlation with hyponatremia, holding at this time  -Due to SIADH, starting fluid restriction.  -Started on salt tabs  -Close monitoring of sodium, every 4 hour check with goal correction of 8 mill equivalents in 24 hours.  -Nephrology on consult, recommend holding tolvaptan at this time.  If fails to improve can consider adding.  Appreciate further recommendations  -Check TSH and cortisol  -Continue to monitor     HTN  - controlled  - CPM  - Monitor and adjust as needed      Hx of Breast CA      Plan of care discussed with patient at  bedside.  Discussed with Nephrology consultant, ED physician and RN. Decision made that pt needs hospitalization for further management/monitoring.        4/7/24  S: Patient to be slower gait was altered at home  Patient blood work revealed a sodium of 118  Medication was Trileptal which was newly started     Temp:  [97.9 °F (36.6 °C)-98.9 °F (37.2 °C)] 98.2 °F (36.8 °C)  Pulse:  [59-78] 67  Resp:  [14-21] 18  BP: (114-160)/(65-92) 137/81  SpO2:  [95 %-100 %] 99 %  Labs Last 24 Hours:                  BMP         CBC       Na 121 Cl 91 BUN 11 Glu 104     Hb 12.3       K 3.1 CO2 25.0 Cr 0.85     WBC 6.8 >< .0        [facility]       Hct 34.3        Ca 10.1 POC Gluc  180                Mg 1.8 TSH 1.277               Medications:    dilTIAZem HCl ER Coated Beads  180 mg Oral BID    sodium chloride  1 g Oral Daily    lisinopril  10 mg Oral BID    pantoprazole  40 mg Oral QAM AC    enoxaparin  40 mg Subcutaneous Daily    insulin aspart  1-5 Units Subcutaneous TID CC and HS         Assessment & Plan:   ASSESSMENT / PLAN:      Hyponatremia  -Patient presenting with mild confusion, lower extremity edema  -Noted to have sodium levels of 118.  -Previous episode of hyponatremia attributed to hydrochlorothiazide and Depakote  -Nephrology consulted, appreciate recommendations, sodium improved to 124 but back down to 121  -will d/w Dr. Pérez  -starting ivf     Confusion  -improved     Type 1 diabetes mellitus  -Secondary to pancreatic insufficiency  -Has home insulin pump, while hospitalized sliding scale insulin given recent lower blood sugars  -d/w dheanna, given hba1c 5.8,  -Endocrinology on consult, appreciate recommendations  -ssi       HTN  -Uncontrolled in the ER likely situational now improved appreciate cardiology evaluation  - controlled  -cont lisinopril  - Monitor and adjust as needed     Other problems  History of breast CA  History of Hodgkin's lymphoma  Pancreatic insufficiency  Gastroparesis       Quality:  DVT Prophylaxis: lovenox       NEPHROLOGY  Assessment and Plan:      57 y/o F with h/o diabetes, HTN, breast ca, hodgkin's lymphoma , with episode of hyponatremia in dec ( thought sec to hydrochlorothiazide and Depakote and was stopped at [facility]) had blood work done earlier today for unstable gait, and not feeling her usual self. Labs showed sodium of 119 and came to ER.    Here repeat sodium is 118, serum uric acid 2.2 , urine osm pending, urine sodium is 61  3 weeks back started trileptal  ( known to cause siadh)   States no other new medications. Fluid intake has been reasonable     Impression:     Hyponatremia: urine studies suggestive of SIADH , likely cause being trileptal . Will plan for holding it and change to some other medication. FR (1.2 L/day) + inc solute intake+ salt tabs . Will have sodium check q 4 hrs. Aim to correct sodium 8 meq in next 24 hrs. May need to use tolvaptan if improvement not seen. TSH ok , cortisol pending     HTN : well controlled on lisinopril      Plan:     Continue salt tabs, + replace k  FR 1.2 L /day  So far sodium improved appropriately -6 to 8 mq in first 24 hrs  Replace K        DATE OF DISCHARGE: 4/8/24  sodium chloride 0.9% infusion  Rate: 100 mL/hr  Freq: Continuous Route: IV  Start: 04/07/24 1915 End: 04/08/24 1100      sodium chloride 0.9% infusion  Rate: 150 mL/hr  Freq: Continuous Route: IV  Start: 04/07/24 1445 End: 04/07/24 1847      sodium chloride tab 1 g  Dose: 1 g  Freq: Once Route: OR  Start: 04/07/24 0945 End: 04/07/24 0932      sodium chloride tab 1 g  Dose: 1 g  Freq: Daily Route: OR  Start: 04/06/24 1615 End: 04/08/24 1437

## 2024-04-11 ENCOUNTER — LAB ENCOUNTER (OUTPATIENT)
Dept: LAB | Facility: HOSPITAL | Age: 59
End: 2024-04-11
Attending: ANESTHESIOLOGY
Payer: COMMERCIAL

## 2024-04-11 DIAGNOSIS — E13.9 DIABETES 1.5, MANAGED AS TYPE 1 (HCC): ICD-10-CM

## 2024-04-11 LAB
ANION GAP SERPL CALC-SCNC: 9 MMOL/L (ref 0–18)
BUN BLD-MCNC: 11 MG/DL (ref 9–23)
BUN/CREAT SERPL: 12.2 (ref 10–20)
CALCIUM BLD-MCNC: 10.2 MG/DL (ref 8.7–10.4)
CHLORIDE SERPL-SCNC: 102 MMOL/L (ref 98–112)
CO2 SERPL-SCNC: 25 MMOL/L (ref 21–32)
CREAT BLD-MCNC: 0.9 MG/DL
EGFRCR SERPLBLD CKD-EPI 2021: 74 ML/MIN/1.73M2 (ref 60–?)
FASTING STATUS PATIENT QL REPORTED: YES
GLUCOSE BLD-MCNC: 101 MG/DL (ref 70–99)
OSMOLALITY SERPL CALC.SUM OF ELEC: 282 MOSM/KG (ref 275–295)
POTASSIUM SERPL-SCNC: 3.9 MMOL/L (ref 3.5–5.1)
SODIUM SERPL-SCNC: 136 MMOL/L (ref 136–145)

## 2024-04-11 PROCEDURE — 36415 COLL VENOUS BLD VENIPUNCTURE: CPT

## 2024-04-11 PROCEDURE — 80048 BASIC METABOLIC PNL TOTAL CA: CPT

## 2024-05-01 ENCOUNTER — LAB ENCOUNTER (OUTPATIENT)
Dept: LAB | Facility: HOSPITAL | Age: 59
End: 2024-05-01
Attending: INTERNAL MEDICINE
Payer: COMMERCIAL

## 2024-05-01 DIAGNOSIS — E87.1 HYPONATREMIA: ICD-10-CM

## 2024-05-01 LAB
ANION GAP SERPL CALC-SCNC: 9 MMOL/L (ref 0–18)
BUN BLD-MCNC: 10 MG/DL (ref 9–23)
BUN/CREAT SERPL: 11.9 (ref 10–20)
CALCIUM BLD-MCNC: 9.9 MG/DL (ref 8.7–10.4)
CHLORIDE SERPL-SCNC: 105 MMOL/L (ref 98–112)
CO2 SERPL-SCNC: 26 MMOL/L (ref 21–32)
CREAT BLD-MCNC: 0.84 MG/DL
EGFRCR SERPLBLD CKD-EPI 2021: 80 ML/MIN/1.73M2 (ref 60–?)
FASTING STATUS PATIENT QL REPORTED: NO
GLUCOSE BLD-MCNC: 131 MG/DL (ref 70–99)
OSMOLALITY SERPL CALC.SUM OF ELEC: 291 MOSM/KG (ref 275–295)
POTASSIUM SERPL-SCNC: 3.2 MMOL/L (ref 3.5–5.1)
SODIUM SERPL-SCNC: 140 MMOL/L (ref 136–145)

## 2024-05-01 PROCEDURE — 36415 COLL VENOUS BLD VENIPUNCTURE: CPT

## 2024-05-01 PROCEDURE — 80048 BASIC METABOLIC PNL TOTAL CA: CPT

## 2024-06-17 ENCOUNTER — TELEPHONE (OUTPATIENT)
Dept: NEPHROLOGY | Facility: CLINIC | Age: 59
End: 2024-06-17

## 2024-06-17 ENCOUNTER — LAB ENCOUNTER (OUTPATIENT)
Dept: LAB | Facility: HOSPITAL | Age: 59
End: 2024-06-17
Attending: INTERNAL MEDICINE

## 2024-06-17 DIAGNOSIS — E87.1 HYPONATREMIA: ICD-10-CM

## 2024-06-17 DIAGNOSIS — E13.9 DIABETES 1.5, MANAGED AS TYPE 1 (HCC): ICD-10-CM

## 2024-06-17 DIAGNOSIS — E87.1 HYPONATREMIA: Primary | ICD-10-CM

## 2024-06-17 LAB
ANION GAP SERPL CALC-SCNC: 8 MMOL/L (ref 0–18)
BUN BLD-MCNC: 10 MG/DL (ref 9–23)
BUN/CREAT SERPL: 10.1 (ref 10–20)
CALCIUM BLD-MCNC: 9.4 MG/DL (ref 8.7–10.4)
CHLORIDE SERPL-SCNC: 106 MMOL/L (ref 98–112)
CO2 SERPL-SCNC: 26 MMOL/L (ref 21–32)
CREAT BLD-MCNC: 0.99 MG/DL
EGFRCR SERPLBLD CKD-EPI 2021: 66 ML/MIN/1.73M2 (ref 60–?)
FASTING STATUS PATIENT QL REPORTED: NO
GLUCOSE BLD-MCNC: 125 MG/DL (ref 70–99)
OSMOLALITY SERPL CALC.SUM OF ELEC: 291 MOSM/KG (ref 275–295)
POTASSIUM SERPL-SCNC: 3.6 MMOL/L (ref 3.5–5.1)
SODIUM SERPL-SCNC: 140 MMOL/L (ref 136–145)

## 2024-06-17 PROCEDURE — 36415 COLL VENOUS BLD VENIPUNCTURE: CPT

## 2024-06-17 PROCEDURE — 80048 BASIC METABOLIC PNL TOTAL CA: CPT

## 2024-06-17 NOTE — TELEPHONE ENCOUNTER
Mariely from [facility] is requesting for lab orders patient is currently there please follow up states patient was advised to have orders June 10th and please call Mariely when orders is in

## 2024-06-17 NOTE — TELEPHONE ENCOUNTER
Patient asking for lab orders; at [facility]  Called- left voice message for patient not to wait- message forwarded to   No visit encounters in Nephrology [facility]

## 2024-08-13 ENCOUNTER — TELEPHONE (OUTPATIENT)
Dept: ENDOCRINOLOGY | Facility: HOSPITAL | Age: 59
End: 2024-08-13

## 2024-08-13 NOTE — TELEPHONE ENCOUNTER
Pt called to ask for her Tandem pump settings that the [facility] has on file from her last visit.  She states that she is going back on a Tandem insulin pump.    Pt was provided with the following settings:      Basal rate(s): 12 MN 0.21 units/hr                I:C 10                                                            CF: 80 mg/dl                Target goals: Daytime 12  mg/dl                Bedtime 2000 130 mg/dl                Active insulin time: 4

## 2024-09-11 ENCOUNTER — APPOINTMENT (OUTPATIENT)
Dept: GENERAL RADIOLOGY | Facility: HOSPITAL | Age: 59
End: 2024-09-11
Attending: ANESTHESIOLOGY
Payer: COMMERCIAL

## 2024-09-11 ENCOUNTER — APPOINTMENT (OUTPATIENT)
Dept: MRI IMAGING | Facility: HOSPITAL | Age: 59
End: 2024-09-11
Attending: EMERGENCY MEDICINE
Payer: COMMERCIAL

## 2024-09-11 ENCOUNTER — HOSPITAL ENCOUNTER (EMERGENCY)
Facility: HOSPITAL | Age: 59
Discharge: HOME | End: 2024-09-11
Attending: EMERGENCY MEDICINE
Payer: COMMERCIAL

## 2024-09-11 VITALS
TEMPERATURE: 99 F | HEIGHT: 67 IN | RESPIRATION RATE: 18 BRPM | OXYGEN SATURATION: 99 % | SYSTOLIC BLOOD PRESSURE: 141 MMHG | BODY MASS INDEX: 22.13 KG/M2 | WEIGHT: 141 LBS | DIASTOLIC BLOOD PRESSURE: 84 MMHG | HEART RATE: 59 BPM

## 2024-09-11 DIAGNOSIS — M51.16 LUMBAR DISC PROLAPSE WITH COMPRESSION RADICULOPATHY: Primary | ICD-10-CM

## 2024-09-11 LAB
ANION GAP SERPL CALC-SCNC: 7 MMOL/L (ref 0–18)
BASOPHILS # BLD AUTO: 0.15 X10(3) UL (ref 0–0.2)
BASOPHILS NFR BLD AUTO: 0.8 %
BUN BLD-MCNC: 14 MG/DL (ref 9–23)
BUN/CREAT SERPL: 15.9 (ref 10–20)
CALCIUM BLD-MCNC: 9.5 MG/DL (ref 8.7–10.4)
CHLORIDE SERPL-SCNC: 102 MMOL/L (ref 98–112)
CO2 SERPL-SCNC: 25 MMOL/L (ref 21–32)
CREAT BLD-MCNC: 0.88 MG/DL
DEPRECATED RDW RBC AUTO: 53.8 FL (ref 35.1–46.3)
EGFRCR SERPLBLD CKD-EPI 2021: 76 ML/MIN/1.73M2 (ref 60–?)
EOSINOPHIL # BLD AUTO: 0.35 X10(3) UL (ref 0–0.7)
EOSINOPHIL NFR BLD AUTO: 2 %
ERYTHROCYTE [DISTWIDTH] IN BLOOD BY AUTOMATED COUNT: 17 % (ref 11–15)
GLUCOSE BLD-MCNC: 113 MG/DL (ref 70–99)
HCT VFR BLD AUTO: 37.4 %
HGB BLD-MCNC: 12.3 G/DL
IMM GRANULOCYTES # BLD AUTO: 0.06 X10(3) UL (ref 0–1)
IMM GRANULOCYTES NFR BLD: 0.3 %
LYMPHOCYTES # BLD AUTO: 5.07 X10(3) UL (ref 1–4)
LYMPHOCYTES NFR BLD AUTO: 28.3 %
MCH RBC QN AUTO: 28.7 PG (ref 26–34)
MCHC RBC AUTO-ENTMCNC: 32.9 G/DL (ref 31–37)
MCV RBC AUTO: 87.2 FL
MONOCYTES # BLD AUTO: 2.19 X10(3) UL (ref 0.1–1)
MONOCYTES NFR BLD AUTO: 12.2 %
NEUTROPHILS # BLD AUTO: 10.08 X10 (3) UL (ref 1.5–7.7)
NEUTROPHILS # BLD AUTO: 10.08 X10(3) UL (ref 1.5–7.7)
NEUTROPHILS NFR BLD AUTO: 56.4 %
OSMOLALITY SERPL CALC.SUM OF ELEC: 279 MOSM/KG (ref 275–295)
PLATELET # BLD AUTO: 542 10(3)UL (ref 150–450)
POTASSIUM SERPL-SCNC: 3.9 MMOL/L (ref 3.5–5.1)
RBC # BLD AUTO: 4.29 X10(6)UL
SODIUM SERPL-SCNC: 134 MMOL/L (ref 136–145)
WBC # BLD AUTO: 17.9 X10(3) UL (ref 4–11)

## 2024-09-11 PROCEDURE — 72148 MRI LUMBAR SPINE W/O DYE: CPT | Performed by: EMERGENCY MEDICINE

## 2024-09-11 PROCEDURE — 3E0S33Z INTRODUCTION OF ANTI-INFLAMMATORY INTO EPIDURAL SPACE, PERCUTANEOUS APPROACH: ICD-10-PCS | Performed by: ANESTHESIOLOGY

## 2024-09-11 PROCEDURE — 99285 EMERGENCY DEPT VISIT HI MDM: CPT

## 2024-09-11 PROCEDURE — 85025 COMPLETE CBC W/AUTO DIFF WBC: CPT | Performed by: EMERGENCY MEDICINE

## 2024-09-11 PROCEDURE — 96376 TX/PRO/DX INJ SAME DRUG ADON: CPT

## 2024-09-11 PROCEDURE — 99284 EMERGENCY DEPT VISIT MOD MDM: CPT

## 2024-09-11 PROCEDURE — 80048 BASIC METABOLIC PNL TOTAL CA: CPT | Performed by: EMERGENCY MEDICINE

## 2024-09-11 PROCEDURE — 85060 BLOOD SMEAR INTERPRETATION: CPT | Performed by: EMERGENCY MEDICINE

## 2024-09-11 PROCEDURE — 96374 THER/PROPH/DIAG INJ IV PUSH: CPT

## 2024-09-11 RX ORDER — MORPHINE SULFATE 4 MG/ML
4 INJECTION, SOLUTION INTRAMUSCULAR; INTRAVENOUS ONCE
Status: COMPLETED | OUTPATIENT
Start: 2024-09-11 | End: 2024-09-11

## 2024-09-11 RX ORDER — METHYLPREDNISOLONE ACETATE 80 MG/ML
INJECTION, SUSPENSION INTRA-ARTICULAR; INTRALESIONAL; INTRAMUSCULAR; SOFT TISSUE AS NEEDED
Status: DISCONTINUED | OUTPATIENT
Start: 2024-09-11 | End: 2024-09-11

## 2024-09-11 RX ORDER — SODIUM CHLORIDE 9 MG/ML
INJECTION, SOLUTION INTRAMUSCULAR; INTRAVENOUS; SUBCUTANEOUS AS NEEDED
Status: DISCONTINUED | OUTPATIENT
Start: 2024-09-11 | End: 2024-09-11

## 2024-09-11 RX ORDER — IOPAMIDOL 408 MG/ML
INJECTION, SOLUTION INTRATHECAL AS NEEDED
Status: DISCONTINUED | OUTPATIENT
Start: 2024-09-11 | End: 2024-09-11

## 2024-09-11 RX ORDER — LIDOCAINE HYDROCHLORIDE 10 MG/ML
INJECTION, SOLUTION EPIDURAL; INFILTRATION; INTRACAUDAL; PERINEURAL AS NEEDED
Status: DISCONTINUED | OUTPATIENT
Start: 2024-09-11 | End: 2024-09-11

## 2024-09-11 RX ORDER — OXYCODONE AND ACETAMINOPHEN 5; 325 MG/1; MG/1
1-2 TABLET ORAL EVERY 6 HOURS PRN
Qty: 20 TABLET | Refills: 0 | Status: SHIPPED | OUTPATIENT
Start: 2024-09-11 | End: 2024-09-16

## 2024-09-11 NOTE — OPERATIVE REPORT
[facility]            Patient: Scar Cline    : 1965        Operative Report        Date of procedure: 2024    Preoperative diagnosis:   1. Lumbar disc prolapse with compression radiculopathy    Right L4 radiculopathy      Postop diagnosis:  1. Lumbar disc prolapse with compression radiculopathy    Right L4 radiculopathy        Procedure:    Transforaminal epidural steroid injection fluoroscopic guidance and possible contrast image saved    Surgeon: Geovanny Rivas Jr., MD    Indications: 59 year old female with Right L4 radiculopathy who failed conservative therapy consented for rt L4 TFESI      Operative procedure:        The patient was brought to the local room suite and placed in the prone position with a pillow under the lower pelvis.  Patient was prepped and draped in normal sterile fashion.  The endplates of the vertebral bodies were aligned.  Fluoroscopic beam was rotated ipsilateral to align the superior articulating process of the vertebra below with the 6 o'clock position of the above pedicle.  Xylocaine 1% was instilled into the skin and subcutaneous tissue over the right L4 level at which point a 22-gauge, 5-1/2 inch spinal needle was introduced and passed under direct fluoroscopic guidance to the bone of the pedicle at the 6 o'clock position.  The needle was then redirected inferiorly to the anterior portion of the foramen.  There was no CSF, paresthesia, or blood noted.  After negative aspiration a solution of Omnipaque 240 contrast media, 1mL was instilled which showed flow into the epidural space as well as an outline of the nerve root tracking distally.  There is no evidence of subarachnoid, subdural, or intravascular spread.  This was checked in both AP and lateral views.  At this point after negative aspiration, a solution of Depo-Medrol 80 mg and 2 mL's of preservative-free normal saline was instilled.  The needle was withdrawn.  A Band-Aid  applied.  The patient tolerated procedure well without complication and was discharged home with instructions.         Electronically approved by:   Geovanny Rivas Jr., MD

## 2024-09-11 NOTE — DISCHARGE INSTRUCTIONS
POST PROCEDURE CARE AND INFECTION PREVENTION:    1.   Your dressing should be removed within 24 hours.  If it falls off before that time, you need not reapply.    2.   You may shower tomorrow.    3.   If necessary, you may apply ice to the injection site for 20 minute intervals to help alleviate any localized discomfort.    4.  The [facility] office number is [phone].  Call and report the following conditions to the [facility]:   -Any excessive bleeding, swelling, or pain at the injection site.   -Any temperature greater than 101 degrees.   -Any excessive itch or rash.   -Any change in your bowel or bladder habits.   -Any increased numbness, tingling, or weakness to the extremities.   -Any persistent, severe headache (especially a headache aggravated by being in   An upright position.  The office is open between the hours of 7:30 am - 2:00pm.  After hours you may leave a message and the nurse will return your call during normal office hours.    5.  Please call the [facility] in one week to schedule an appointment for your follow up visit unless instructed differently by your doctor.  If you need to cancel or reschedule any appointment, please call as soon as possible.    6.  You may return to school or work per your doctor's instructions.    7.  Wash your hands before and after touching your dressing.  Be sure to rub your hands together with warm water and soap for 20 seconds or longer when washing.        MEDICATION:    1.  You may resume all your usual medications unless instructed otherwise by your doctor.    2.  To alleviate pain, you may take your over the counter or prescription pain medications as per the label instructions.    3.  Se Medication Reconciliation form for any new prescriptions.    4.  Do not drive, operate heavy machinery, or drink alcoholic beverages while taking narcotic pain medication.  Narcotic pain medication may cause constipation.   If no bowel movement in 48 hours, please contact your physician.        IN CASE OF EMERGENCY:  If you are unable to reach your doctor, call or go to the nearest emergency room.  The [facility] Emergency Department's phone number is [phone].      INSTRUCTIONS FOR PATIENT WITH GENERAL/IV SEDATION ONLY:  1.  Begin clear liquids and bland foods then progress to your normal diet if you are not nauseated.    2.  Nausea and vomiting occasionally occur after surgery. If you are nauseated, remain on clear liquids until it passes.  If nausea persists longer than 24 hours, please notify your doctor.      3.  Rest on the day of surgery.  You may increase activity as tolerated starting tomorrow.    4. Do not drive, operate hazardous machinery, or make important personal or legal decisions for 24 hours.    5.  You may feel dizzy or lightheaded from anesthesia.  Move cautiously for 24 hours.

## 2024-09-11 NOTE — CONSULTS
[facility]  Report of Consultation    Scar Cline Patient Status:  Emergency    1965 MRN [id number]   Location [facility] PRE OP RECOVERY Attending Eric Cline MD   Hosp Day # 0 PCP Derian Benjamin MD     Date of Admission:  2024  Date of Consult:  2024    Reason for Consultation:   1. Lumbar disc prolapse with compression radiculopathy          History of Present Illness:  Scar Cline is a a(n) 59 year old female with a history of hypertension, Hodgkin's disease, diabetes who presents with low back pain especially on the right radiating to the right thigh for the last 6 days. She has been training for marathon but denies any direct trauma or fall. No numbness or weakness to her extremities. No incontinence or constipation. No recent illness cough fevers or chills. No dysuria urgency or hematuria. She took a few tablets of a Medrol Dosepak but felt jittery so stopped them. She has been taking NSAID and muscle relaxants only with minimal relief thus she came to the ER for evaluation. No change in B and B or saddle anesthesia.    History:  Past Medical History:    Asplenia after surgical procedure    Breast CA (HCC)    right breast-bilat mastectomy    CMV hepatitis (HCC)    Congestive heart disease (HCC)    dx 2019 dec-resolved now    COVID-19    positive 3/2020 - hospitalized-has had negative nasal swabs , last week last one    Gastroparesis    High blood pressure    Hodgkin's disease (HCC)    3B-chemo    Insulin dependent type 2 diabetes mellitus (HCC)    Neuropathy    R arm, hand, legs    Pancreatic insufficiency (HCC)    Personal history of antineoplastic chemotherapy    Pneumococcal sepsis (HCC)    sepsis R/T pneumococcal bacteria     Serratia    Stress fracture of hip    hip pinning    Type 1 diabetes mellitus (HCC)    Type 1-C    Visual impairment    WEARS GLASSES/CONTACT     Past Surgical History:   Procedure Laterality Date     Appendectomy  1992    Chemotherapy  1984    Hodgkin;s disease-3B    Colonoscopy  03/21/2014    Colonoscopy N/A 10/19/2019    Procedure: COLONOSCOPY;  Surgeon: Eric De Jesus MD;  Location: [facility] ENDOSCOPY    Colonoscopy N/A 07/18/2020    Procedure: COLONOSCOPY;  Surgeon: Eric De Jesus MD;  Location: [facility] ENDOSCOPY    Fracture surgery Right 09/05/2013    Right femoral neck stress fracture percutaneous pinning    Hip surgery Left 2010    left, no pins    Implant left  12/28/2020    Implant right Bilateral 12/28/2020    Removal of bilateral silicone breast implants; Placement of permanent implants,  Autologous fat grafting from abdomen, flanks, and inner thighs    Rosanna localization wire 1 site right (cpt=19281)      1994    Mastectomy left      Mastectomy right  12/23/2019    Bilateral skin sparing mastectomy with right breast injection    Remove tissue expander(s)  08/06/2020    Removal of bilateral tissue expanders,  Placement of permanent silicone gel implant...    Remove tissue expander(s) Left 06/18/2020    Removal of left breast tissue expander. Evacuation and washout of left breast hematoma....    Remove tissue expander(s) Left 02/29/2020    Left breast tissue expander removal. Irrigation and washout, left breast. Complete capsulectomy, left breast    Splenectomy  1984     Family History   Problem Relation Age of Onset    Glaucoma Mother     Hypertension Mother     Heart Disease Father         CAD    Hypertension Father     Other (Other) Father         polymyalgia rheumatic    Breast Cancer Maternal Grandmother 80        post menopausal    Genetic Disease Other 5        Neurofibromatosis    Other (non Hodgkin's lymphoma follicular) Sister 53    Other (epilepsy) Son     No Known Problems Daughter     Cancer Self         hodgkins 1994    Diabetes Neg       reports that she has never smoked. She has never used smokeless tobacco. She reports that she does not drink alcohol and does not use  drugs.    Allergies:  Allergies   Allergen Reactions    Iodinated Diagnostic Agents [Radiology Contrast Iodinated Dyes] HIVES     Other reaction(s): IODINATED CONTRAST MEDIA - IV DYE    Penicillins OTHER (SEE COMMENTS)     MOUTH SORES    Scopolamine OTHER (SEE COMMENTS)     Very confused       Medications:  No current facility-administered medications for this encounter.  Scheduled Meds:  Continuous Infusions:  PRN Meds:.    Review of Systems:  Pertinent items are noted in HPI.    Physical Exam:  Blood pressure 141/82, pulse 51, temperature 98.9 °F (37.2 °C), temperature source Oral, resp. rate 16, height 5' 7\" (1.702 m), weight 141 lb (64 kg), SpO2 99%.  General: Alert and oriented x3, NAD, appears stated age, appropriate disposition and demeanor, answers questions appropriately appears to be comfortable in bed NAD  Head: normocephalic, atraumatic  Eyes: anicteric; no injection  Ears: no obvious deformities noted   Nose: externally grossly within normal limits, no unusual discharge or rhinorrhea   Throat: lips grossly within normal limits by visual exam externally  Neck: supple, trachea midline, no obvious JVD  Spine: tender right paraspinal@L4    Sensory / motor  grossly intact bilaterally =    SLR: + Right 45 degrees          Laboratory Data:  Lab Results   Component Value Date    WBC 17.9 09/11/2024    HGB 12.3 09/11/2024    HCT 37.4 09/11/2024    .0 09/11/2024    CREATSERUM 0.88 09/11/2024    BUN 14 09/11/2024     09/11/2024    K 3.9 09/11/2024     09/11/2024    CO2 25.0 09/11/2024     09/11/2024    CA 9.5 09/11/2024       Imaging:    PROCEDURE: MRI SPINE LUMBAR (CPT=72148)     COMPARISON: None.     INDICATIONS: eval for radicular symptoms     TECHNIQUE: A variety of imaging planes and parameters were utilized for visualization of suspected pathology.     FINDINGS:  NUMERATION: For the purposes of this examination, the lowest fully formed disc space is designated as L5-S1.  ALIGNMENT:  There is preservation of the expected lumbar lordosis.  BONES: No fracture or suspicious osseous lesion is evident.  CORD/CAUDA EQUINA: The distal cord and nerve roots have normal caliber, contour, and signal intensity.  PARASPINAL AREA: There is a retroaortic left renal vein.  OTHER: Postoperative changes of right proximal femoral internal fixation are noted on localizer sequence.  Probable left greater than right renal cysts.     LUMBAR DISC LEVELS:  L1-L2: There is a 1.2 cm left paracentral/subarticular zone disc protrusion/extrusion, which results in mild-to-moderate left lateral recess stenosis and slight effacement of the traversing left L2 nerve root.  No spinal canal or neural foraminal  stenosis.  L2-L3: Small left foraminal zone disc protrusion with probable associated annular fissure, which results in mild left neural foraminal stenosis but no other significant neural compromise.  L3-L4: Mild disc desiccation and degeneration with a tiny left foraminal zone disc protrusion/annular fissure.  Minor left neural foraminal stenosis with no spinal canal or right neural foraminal compromise.  L4-L5: There is an 8 mm right foraminal zone disc protrusion/extrusion, which results in moderate right neural foraminal stenosis and effacement of the exiting right L4 nerve root.  No spinal canal, left neural foraminal, or lateral recess compromise.  L5-S1: Disc and facet related degeneration, which does not result in significant neural compromise.               Impression   CONCLUSION:     1. L4-L5:  There is an 8 mm right foraminal zone disc protrusion/extrusion, which results in moderate right neural foraminal stenosis and effacement of the exiting right L4 nerve root.  Please correlate for right L4 radiculopathy.  2. L1-L2:  There is a 12 mm left paracentral/subarticular zone disc extrusion, which results in mild-to-moderate left lateral recess stenosis and slight effacement of the traversing left L2 nerve root.  3.  L2-L3 and L3-L4:  Mild left neural foraminal stenosis, which is related to small left foraminal zone disc protrusions at both levels.  4. L5-S1:  Degenerative changes, which do not result in significant neural compromise.     5. There is a retroaortic left renal vein.     elm-remote     Dictated by (CST): Dave Littlejohn MD on 9/11/2024 at 3:13 PM      Finalized by (CST): Dave Littlejohn MD on 9/11/2024 at 3:19 PM                 Impression:  Patient Active Problem List   Diagnosis    Controlled type 2 diabetes mellitus without complication, without long-term current use of insulin (HCC)    Myopia    Viral warts    Chest pain, atypical    Hypokalemia    Metabolic alkalosis    Dyspnea on exertion    Perioral cyanosis    Acute chest pain    Elevated immune protein in blood    Muscular pain    Chronic bilateral thoracic back pain    Neutrophilic leukocytosis    Thrombocytosis    Abnormal mammogram of right breast    Malignant neoplasm of upper-outer quadrant of right breast in female, estrogen receptor positive (HCC)    Essential hypertension    Insulin dependent type 2 diabetes mellitus (HCC)    Absence of breast, bilateral    Absence of both breasts    Breast abscess    Dehydration    Anorexia    COVID-19    Bleeding from wound    Breast hematoma    Hyponatremia    Constipation, unspecified constipation type    Abdominal pain, acute    Cervicalgia    Myofascial pain    Snoring    Insomnia    Hyperglycemia    Generalized anxiety disorder    Lumbar disc prolapse with compression radiculopathy           Recommendations:    Right L4/5 Transforaminal Epidural steroid injection with xray      I have informed Scar Cline  of the risks of neuraxial injection including, but not limited to: failure, headache, backache, spinal, unilateral/patchy block, difficulty breathing, infection, bleeding, nerve damage, paralysis, death. The patient desires the proposed neuraxial injection as planned.    Comprehensive  analgesic plan was formulated. Conservative vs. Aggressive measures were discussed at length including pharmacotherapy (eg. Anti- inflammatories, muscle relaxants, neuropathic medications, oral steroids, analgesics), injections, and further testing. Risks and benefits of all options were discussed at length to patients satisfaction during a comprehensive interactive discussion. All questions were answered during extended questions and answer session. Patient agreeable to discussion plan. Greater than 50% of the time was spent with counseling (nature of discussion centered around pain, therapy, and treatment options), face to face time, time spent reviewing data, obtaining patient information and discussing the care with the patients health care providers.     Thank you for allowing me to participate in the care of your patient.    Total time: 30mins    ZULLY RAMACHANDRAN MD  9/11/2024  [facility] [phone]

## 2024-09-11 NOTE — ED PROVIDER NOTES
Patient Seen in: [facility] Emergency Department      History     Chief Complaint   Patient presents with    Back Pain     Stated Complaint: Back Pain    Subjective:   HPI    The patient is a 59-year-old female with a history of hypertension, Hodgkin's disease, diabetes who presents with low back pain especially on the right radiating to the right thigh for the last 6 days.  She has been training for marathon but denies any direct trauma or fall.  No numbness or weakness to her extremities.  No incontinence or constipation.  No recent illness cough fevers or chills.  No dysuria urgency or hematuria.  She took a few tablets of a Medrol Dosepak but felt jittery so stopped them.  She has been taking muscle relaxants only with minimal relief    Objective:   Past Medical History:    Asplenia after surgical procedure    Breast CA (HCC)    right breast-bilat mastectomy    CMV hepatitis (HCC)    Congestive heart disease (HCC)    dx 2019 dec-resolved now    COVID-19    positive 3/2020 - hospitalized-has had negative nasal swabs , last week last one    Gastroparesis    High blood pressure    Hodgkin's disease (HCC)    3B-chemo    Insulin dependent type 2 diabetes mellitus (HCC)    Neuropathy    R arm, hand, legs    Pancreatic insufficiency (HCC)    Personal history of antineoplastic chemotherapy    Pneumococcal sepsis (HCC)    sepsis R/T pneumococcal bacteria     Serratia    Stress fracture of hip    hip pinning    Type 1 diabetes mellitus (HCC)    Type 1-C    Visual impairment    WEARS GLASSES/CONTACT              No pertinent past surgical history.              No pertinent social history.            Review of Systems    Positive for stated Chief Complaint: Back Pain    Other systems are as noted in HPI.  Constitutional and vital signs reviewed.      All other systems reviewed and negative except as noted above.    Physical Exam     ED Triage Vitals [09/11/24 1245]   /87   Pulse 78   Resp 18   Temp 99.3 °F  (37.4 °C)   Temp src Oral   SpO2 98 %   O2 Device None (Room air)       Current Vitals:   Vital Signs  BP: 121/78  Pulse: 62  Resp: 18  Temp: 99.3 °F (37.4 °C)  Temp src: Oral  MAP (mmHg): 92    Oxygen Therapy  SpO2: 98 %  O2 Device: None (Room air)            Physical Exam  Vitals and nursing note reviewed.   Constitutional:       General: She is not in acute distress.     Appearance: Normal appearance. She is well-developed. She is not ill-appearing.   HENT:      Head: Normocephalic and atraumatic.      Mouth/Throat:      Mouth: Mucous membranes are moist.   Eyes:      Conjunctiva/sclera: Conjunctivae normal.      Pupils: Pupils are equal, round, and reactive to light.   Neck:      Vascular: No JVD.   Cardiovascular:      Rate and Rhythm: Normal rate and regular rhythm.      Heart sounds: Normal heart sounds. No murmur heard.  Pulmonary:      Effort: Pulmonary effort is normal.      Breath sounds: Normal breath sounds.   Abdominal:      General: Bowel sounds are normal. There is no distension.      Palpations: Abdomen is soft. There is no mass.      Tenderness: There is no abdominal tenderness. There is no right CVA tenderness, left CVA tenderness, guarding or rebound.   Musculoskeletal:      Cervical back: Normal range of motion and neck supple.      Lumbar back: Tenderness (Right lumbar paraspinal) present. No bony tenderness. Decreased range of motion. Positive right straight leg raise test.      Right lower leg: No edema.      Left lower leg: No edema.   Skin:     General: Skin is warm and dry.      Capillary Refill: Capillary refill takes less than 2 seconds.      Findings: No rash.   Neurological:      General: No focal deficit present.      Mental Status: She is alert and oriented to person, place, and time.      Deep Tendon Reflexes: Reflexes are normal and symmetric.   Psychiatric:         Judgment: Judgment normal.         Differential diagnosis includes disc disease, muscle strain, compression  fracture    ED Course     Labs Reviewed   BASIC METABOLIC PANEL (8) - Abnormal; Notable for the following components:       Result Value    Glucose 113 (*)     Sodium 134 (*)     All other components within normal limits   CBC WITH DIFFERENTIAL WITH PLATELET - Abnormal; Notable for the following components:    WBC 17.9 (*)     RDW-SD 53.8 (*)     RDW 17.0 (*)     .0 (*)     Neutrophil Absolute Prelim 10.08 (*)     Neutrophil Absolute 10.08 (*)     Lymphocyte Absolute 5.07 (*)     Monocyte Absolute 2.19 (*)     All other components within normal limits   SCAN SLIDE   MD BLOOD SMEAR CONSULT                      MDM                                         Medical Decision Making  Patient with L4 bulging disc compressing the right L4 nerve root  Pain improved with morphine  Spoke with anesthesia who will take her to the OR for a nerve block  Robaxin intact prior to discharge to the OR    Problems Addressed:  Lumbar disc prolapse with compression radiculopathy: acute illness or injury    Amount and/or Complexity of Data Reviewed  Radiology: ordered and independent interpretation performed.     Details: No compression fracture or other results per radiologist    Risk  Prescription drug management.  Parenteral controlled substances.        Disposition and Plan     Clinical Impression:  1. Lumbar disc prolapse with compression radiculopathy         Disposition:  Discharge  9/11/2024  3:51 pm    Follow-up:  Gerard Elmore MD  [address] [place] [address]  [address]  [place] [id number] [phone]    Schedule an appointment as soon as possible for a visit            Medications Prescribed:  Current Discharge Medication List        START taking these medications    Details   oxyCODONE-acetaminophen 5-325 MG Oral Tab Take 1-2 tablets by mouth every 6 (six) hours as needed for Pain.  Qty: 20 tablet, Refills: 0    Associated Diagnoses: Lumbar disc prolapse with compression radiculopathy

## 2024-09-11 NOTE — ED QUICK NOTES
Patient leaving ER to go to pre op for procedure.   Patient and family aware and verbalize understanding. ER DC instructions reviewed with patient and fmialy.   Report given to pre op RN.   Pre op at bedside to transport patient from ER at this time.

## 2024-09-11 NOTE — ED INITIAL ASSESSMENT (HPI)
Pt presents to the ED with c/o worsening lower right back back since last Friday. +radiates to right lower thigh. Pt reports bending over when the pain started. Pt currently taking a muscle relaxer and getting no pain relief.

## 2024-09-17 ENCOUNTER — TELEPHONE (OUTPATIENT)
Dept: SURGERY | Facility: CLINIC | Age: 59
End: 2024-09-17

## 2024-09-17 ENCOUNTER — TELEPHONE (OUTPATIENT)
Dept: PAIN CLINIC | Facility: HOSPITAL | Age: 59
End: 2024-09-17

## 2024-09-17 DIAGNOSIS — M51.16 LUMBAR DISC HERNIATION WITH RADICULOPATHY: Primary | ICD-10-CM

## 2024-09-17 DIAGNOSIS — M51.16 LUMBAR DISC PROLAPSE WITH COMPRESSION RADICULOPATHY: ICD-10-CM

## 2024-09-17 DIAGNOSIS — M54.16 LUMBAR RADICULOPATHY: ICD-10-CM

## 2024-09-17 DIAGNOSIS — M54.16 ACUTE RIGHT LUMBAR RADICULOPATHY: Primary | ICD-10-CM

## 2024-09-17 DIAGNOSIS — M54.17 LUMBOSACRAL RADICULOPATHY AT L4: ICD-10-CM

## 2024-09-17 RX ORDER — OXYCODONE AND ACETAMINOPHEN 5; 325 MG/1; MG/1
1 TABLET ORAL
Qty: 20 TABLET | Refills: 0 | Status: SHIPPED | OUTPATIENT
Start: 2024-09-17

## 2024-09-17 RX ORDER — DULOXETIN HYDROCHLORIDE 30 MG/1
30 CAPSULE, DELAYED RELEASE ORAL DAILY
Qty: 30 CAPSULE | Refills: 1 | Status: SHIPPED | OUTPATIENT
Start: 2024-09-17 | End: 2024-11-16

## 2024-09-18 ENCOUNTER — OFFICE VISIT (OUTPATIENT)
Dept: PAIN CLINIC | Facility: HOSPITAL | Age: 59
End: 2024-09-18
Attending: STUDENT IN AN ORGANIZED HEALTH CARE EDUCATION/TRAINING PROGRAM
Payer: COMMERCIAL

## 2024-09-18 ENCOUNTER — TELEPHONE (OUTPATIENT)
Dept: PAIN CLINIC | Facility: HOSPITAL | Age: 59
End: 2024-09-18

## 2024-09-18 VITALS
RESPIRATION RATE: 18 BRPM | OXYGEN SATURATION: 97 % | SYSTOLIC BLOOD PRESSURE: 147 MMHG | DIASTOLIC BLOOD PRESSURE: 95 MMHG | WEIGHT: 141 LBS | HEIGHT: 68 IN | HEART RATE: 63 BPM | BODY MASS INDEX: 21.37 KG/M2

## 2024-09-18 DIAGNOSIS — M54.16 LUMBAR RADICULOPATHY: Primary | ICD-10-CM

## 2024-09-18 PROCEDURE — 99211 OFF/OP EST MAY X REQ PHY/QHP: CPT

## 2024-09-18 NOTE — PATIENT INSTRUCTIONS
Refill policies:    Allow 2-3 business days for refills; controlled substances may take longer.  Contact your pharmacy at least 5 days prior to running out of medication and have them send an electronic request or submit request through the “request refill” option in your [employer] account.  Refills are not addressed on weekends; covering physicians do not authorize routine medications on weekends.  No narcotics or controlled substances are refilled after noon on Fridays or by on call physicians.  By law, narcotics must be electronically prescribed.  A 30 day supply with no refills is the maximum allowed.  If your prescription is due for a refill, you may be due for a follow up appointment.  To best provide you care, patients receiving routine medications need to be seen at least once a year.  Patients receiving narcotic/controlled substance medications need to be seen at least once every 3 months.  In the event that your preferred pharmacy does not have the requested medication in stock (e.g. Backordered), it is your responsibility to find another pharmacy that has the requested medication available.  We will gladly send a new prescription to that pharmacy at your request.    Scheduling Tests:    If your physician has ordered radiology tests such as MRI or CT scans, please contact Central Scheduling at [phone] right away to schedule the test.  Once scheduled, the [facility] Centralized Referral Team will work with your insurance carrier to obtain pre-certification or prior authorization.  Depending on your insurance carrier, approval may take 3-10 days.  It is highly recommended patients assure they have received an authorization before having a test performed.  If test is done without insurance authorization, patient may be responsible for the entire amount billed.      Precertification and Prior Authorizations:  If your physician has recommended that you have a procedure or additional testing performed the [facility]  Centralized Referral Team will contact your insurance carrier to obtain pre-certification or prior authorization.    You are strongly encouraged to contact your insurance carrier to verify that your procedure/test has been approved and is a COVERED benefit.  Although the [facility] Centralized Referral Team does its due diligence, the insurance carrier gives the disclaimer that \"Although the procedure is authorized, this does not guarantee payment.\"    Ultimately the patient is responsible for payment.   Thank you for your understanding in this matter.  Paperwork Completion:  If you require FMLA or disability paperwork for your recovery, please make sure to either drop it off or have it faxed to our office at [phone]. Be sure the form has your name and date of birth on it.  The form will be faxed to our Forms Department and they will complete it for you.  There is a 25$ fee for all forms that need to be filled out.  Please be aware there is a 10-14 day turnaround time.  You will need to sign a release of information (NATHAN) form if your paperwork does not come with one.  You may call the Forms Department with any questions at [phone].  Their fax number is [phone].

## 2024-09-18 NOTE — CHRONIC PAIN
Interventional Pain Medicine  Follow up visit         Chief Complaint: Right leg pain     History of Present Illness:  Scar Cline is a 59 year old female presents for right leg pain. Patient presented to ED with acute worsening right leg pain radiating from the back that failed to respond to OTC medications. She underwent a JUAN which helped alleviate pain >50% but only lasted 3-4 days. Patient has PMH of lymphoma and breast cancer in remission.     Location: Right leg  Severity: 7-9/10  Descriptors: nerve pain  Aggravating Factors: activity  Reliving Factors: none  Associated Symptoms: parasthesia, tingling     Functional Goals of Treatment: pain relief     Current Medication:   No outpatient medications have been marked as taking for the 9/18/24 encounter (Office Visit) with Neal Hancock MD.         Other Medical History  Past Medical History:    Asplenia after surgical procedure    Breast CA (HCC)    right breast-bilat mastectomy    CMV hepatitis (HCC)    Congestive heart disease (HCC)    dx 2019 dec-resolved now    COVID-19    positive 3/2020 - hospitalized-has had negative nasal swabs , last week last one    Gastroparesis    High blood pressure    Hodgkin's disease (HCC)    3B-chemo    Insulin dependent type 2 diabetes mellitus (HCC)    Neuropathy    R arm, hand, legs    Pancreatic insufficiency (HCC)    Personal history of antineoplastic chemotherapy    Pneumococcal sepsis (HCC)    sepsis R/T pneumococcal bacteria     Serratia    Stress fracture of hip    hip pinning    Type 1 diabetes mellitus (HCC)    Type 1-C    Visual impairment    WEARS GLASSES/CONTACT       Review of Systems:  CONSTITUTIONAL: denies fevers, chills  HEENT: denies swallowing difficulties, sore throat  CARDIOVASCULAR: denies chest pain, palpitations, syncope  RESPIRATORY: denies shortness of breath, cough, wheezing  GI: denies change in bowel habits, nausea, vomiting  : denies change in bladder function, frequency,  dysuria  SKIN: denies rash, skin changes  MSK: Per HPI  NEURO: Per HPI  PSYCH: Per HPI      General Physical Exam:    Constitutional  Oriented to person, place, and time. Appears well-developed and   well-nourished  Head    Normocephalic and atraumatic.  Eyes    Pupils are equal, round, and reactive to light.  Neck    Neck supple  Cardiovascular  Minimal to no peripheral edema, intact distal pulses  Pulmonary/Chest  Effort normal, no shortness of breath noted  Neurological   Alert and oriented to person, place, and time  Skin    Skin is warm and dry  Psychiatric   Normal mood and affect, behavior and judgment    Neurologic & Musculoskeletal Exam    Lumbar    Region Exam Right (+/-) Left  (+/-)   Lumbar Musculature Tender w/ palpation - -   Lumbar Facet Pain w/ extension - -         Lower Extremity SLR +++ -   Lower Extremity Crossed SLR - -       Sensation Right Left   L2: Proximal Anterior Thigh Normal Normal   L3: Mid Anterior Thigh Normal Normal   L4: Medial leg/foot, great toe (Saphenous n.) Normal Normal   L5: Dorsum of mid foot Normal Normal   S1: Lateral leg/foot, little toe, back of leg (Sural n.) Normal Normal       Motor Strength Right Left   L2:  5/5 5/5   L3:  5/5 5/5   L4:  5/5 5/5   L5:  5/5 5/5   S1:  5/5 5/5       Reflexes Right Left   L4: Patellar 2/4 2/4   S1: Achilles 2/4 2/4   Babinski Absent Absent   Clonus Absent Absent       Imaging:  Images of the following studies have been personally reviewed and my independent interpretation reveals as written:    MRI Lumbar spine from 9/11/24  shows Mild multilevel degenerative changes most pronounced at L4/5 with small right far lateral disc bulge touching on the exiting L4 nerve root. There is also a left L1/2 disc herniation likely asymptomatic. There is a degree of facet arthropathy most pronounced at the right L3/4, L4/5 and L5/S1 levels     Assessment & Plan:  Scar Cline is a 59 year old female with right leg pain     #Lumbar Radicular  Syndrome, acute worsening   -Partial short lived relief with L4 TFESI   -Recommend proceeding with TFESI at L3/4 and L4/5   -Will refer to PT   -Recommend starting neurpathic pain medication, patient had side effects from Lyrica in the past. Could consider gbapentin or Duloxetine. Patient will think about it and call us if she decides she wants to start a medication.   -Follow up 2-4 weeks post-procedure    Comprehensive analgesic plan was formulated. Conservative vs. Aggressive measures were discussed at length including pharmacotherapy (eg. Anti- inflammatories, muscle relaxants, neuropathic medications, oral steroids, analgesics), injections, and further testing. Risks and benefits of all options were discussed at length to patients satisfaction during a comprehensive interactive discussion. All questions were answered during extended questions and answer session. Patient agreeable to discussion plan. Greater than 50% of the time was spent with counseling (nature of discussion centered around pain, therapy, and treatment options), face to face time, time spent reviewing data, obtaining patient information and discussing the care with the patients health care providers.     Time spent: 30    Neal Hancock MD, 09/18/24, 11:37 AM,e

## 2024-09-18 NOTE — PROGRESS NOTES
Patient presents in office today with reported pain in the back pain radiating to the R thigh    Current pain level reported = 7/10    Last reported dose of percocet (pt states she took two pills)      Narcotic Contract renewal N/A     Urine Drug screen N/A

## 2024-09-18 NOTE — TELEPHONE ENCOUNTER
[address] [place] [address]  [phone]      Medical Progress Note      NAME: Kary Paris   :  10/31/1924  MRM:  [id number]    Date/Time: 2018  6:58 AM         Subjective:     Confused and agitated last PM and given IV ativan with improvement. Afebrile. O2 sat's in high 90's on O2 at 2 LPM. Lactic acid 1.4. WBC 13.6. Not seen by SLT yesterday and remains NPO. BS's better and currently on SSI. Past Medical History:   Diagnosis Date    CAD (coronary artery disease)     Chronic kidney disease (CKD), stage III (moderate)     GFR ~ 37 at baseline    Dementia     Diabetes (HCC)     Type 2 with renal manifestations    Gout     Hyperlipidemia     Hypertension     Hypothyroidism     Other ill-defined conditions(799.89)     sleep apnea    Other ill-defined conditions(799.89)     Cardiomyopathy    Syncope     Low blood sugar       ROS:  Patient was not able to provide review of systems due to dementia         Objective:       Vitals:        Last 24hrs VS reviewed since prior progress note. Most recent are:    Visit Vitals    /50    Pulse 70    Temp 98.1 °F (36.7 °C)    Resp 18    Ht 5' 1\" (1.549 m)    Wt 136 lb 6 oz (61.9 kg)    SpO2 95%    BMI 25.77 kg/m2     SpO2 Readings from Last 6 Encounters:   18 95%   17 95%   17 98%   16 91%   16 96%   09/02/15 98%    O2 Flow Rate (L/min): 2 l/min       Intake/Output Summary (Last 24 hours) at 18 0658  Last data filed at 18 0322   Gross per 24 hour   Intake          1305.01 ml   Output              850 ml   Net           455.01 ml        Telemetry reviewed:   normal sinus rhythm  Tubes:   N/A  X-Ray:  Admission chest xray - New right basilar airspace disease may represent pneumonia or aspiration  Procedures:   N/A    Exam:     General   Obese WF, in NAD  Neck   Supple without nodes or mass.  No thyromegaly or bruit  Respiratory   Rales right base without Prior Authorization for Right L3/4,L4/5 [facility]LESI  [facility]  CPT codes: 60700,34402  Request for injection pending review, pending order #[id number]. Clinical notes faxed to [facility] at Fax #: [phone], confirmation received.    wheezing  Cardiology  Regular Rate and Rythmn  - no murmurs, rubs or gallops  Abdominal  Soft, non-tender, non-distended, positive bowel sounds, no hepatosplenomegaly  Extremities  No clubbing, cyanosis, or edema. Pulses intact. Skin  Normal skin turgor.   No rashes or skin ulcers noted  Neurological  No focal neurological deficits noted  Psychological  Oriented to name  Exam otherwise negative     Lab Data Reviewed: (see below)    Medications Reviewed: (see below)    ______________________________________________________________________    Medications:     Current Facility-Administered Medications   Medication Dose Route Frequency    dextrose 5% and 0.9% NaCl infusion  50 mL/hr IntraVENous CONTINUOUS    LORazepam (ATIVAN) injection 0.5 mg  0.5 mg IntraVENous QHS PRN    glucose chewable tablet 16 g  4 Tab Oral PRN    dextrose (D50W) injection syrg 12.5-25 g  12.5-25 g IntraVENous PRN    glucagon (GLUCAGEN) injection 1 mg  1 mg IntraMUSCular PRN    insulin lispro (HUMALOG) injection   SubCUTAneous AC&HS    piperacillin-tazobactam (ZOSYN) 3.375 g in  ml premix/cmpd  3.375 g IntraVENous Q12H    albuterol-ipratropium (DUO-NEB) 2.5 MG-0.5 MG/3 ML  3 mL Nebulization BID RT    sodium chloride (NS) flush 5-10 mL  5-10 mL IntraVENous Q8H    acetaminophen (TYLENOL) tablet 650 mg  650 mg Oral Q6H PRN    heparin (porcine) injection 5,000 Units  5,000 Units SubCUTAneous Q8H    allopurinol (ZYLOPRIM) tablet 100 mg  100 mg Oral DAILY    amLODIPine (NORVASC) tablet 5 mg  5 mg Oral DAILY    atorvastatin (LIPITOR) tablet 40 mg  40 mg Oral DAILY    clopidogrel (PLAVIX) tablet 75 mg  75 mg Oral DAILY    levothyroxine (SYNTHROID) tablet 88 mcg  88 mcg Oral ACB    famotidine (PEPCID) tablet 20 mg  20 mg Oral DAILY    sodium chloride (NS) flush 5-10 mL  5-10 mL IntraVENous PRN    metoprolol (LOPRESSOR) injection 5 mg  5 mg IntraVENous Q4H            Lab Review:     Recent Labs      01/01/18   0240  12/31/17   0200 12/30/17   0142   WBC  13.6*  15.8*  12.6*   HGB  10.0*  10.8*  10.9*   HCT  31.8*  33.8*  34.1*   PLT  275  262  270     Recent Labs      12/31/17   0209  12/29/17   2040  12/29/17   1816   NA  144  140  138   K  4.0  4.1  4.3   CL  108  103  101   CO2  27  27  27   GLU  142*  233*  243*   BUN  45*  42*  41*   CREA  2.10*  2.29*  2.32*   CA  8.2*  8.1*  8.6   MG   --   2.4   --    ALB   --    --   3.1*   TBILI   --    --   1.0   SGOT   --    --   22   ALT   --    --   17     Lab Results   Component Value Date/Time    Glucose (POC) 169 12/31/2017 08:43 PM    Glucose (POC) 158 12/31/2017 05:19 PM    Glucose (POC) 197 12/31/2017 10:17 AM    Glucose (POC) 105 12/30/2017 08:20 PM    Glucose (POC) 179 12/30/2017 11:55 AM     No results for input(s): PH, PCO2, PO2, HCO3, FIO2 in the last 72 hours. No results for input(s): INR in the last 72 hours. No lab exists for component: INREXT, INREXT         Assessment:     Principal Problem:    [facility] ([facility] (12/29/2017)    Active Problems:    DM (diabetes mellitus), type 2, uncontrolled, with renal complications ([facility] (5/12/0992)      CKD (chronic kidney disease) stage 3, GFR 30-59 ml/min (8/23/2010)      Aspiration pneumonia ([facility] (12/30/2017)      Lactic acidosis (12/30/2017)      Dementia (12/30/2017)           Plan:     Risk of deterioration: medium             1. Continue slow IV hydration   2. Continue zosyn  3. Jet nebs and O2 as needed  4. Recheck chest xray  5. Await cultures  6. NPO until SLT evaluation   7. Continue SSI  8.  DNR noted     Care Plan discussed with: Nursing    Discussed:  Care Plan    Prophylaxis:  Hep SQ    Disposition:  Home w/Family           ___________________________________________________    Attending Physician: Andres Baeza MD

## 2024-09-23 NOTE — TELEPHONE ENCOUNTER
Procedure Name: Right L3/4,L4/5 TLESI  CPT Code(s): 34498,69190    The procedure has been DENIED.    Plan/3rd party: [facility]  Case/Reference #: [id number]  P2P Phone #: [phone]  Number of days to complete P2P: n/a    Reason for denial: Your doctor wants to give you an injection into your lower back. This is to help with the pain or tingling going down your leg. Your doctor told us this is coming from an inflamed nerve in your lower back. Your doctor also told us that you had this injection in the past. This injection is needed again when certain criteria are met. The last injection should have decreased your pain by half for at least three months. You should have been able to perform your normal activities again. You should have felt better for at least three months. You should have continued to get treatment since your last injection. You need at least two types of treatment. This could include medications or therapy that does not involve medicine. It must include special exercises that help make muscles stronger. This is called physical therapy. We also need to see the results of special pictures of your back (done in the last 18 months). These special pictures may include an MRI or CT. The pictures should match your symptoms and physical exam. Your doctor should tell us that you could not do some of your normal activities because of your current symptoms. We reviewed the notes we received. The notes do not show that you meet all the required criteria. As a result, this injection is not medically necessary. We used [facility] Medical Benefits Management Clinical Guideline titled Interventional Pain Management, Epidural Injection Procedures and Diagnostic Selective Nerve Root Blocks to make this decision.     Routed to Nursing [place].

## 2024-10-24 ENCOUNTER — LAB ENCOUNTER (OUTPATIENT)
Dept: LAB | Facility: HOSPITAL | Age: 59
End: 2024-10-24
Attending: INTERNAL MEDICINE
Payer: COMMERCIAL

## 2024-10-24 DIAGNOSIS — E78.5 HYPERLIPIDEMIA: ICD-10-CM

## 2024-10-24 DIAGNOSIS — E10.9 DIABETES MELLITUS TYPE I (HCC): Primary | ICD-10-CM

## 2024-10-24 LAB
ALBUMIN SERPL-MCNC: 5.1 G/DL (ref 3.2–4.8)
ALBUMIN/GLOB SERPL: 1.8 {RATIO} (ref 1–2)
ALP LIVER SERPL-CCNC: 110 U/L
ALT SERPL-CCNC: 24 U/L
ANION GAP SERPL CALC-SCNC: 7 MMOL/L (ref 0–18)
AST SERPL-CCNC: 24 U/L (ref ?–34)
BILIRUB SERPL-MCNC: 0.4 MG/DL (ref 0.3–1.2)
BUN BLD-MCNC: 9 MG/DL (ref 9–23)
BUN/CREAT SERPL: 10.6 (ref 10–20)
CALCIUM BLD-MCNC: 10 MG/DL (ref 8.7–10.4)
CHLORIDE SERPL-SCNC: 104 MMOL/L (ref 98–112)
CHOLEST SERPL-MCNC: 287 MG/DL (ref ?–200)
CO2 SERPL-SCNC: 27 MMOL/L (ref 21–32)
CREAT BLD-MCNC: 0.85 MG/DL
CREAT UR-SCNC: 41 MG/DL
EGFRCR SERPLBLD CKD-EPI 2021: 79 ML/MIN/1.73M2 (ref 60–?)
EST. AVERAGE GLUCOSE BLD GHB EST-MCNC: 128 MG/DL (ref 68–126)
FASTING PATIENT LIPID ANSWER: YES
FASTING STATUS PATIENT QL REPORTED: YES
GLOBULIN PLAS-MCNC: 2.9 G/DL (ref 2–3.5)
GLUCOSE BLD-MCNC: 102 MG/DL (ref 70–99)
HBA1C MFR BLD: 6.1 % (ref ?–5.7)
HDLC SERPL-MCNC: 103 MG/DL (ref 40–59)
LDLC SERPL CALC-MCNC: 169 MG/DL (ref ?–100)
MICROALBUMIN UR-MCNC: 0.4 MG/DL
MICROALBUMIN/CREAT 24H UR-RTO: 9.8 UG/MG (ref ?–30)
NONHDLC SERPL-MCNC: 184 MG/DL (ref ?–130)
OSMOLALITY SERPL CALC.SUM OF ELEC: 285 MOSM/KG (ref 275–295)
POTASSIUM SERPL-SCNC: 4.4 MMOL/L (ref 3.5–5.1)
PROT SERPL-MCNC: 8 G/DL (ref 5.7–8.2)
SODIUM SERPL-SCNC: 138 MMOL/L (ref 136–145)
TRIGL SERPL-MCNC: 95 MG/DL (ref 30–149)
VLDLC SERPL CALC-MCNC: 19 MG/DL (ref 0–30)

## 2024-10-24 PROCEDURE — 80061 LIPID PANEL: CPT

## 2024-10-24 PROCEDURE — 82043 UR ALBUMIN QUANTITATIVE: CPT

## 2024-10-24 PROCEDURE — 80053 COMPREHEN METABOLIC PANEL: CPT

## 2024-10-24 PROCEDURE — 83036 HEMOGLOBIN GLYCOSYLATED A1C: CPT

## 2024-10-24 PROCEDURE — 82570 ASSAY OF URINE CREATININE: CPT

## 2024-10-24 PROCEDURE — 36415 COLL VENOUS BLD VENIPUNCTURE: CPT

## 2025-05-31 NOTE — CM/SW NOTE
3/4/2020    1100: SW self-referred to patient chart for discharge planning. Per nursing rounds, pt recently discharged to home and was set up with outpatient IV antibiotic infusion.      Per chart review, pt dc'd on 3/2 with spouse to home and [facility] [facility]
[employer]

## (undated) DIAGNOSIS — M54.16 BILATERAL LUMBAR RADICULOPATHY: Primary | ICD-10-CM

## (undated) DEVICE — TOWEL OR BLU 16X26 STRL

## (undated) DEVICE — STANDARD HYPODERMIC NEEDLE,POLYPROPYLENE HUB: Brand: MONOJECT

## (undated) DEVICE — DRESSING BIOPATCH 1X4 CNTR

## (undated) DEVICE — SOL  .9 1000ML BTL

## (undated) DEVICE — LIGACLIP MCA MULTIPLE CLIP APPLIERS, 20 MEDIUM CLIPS: Brand: LIGACLIP

## (undated) DEVICE — SNARE CAPTIFLEX MICRO-OVL OLY

## (undated) DEVICE — 3M™ IOBAN™ 2 ANTIMICROBIAL INCISE DRAPE 6648EZ: Brand: IOBAN™ 2

## (undated) DEVICE — CONMED SCOPE SAVER BITE BLOCK, 20X27 MM: Brand: SCOPE SAVER

## (undated) DEVICE — 60 ML SYRINGE LUER-LOCK TIP: Brand: MONOJECT

## (undated) DEVICE — SUCTION CANISTER, 3000CC,SAFELINER: Brand: DEROYAL

## (undated) DEVICE — SUPER SPONGES,MEDIUM: Brand: KERLIX

## (undated) DEVICE — FORCEP RADIAL JAW 4

## (undated) DEVICE — Device: Brand: CUSTOM PROCEDURE KIT

## (undated) DEVICE — APPLICATOR COTTON TIP 3 10/PK

## (undated) DEVICE — 3M™ IOBAN™ 2 ANTIMICROBIAL INCISE DRAPE 6650EZ: Brand: IOBAN™ 2

## (undated) DEVICE — MODERATE PLUS PROFILE XTRA, M+X 370CC GEL SIZER: Brand: MENTOR MEMORYGEL XTRA RESTERILIZABLE GEL SIZER

## (undated) DEVICE — SYRINGE 10ML LL CONTRL SYRINGE

## (undated) DEVICE — DRAPE SHEET LG

## (undated) DEVICE — DERMABOND LIQUID ADHESIVE

## (undated) DEVICE — BRA SURG ELIZ PINK L

## (undated) DEVICE — PROXIMATE RH ROTATING HEAD SKIN STAPLERS (35 WIDE) CONTAINS 35 STAINLESS STEEL STAPLES: Brand: PROXIMATE

## (undated) DEVICE — OCCLUSIVE GAUZE STRIP,3% BISMUTH TRIBROMOPHENATE IN PETROLATUM BLEND: Brand: XEROFORM

## (undated) DEVICE — SUTURE MONOCRYL 4-0 PS-2

## (undated) DEVICE — FLEXIBLE YANKAUER,MEDIUM TIP, NO VACUUM CONTROL: Brand: ARGYLE

## (undated) DEVICE — 1010 S-DRAPE TOWEL DRAPE 10/BX: Brand: STERI-DRAPE™

## (undated) DEVICE — 3M™ TEGADERM™ TRANSPARENT FILM DRESSING, 1626W, 4 IN X 4-3/4 IN (10 CM X 12 CM), 50 EACH/CARTON, 4 CARTON/CASE: Brand: 3M™ TEGADERM™

## (undated) DEVICE — LINE MNTR ADLT SET O2 INTMD

## (undated) DEVICE — BANDAGE ROLL,100% COTTON, 6 PLY, LARGE: Brand: KERLIX

## (undated) DEVICE — 40580 - THE PINK PAD - ADVANCED TRENDELENBURG POSITIONING KIT: Brand: 40580 - THE PINK PAD - ADVANCED TRENDELENBURG POSITIONING KIT

## (undated) DEVICE — Device

## (undated) DEVICE — STERILE TETRA-FLEX CF, ELASTIC BANDAGE LATEX FREE 6IN X5.5 YD: Brand: TETRA-FLEX™CF

## (undated) DEVICE — INTENDED FOR TISSUE SEPARATION, AND OTHER PROCEDURES THAT REQUIRE A SHARP SURGICAL BLADE TO PUNCTURE OR CUT.: Brand: BARD-PARKER ® STAINLESS STEEL BLADES

## (undated) DEVICE — Device: Brand: MEDEX

## (undated) DEVICE — OCCLUSIVE GAUZE STRIP OVERWRAP,3% BISMUTH TRIBROMOPHENATE IN PETROLATUM BLEND: Brand: XEROFORM

## (undated) DEVICE — 3M™ STERI-STRIP™ REINFORCED ADHESIVE SKIN CLOSURES, R1547, 1/2 IN X 4 IN (12 MM X 100 MM), 6 STRIPS/ENVELOPE: Brand: 3M™ STERI-STRIP™

## (undated) DEVICE — SUTURE VICRYL 4-0 RB-1

## (undated) DEVICE — TIP BOVIE 4\" MEGADYNE

## (undated) DEVICE — BLAKE SILICONE DRAIN, 15 FR ROUND, HUBLESS WITH 3/16" TROCAR: Brand: BLAKE

## (undated) DEVICE — PROXIMATE SKIN STAPLERS (35 WIDE) CONTAINS 35 STAINLESS STEEL STAPLES (FIXED HEAD): Brand: PROXIMATE

## (undated) DEVICE — 3 ML SYRINGE LUER-LOCK TIP: Brand: MONOJECT

## (undated) DEVICE — SUTURE VICRYL 0 J340H

## (undated) DEVICE — MAJOR GENERAL: Brand: MEDLINE INDUSTRIES, INC.

## (undated) DEVICE — ENCORE® LATEX MICRO SIZE 7, STERILE LATEX POWDER-FREE SURGICAL GLOVE: Brand: ENCORE

## (undated) DEVICE — CONTAINER SPEC STR 4OZ GRY LID

## (undated) DEVICE — GAMMEX® PI HYBRID SIZE 6.5, STERILE POWDER-FREE SURGICAL GLOVE, POLYISOPRENE AND NEOPRENE BLEND: Brand: GAMMEX

## (undated) DEVICE — CAUTERY BLADE 2IN INS E1455

## (undated) DEVICE — CASED DISP BIPOLAR CORD

## (undated) DEVICE — SUTURE PDS II 2-0 SH

## (undated) DEVICE — SOL LACT RINGERS 3000ML

## (undated) DEVICE — DRAPE HALF 40X58 DYNJP2410

## (undated) DEVICE — DRAIN RESERVOIR RELIAVAC 100CC

## (undated) DEVICE — APPLICATOR PREP 10.5ML ORNG CHG 2% ISO ALC

## (undated) DEVICE — MEDI-VAC NON-CONDUCTIVE SUCTION TUBING 6MM X 1.8M (6FT.) L: Brand: CARDINAL HEALTH

## (undated) DEVICE — TRAP MCS 40ML 5IN PLS SCR CAP

## (undated) DEVICE — DEVICE FAT TISSUE COLL REVOLVE

## (undated) DEVICE — ABDOMINAL PAD: Brand: CURITY

## (undated) DEVICE — SPONGE LAP 18X18 XRAY STRL

## (undated) DEVICE — ABDOMINAL BINDER: Brand: DEROYAL

## (undated) DEVICE — USE ITEM #176901

## (undated) DEVICE — Device: Brand: JELCO

## (undated) DEVICE — VEST SRG 3 MED CUP R/L

## (undated) DEVICE — Device: Brand: MICROAIRE®

## (undated) DEVICE — CHLORAPREP 26ML APPLICATOR

## (undated) DEVICE — BRA SURG ELIZ PINK M

## (undated) DEVICE — 9534HP TRANSPARENT DRSG W/FRAME: Brand: 3M™ TEGADERM™

## (undated) DEVICE — SUTURE ETHILON 3-0 669H

## (undated) DEVICE — NEEDLE SPNL 22GA L3.5IN PP ATRAUM TIP PENCAN

## (undated) DEVICE — Device: Brand: DEFENDO AIR/WATER/SUCTION AND BIOPSY VALVE

## (undated) DEVICE — PEN: MARKING STD PT 100/CS: Brand: MEDICAL ACTION INDUSTRIES

## (undated) DEVICE — CG INFILTRATION TUBING: Brand: CG INFILTRATION TUBING

## (undated) DEVICE — NON-ADHERENT PAD PREPACK: Brand: TELFA

## (undated) DEVICE — VIOLET BRAIDED (POLYGLACTIN 910), SYNTHETIC ABSORBABLE SUTURE: Brand: COATED VICRYL

## (undated) DEVICE — MICROSURGICAL INSTRUMENT 20GA SOFT TIP NEEDLE: Brand: ALCON GREISHABER

## (undated) DEVICE — CLIP RESOLUTION 235CM

## (undated) DEVICE — PLASTC TOOMEY SYRNG DISP

## (undated) DEVICE — ENCORE® PERRY STYLE 42 PF SIZE 6.5, STERILE LATEX POWDER-FREE SURGICAL GLOVE: Brand: ENCORE

## (undated) DEVICE — HEX-LOCKING BLADE ELECTRODE: Brand: EDGE

## (undated) DEVICE — CLIP SM INTNL HMCLP TI ESCP

## (undated) DEVICE — SPONGE LAP 4X18

## (undated) DEVICE — 6 ML SYRINGE LUER-LOCK TIP: Brand: MONOJECT

## (undated) DEVICE — MINOR GENERAL: Brand: MEDLINE INDUSTRIES, INC.

## (undated) DEVICE — PRESSURE TUBING: Brand: TRUWAVE

## (undated) DEVICE — 35 ML SYRINGE REGULAR TIP: Brand: MONOJECT

## (undated) DEVICE — SUTURE VICRYL 3-0 SH

## (undated) DEVICE — PLASTIC BREAST CDS-LF: Brand: MEDLINE INDUSTRIES, INC.

## (undated) DEVICE — SUTURE PROLENE 4-0 RB-1

## (undated) DEVICE — ENDOSCOPY PACK UPPER: Brand: MEDLINE INDUSTRIES, INC.

## (undated) DEVICE — SUTURE ETHILON 3-0 PS-2

## (undated) DEVICE — DRESSING FOAM TOPIFOAM

## (undated) DEVICE — SNARE OPTMZ PLPCTM TRP

## (undated) DEVICE — BLADE ELECTROSURG 4IN INSULATE

## (undated) DEVICE — MEDI-VAC YANKAUER SUCTION HANDLE W/BULBOUS TIP: Brand: CARDINAL HEALTH

## (undated) DEVICE — BULB SYRINGE,IRRIGATION WITH PROTECTIVE CAP: Brand: DOVER

## (undated) DEVICE — SOL .9 100ML

## (undated) DEVICE — SPONGE 4X4 10PK

## (undated) DEVICE — SYRINGE 10ML LL TIP

## (undated) DEVICE — 12 ML SYRINGE LUER-LOCK TIP: Brand: MONOJECT

## (undated) DEVICE — APPLIER LICACLIP MCA MED 23.8

## (undated) DEVICE — COVER PRB NEOGUARD 30X2.6CM US

## (undated) DEVICE — INSULATED BLADE ELECTRODE 6.5

## (undated) DEVICE — STERILE POLYISOPRENE POWDER-FREE SURGICAL GLOVES: Brand: PROTEXIS

## (undated) DEVICE — SYRINGE 30ML LL TIP

## (undated) DEVICE — 3M™ STERI-STRIP™ REINFORCED ADHESIVE SKIN CLOSURES, R1548, 1 IN X 5 IN (25 MM X 125 MM), 4 STRIPS/ENVELOPE: Brand: 3M™ STERI-STRIP™

## (undated) DEVICE — BBRAUN MEDICAL 21G X 4.4CM WINGED INF: Brand: B.BRAUN

## (undated) DEVICE — ASPIRATION TUBING SET, DISPOSABLE: Brand: MICROAIRE®

## (undated) DEVICE — SUTURE MONOCRYL 4-0 Y935H

## (undated) DEVICE — SUTURE SILK 2-0 FS

## (undated) DEVICE — SUTURE PDS II 3-0 Z683G

## (undated) DEVICE — KENDALL SCD EXPRESS SLEEVES, KNEE LENGTH, MEDIUM: Brand: KENDALL SCD

## (undated) DEVICE — 3M™ BAIR HUGGER® UNDERBODY BLANKET, FULL ACCESS, 10 PER CASE 63500: Brand: BAIR HUGGER™

## (undated) DEVICE — TOWEL,OR,DSP,ST,BLUE,DLX,2/PK,40PK/CS: Brand: MEDLINE

## (undated) DEVICE — SOL LACT RINGERS 1000ML

## (undated) DEVICE — NEEDLE BUTTERFLY 21GX1-3/4

## (undated) DEVICE — MARKER SKIN PREP RESIST STRL

## (undated) DEVICE — 3M(TM) TEGADERM(TM) TRANSPARENT FILM DRESSING FRAME STYLE 9505W: Brand: 3M™ TEGADERM™

## (undated) DEVICE — GAMMEX® PI HYBRID SIZE 7.5, STERILE POWDER-FREE SURGICAL GLOVE, POLYISOPRENE AND NEOPRENE BLEND: Brand: GAMMEX

## (undated) DEVICE — NEEDLE 18G 1-1/2 BLUNT FILL

## (undated) DEVICE — CLIP MED INTNL HMCLP TNTLM

## (undated) NOTE — LETTER
[address], [place], [place]    Authorization for Surgical Operation and Procedure                               I hereby authorize Geovanny Rivas MD, my physician and his/her assistants (if applicable), which may include medical students, residents, and/or fellows, to perform the following surgical operation/ procedure and administer such anesthesia as may be determined necessary by my physician: Operation/Procedure name (s) RIGHT L-4 TRANSFORAMINAL LUMBAR EPIDURAL STEROID INJECTION on Scar Cline   2.   I recognize that during the surgical operation/procedure, unforeseen conditions may necessitate additional or different procedures than those listed above.  I, therefore, further authorize and request that the above-named surgeon, assistants, or designees perform such procedures as are, in their judgment, necessary and desirable.    3.   My surgeon/physician has discussed prior to my surgery the potential benefits, risks and side effects of this procedure; the likelihood of achieving goals; and potential problems that might occur during recuperation.  They also discussed reasonable alternatives to the procedure, including risks, benefits, and side effects related to the alternatives and risks related to not receiving this procedure.  I have had all my questions answered and I acknowledge that no guarantee has been made as to the result that may be obtained.    4.   Should the need arise during my operation/procedure, which includes change of level of care prior to discharge, I also consent to the administration of blood and/or blood products.  Further, I understand that despite careful testing and screening of blood or blood products by collecting agencies, I may still be subject to ill effects as a result of receiving a blood transfusion and/or blood products.  The following are some, but not all, of the potential risks that can occur: fever and allergic reactions,  hemolytic reactions, transmission of diseases such as Hepatitis, AIDS and Cytomegalovirus (CMV) and fluid overload.  In the event that I wish to have an autologous transfusion of my own blood, or a directed donor transfusion, I will discuss this with my physician.  Check only if Refusing Blood or Blood Products  I understand refusal of blood or blood products as deemed necessary by my physician may have serious consequences to my condition to include possible death. I hereby assume responsibility for my refusal and release the hospital, its personnel, and my physicians from any responsibility for the consequences of my refusal.    o  Refuse   5.   I authorize the use of any specimen, organs, tissues, body parts or foreign objects that may be removed from my body during the operation/procedure for diagnosis, research or teaching purposes and their subsequent disposal by hospital authorities.  I also authorize the release of specimen test results and/or written reports to my treating physician on the hospital medical staff or other referring or consulting physicians involved in my care, at the discretion of the Pathologist or my treating physician.    6.   I consent to the photographing or videotaping of the operations or procedures to be performed, including appropriate portions of my body for medical, scientific, or educational purposes, provided my identity is not revealed by the pictures or by descriptive texts accompanying them.  If the procedure has been photographed/videotaped, the surgeon will obtain the original picture, image, videotape or CD.  The hospital will not be responsible for storage, release or maintenance of the picture, image, tape or CD.    7.   I consent to the presence of a  or observers in the operating room as deemed necessary by my physician or their designees.    8.   I recognize that in the event my procedure results in extended X-Ray/fluoroscopy time, I may develop a  skin reaction.    9. If I have a Do Not Attempt Resuscitation (DNAR) order in place, that status will be suspended while in the operating room, procedural suite, and during the recovery period unless otherwise explicitly stated by me (or a person authorized to consent on my behalf). The surgeon or my attending physician will determine when the applicable recovery period ends for purposes of reinstating the DNAR order.  10. Patients having a sterilization procedure: I understand that if the procedure is successful the results will be permanent and it will therefore be impossible for me to inseminate, conceive, or bear children.  I also understand that the procedure is intended to result in sterility, although the result has not been guaranteed.   11. I acknowledge that my physician has explained sedation/analgesia administration to me including the risk and benefits I consent to the administration of sedation/analgesia as may be necessary or desirable in the judgment of my physician.    I CERTIFY THAT I HAVE READ AND FULLY UNDERSTAND THE ABOVE CONSENT TO OPERATION and/or OTHER PROCEDURE.     ____________________________________  _________________________________        ______________________________  Signature of Patient    Signature of Responsible Person                Printed Name of Responsible Person                                      ____________________________________  _____________________________                ________________________________  Signature of Witness        Date  Time         Relationship to Patient    STATEMENT OF PHYSICIAN My signature below affirms that prior to the time of the procedure; I have explained to the patient and/or his/her legal representative, the risks and benefits involved in the proposed treatment and any reasonable alternative to the proposed treatment. I have also explained the risks and benefits involved in refusal of the proposed treatment and alternatives to the  proposed treatment and have answered the patient's questions. If I have a significant financial interest in a co-management agreement or a significant financial interest in any product or implant, or other significant relationship used in this procedure/surgery, I have disclosed this and had a discussion with my patient.     _____________________________________________________              _____________________________  (Signature of Physician)                                                                                         (Date)                                   (Time)  Patient Name: Scar Cline      : 1965      Printed: 2024     Medical Record #: [id number]                                      Page 1 of 1

## (undated) NOTE — LETTER
[address]  [facility] [address], [place], [place]  [address]  INFORMED CONSENT FOR TRANSFUSION OF BLOOD OR BLOOD PRODUCTS  My physician has informed me of the nature, purpose, benefits and risks of transfusion for blood and blood components that ______________________________________________  (Signature of Patient)                                                            (Responsible party in case of Minor,

## (undated) NOTE — LETTER
[address], [place], [place]     AUTHORIZATION FOR SURGICAL OPERATION OR PROCEDURE    I hereby authorize Dr. Johnny Riddle MD, my Physician(s) and whomever may be designated as the doctor's Assistant, to perform the f overload. In the event that I wish to have an autologous transfusion of my own blood, or a directed donor transfusion, I will discuss this with my Physician.   4. I consent to the photographing of procedure(s) to be performed for the purposes of advancing m (Responsible person in case of minor or unconscious patient)   (Relationship to Patient)    _______________________________________________________________ ____________________________  (Witness signature)

## (undated) NOTE — LETTER
Hospital Discharge Documentation  Please phone to schedule a hospital follow up appointment.     From: [address] Hospitalist's Office  Phone: [phone]    Patient discharged time/date: 12/24/2019  4:36 PM  Patient discharge disposition:  Home or [place] Malignant neoplasm of upper-outer quadrant of right breast in female, estrogen receptor positive ([facility]     Essential hypertension     Insulin dependent type 2 diabetes mellitus ([address]     Breast cancer of upper-outer quadrant of right female breast ([facility] with acellular dermal matrix [place])  PAIN CONTROL  MONITOR DRAINS  DVT PROPHYLAXIS  PATHOLOGY PENDING.      Low grade fever   Encourage IS    Essential hypertension  CONT HOME MEDS, MONITOR.      Insulin dependent type 2 diabetes mellitus (HCC)  ON INSULIN Take 1,000 Units by mouth daily. Refills:  0     CREON 78799-14093 units Cpep  Generic drug:  Pancrelipase (Lip-Prot-Amyl)      TAKE 3 CAPSULES BY MOUTH 3 (THREE) TIMES DAILY WITH MEALS.    Quantity:  810 capsule  Refills:  3     diazepam 2 MG Tabs  Comm

## (undated) NOTE — LETTER
[facility]  Administration of Anesthesia  1. Jennifer Gann, or _________________________________ acting on her behalf, (Patient) (Dependent/Representative) request to receive anesthesia for my pending procedure/operation/treatment. infections, high spinal block, spinal bleeding, seizure, cardiac arrest and death. 7. AWARENESS: I understand that it is possible (but unlikely) to have explicit memory of events from the operating room while under general anesthesia.   8. ELECTROCONVULSIV unconscious pt /Relationship    My signature below affirms that prior to the time of the procedure, I have explained to the patient and/or his/her guardian, the risks and benefits of undergoing anesthesia, as well as any reasonable alternatives.     _______

## (undated) NOTE — LETTER
[facility]  Administration of Anesthesia  1. Halford Mcburney, or _________________________________ acting on her behalf, (Patient) (Dependent/Representative) request to receive anesthesia for my pending procedure/operation/treatment. infections, high spinal block, spinal bleeding, seizure, cardiac arrest and death. 7. AWARENESS: I understand that it is possible (but unlikely) to have explicit memory of events from the operating room while under general anesthesia.   8. ELECTROCONVULSIV unconscious pt /Relationship    My signature below affirms that prior to the time of the procedure, I have explained to the patient and/or his/her guardian, the risks and benefits of undergoing anesthesia, as well as any reasonable alternatives.     _______

## (undated) NOTE — LETTER
[address] [place] [address] Rd, [place], [place]     AUTHORIZATION FOR SURGICAL OPERATION OR PROCEDURE    I hereby authorize Dr. Daryle Lion, MD, my Physician(s) and whomever may be designated as the doctor's Assistant, to perform the follo own blood, or a directed donor transfusion, I will discuss this with my Physician. 4. I consent to the photographing of procedure(s) to be performed for the purposes of advancing medicine, science and/or education, provided my identity is not revealed.  If _______________________________________________________________ ____________________________  (Witness signature)                                                                                                  (Date)                                (Time)

## (undated) NOTE — LETTER
Hospital Discharge Documentation  Please phone to schedule a hospital follow up appointment.    From: [facility] Hospitalist's Office  Phone: [phone]    Patient discharged time/date: 2024 12:37 PM  Patient discharge disposition:  Home or Self Care       Discharge Summary - D/C Summary        Discharge Summary signed by Héctor Tenorio MD at 2024 10:37 AM  Version 1 of 1      Author: Héctor Tenorio MD Service: Hospitalist Author Type: Physician    Filed: 2024 10:37 AM Date of Service: 2024  7:21 AM Status: Signed    : Héctor Tenorio MD (Physician)         [facility]  part of [facility]    Discharge Summary    Scar Cline Patient Status:  Inpatient    1965 MRN [id number]   Location [facility] 5SW/SE Attending Héctor Tenorio MD   Hosp Day # 2 PCP Derian Benjamin MD     Date of Admission: 2024 Disposition: Home or Self Care     Date of Discharge: 24      Admitting Diagnosis: Hyponatremia [E87.1]    Hospital Discharge Diagnoses:  Hyponatremia    Lace+ Score: 73  59-90 High Risk  29-58 Medium Risk  0-28   Low Risk.    TCM Follow-Up Recommendation:  LACE > 58: High Risk of readmission after discharge from the hospital.      Problem List:   Patient Active Problem List   Diagnosis    Controlled type 2 diabetes mellitus without complication, without long-term current use of insulin (HCC)    Myopia    Viral warts    Chest pain, atypical    Hypokalemia    Metabolic alkalosis    Dyspnea on exertion    Perioral cyanosis    Acute chest pain    Elevated immune protein in blood    Muscular pain    Chronic bilateral thoracic back pain    Neutrophilic leukocytosis    Thrombocytosis    Abnormal mammogram of right breast    Malignant neoplasm of upper-outer quadrant of right breast in female, estrogen receptor positive (HCC)    Essential hypertension    Insulin dependent type 2 diabetes mellitus (HCC)    Absence of breast, bilateral    Absence  of both breasts    Breast abscess    Dehydration    Anorexia    COVID-19    Bleeding from wound    Breast hematoma    Hyponatremia    Constipation, unspecified constipation type    Abdominal pain, acute    Cervicalgia    Myofascial pain    Snoring    Insomnia    Hyperglycemia    Generalized anxiety disorder       Reason for Admission:   Hyponatremia  Confusion  Type 1 diabetes mellitus  HTN  History of breast CA  History of Hodgkin's lymphoma  Pancreatic insufficiency  Gastroparesis     Physical Exam:   General appearance: alert, appears stated age and cooperative  Pulmonary:  clear to auscultation bilaterally  Cardiovascular: S1, S2 normal, no murmur, click, rub or gallop, regular rate and rhythm  Abdominal: soft, non-tender; bowel sounds normal; no masses,  no organomegaly  Extremities: extremities normal, atraumatic, no cyanosis or edema  Psychiatric: calm      History of Present Illness:   Per Dr. Dumont  This is a 58 year oldfemale who was sent in for evaluation of generalized weakness and feeling unsteady.  Patient states symptoms have been progressively worsening over the past week or so.  Patient went to see her primary care physician who ordered blood work.  Patient was found to have low sodium and recommended to present to the ED for further evaluation.  At time of interview, patient reported still feeling generally weak.  Stated she was attempting to work out earlier in the day and was feeling unsteady as if she was going to fall prompting her to stop and rest.  Upon further review, patient stated she was started on trileptal about 3 weeks prior.  Patient otherwise denies chest pain, shortness of breath, abdominal pain, nausea vomiting, fevers or chills.     Hospital Course:   Hyponatremia  -Patient presenting with mild confusion, lower extremity edema  -Noted to have sodium levels of 118.  -Previous episode of hyponatremia attributed to hydrochlorothiazide and Depakote  -Nephrology consulted,  appreciate recommendations, sodium improved to 124 but back down to 121-->127-->130  -will d/w Dr. Pérez  -started ivf  -will d/w nephrology, plan home later today  -plan fluid restriction and OP labs     Confusion  Acute Metabolic encephalopathy  -related hyponatremia  -improved     Type 1 diabetes mellitus  -Secondary to pancreatic insufficiency  -Has home insulin pump, while hospitalized sliding scale insulin given recent lower blood sugars  -d/w dheanna, given hba1c 5.8,  -Endocrinology on consult, appreciate recommendations  -ssi       HTN  -Uncontrolled in the ER likely situational now improved appreciate cardiology evaluation  - controlled  -cont lisinopril  - Monitor and adjust as needed     Other problems  History of breast CA  History of Hodgkin's lymphoma  Pancreatic insufficiency  Gastroparesis     Consultations:   Nephrology  Cardiology  Endocrinology    Procedures: n/a    Complications: n/a    Discharge Condition: Good    Discharge Medications:      Discharge Medications        CONTINUE taking these medications        Instructions Prescription details   acetaminophen 500 MG Tabs  Commonly known as: Tylenol Extra Strength      Take 2 tablets (1,000 mg total) by mouth 2 (two) times daily as needed for Pain.   Refills: 0     dilTIAZem HCl ER Coated Beads 180 MG Cp24  Commonly known as: Cartia XT      Take 1 capsule (180 mg total) by mouth daily.   Quantity: 60 capsule  Refills: 3     ibuprofen 800 MG Tabs  Commonly known as: Motrin      Take 1 tablet (800 mg total) by mouth 2 (two) times daily as needed for Pain.   Refills: 0     insulin glargine 100 UNIT/ML Soln  Commonly known as: Lantus      Inject into the skin as needed.   Refills: 0     lisinopril 10 MG Tabs  Commonly known as: Zestril      Take 1 tablet (10 mg total) by mouth in the morning and 1 tablet (10 mg total) before bedtime.   Refills: 0     omeprazole 20 MG Cpdr  Commonly known as: PriLOSEC      Take 1 capsule (20 mg total) by mouth 2 (two)  times daily before meals.   Refills: 0            STOP taking these medications      Depakote  MG Tb24  Generic drug: divalproex ER        furosemide 20 MG Tabs  Commonly known as: Lasix                 Follow up Visits: Follow-up with pcp; in 1 week    Follow up Labs: bmp     Other Discharge Instructions: follow-up with nephrology as instructed    NAV MADRID MD  4/8/2024  7:21 AM    > 35 min       Electronically signed by Nav Madrid MD on 4/8/2024 10:37 AM

## (undated) NOTE — LETTER
Hospital Discharge Documentation  Please phone to schedule a hospital follow up appointment.     From: [address] Hospitalist's Office  Phone: [phone]    Patient discharged time/date: 5/2/2021  2:40 PM  Patient discharge disposition:  Home or Self hyponatremia and hypokalemia which were attributed to poor p.o. intake from her abdominal pain. Her electrolytes corrected with IV fluids and potassium replacement.   Patient was cleared for discharge by GI with recommendations to follow-up with her normal pancreas, similar to prior. 5. Lesser incidental findings as above.      Dictated by (CST): Serafin Barksdale MD on 4/30/2021 at 11:59 AM     Finalized by (CST): Serafin Barksdale MD on 4/30/2021 at 12:15 PM            LABS :     Lab Results   Component Value Date Refills: 0        CHANGE how you take these medications      Instructions Prescription details   dilTIAZem HCl ER Coated Beads 180 MG Cp24  Commonly known as: Marlen VAZQUEZ  What changed:   · how much to take  · when to take this      Take 1 capsule (180 mg t were sent to [address] [facility] [address], [phone] [address] [facility] [place], [address] [place] [address]    Phone: [phone]   · dilTIAZem HCl ER Coated Beads 180 MG Cp24  · sennosides 8.6 MG Tabs         Follow up Visits  No follow-up p

## (undated) NOTE — LETTER
[address], [place]  Authorization for Invasive Procedures  1.  I hereby authorize Dr. Collin Nugent , my physician and whomever may be designated as the doctor's assistant, to perform the following operation and/or procedure:  Gabriel Espana performed for the purposes of advancing medicine, science, and/or education, provided my identity is not revealed. If the procedure has been videotaped, the physician/surgeon will obtain the original videotape.  The hospital will not be responsible for stor My signature below affirms that prior to the time of the procedure, I have explained to the patient and/or her legal representative, the risks and benefits involved in the proposed treatment and any reasonable alternative to the proposed treatment.  I have

## (undated) NOTE — LETTER
Hospital Discharge Documentation  Please phone to schedule a hospital follow up appointment.     From: [address] Hospitalist's Office  Phone: [phone]    Patient discharged time/date: 3/5/2020 11:30 AM  Patient discharge disposition:  Home or Self recommended this could possibly be a drug reaction. The patient at this time will be discharged. Currently, she has been afebrile, normotensive, saturating well on room air. She has been deemed stable to be discharged home on Bactrim.   She will follow u

## (undated) NOTE — LETTER
[address], [facility]   Date:   8/16/2022     Name:   Gilma Thompson    YOB: 1965   MRN:   [id number]       WHERE IS YOUR PAIN NOW? Ryan the areas on your body where you feel the described sensations. Use the appropriate symbol. Kelley Kutrz the areas of radiation. Include all affected areas. Just to complete the picture, please draw in the face. ACHE:  ^ ^ ^   NUMBNESS:  0000   PINS & NEEDLES:  = = = =                              ^ ^ ^                       0000              = = = =                                    ^ ^ ^                       0000            = = = =      BURNING:  XXXX   STABBING: ////                  XXXX                ////                         XXXX          ////     Please ryan the line below indicating your degree of pain right now  with 0 being no pain 10 being the worst pain possible.                                          0             1             2              3             4              5              6              7             8             9             10         Patient Signature:

## (undated) NOTE — LETTER
[facility], [place]  Authorization for Invasive Procedures  1.  I hereby authorize Dr. Jeanne Phillips, my physician and whomever may be designated as the doctor's assistant, to perform the following operation and/or procedure:  Esop performed for the purposes of advancing medicine, science, and/or education, provided my identity is not revealed. If the procedure has been videotaped, the physician/surgeon will obtain the original videotape.  The hospital will not be responsible for stor My signature below affirms that prior to the time of the procedure, I have explained to the patient and/or her legal representative, the risks and benefits involved in the proposed treatment and any reasonable alternative to the proposed treatment.  I have

## (undated) NOTE — LETTER
Hospital Discharge Documentation  Please phone to schedule a hospital follow up appointment.     From: [address] Hospitalist's Office  Phone: [phone]    Patient discharged time/date: 6/19/2020 11:59 AM  Patient discharge disposition:  Home or Self Insulin dependent type 2 diabetes mellitus (HCC)     Absence of breast, bilateral     Absence of both breasts     Breast abscess     Dehydration     Anorexia     COVID-19     Bleeding from wound     Breast hematoma[FG.2]      Reason for Admission:[FG.1] What changed:    · how much to take  · how to take this  · when to take this     * docusate sodium 100 MG Caps  Commonly known as:  COLACE  Take 1 capsule (100 mg total) by mouth 2 (two) times daily as needed for constipation.   What changed:  Another medic What changed:  Another medication with the same name was added. Make sure you understand how and when to take each. * Sulfamethoxazole-TMP -160 MG Tabs per tablet  Commonly known as:   Bactrim DS  Take 1 tablet by mouth 2 (two) times daily for 10 Follow up Visits:  Follow-up with[FG.1] pcp as needed[FG.3]    Follow up Labs:[FG.1] n/a[FG.3]     Other Discharge Instructions:[FG.1] f/u with Dr. Migel Zarate MD  6/19/2020  11:22 AM    > 35 min[FG.1]       Electronically signed by Jared Sanchez

## (undated) NOTE — LETTER
Hospital Discharge Documentation  Please phone to schedule a hospital follow up appointment.     From: [address] Hospitalist's Office  Phone: [phone]    Patient discharged time/date: 4/8/2020  3:59 PM  Patient discharge disposition:  Home or Self Insulin dependent type 2 diabetes mellitus (HCC)     Absence of breast, bilateral     Absence of both breasts     Breast abscess     Dehydration     Anorexia     COVID-19      Reason for Admission:   Dehydration      Physical Exam:   General appearance: Continue quetiapine      Nausea  -improved     Consultations:   GI  Endocrine    Procedures:[FG.1] C/L[EX.1]    Complications:[FG.1] n/a[FG. 3]    Discharge Condition: Good    Discharge Medications:[FG.1]      Medication List      START taking these medicat Follow up Visits:  Follow-up with[FG.1] pcp as needed[FG.3]    Follow up Labs:[FG.1] n/a[FG.3]     Other Discharge Instructions:[FG.1] n/a[FG.3]    Fermin Marcano MD  4/8/2020  2:33 PM    > 35 min[FG.1]       Electronically signed by Darius Hendricks MD on

## (undated) NOTE — LETTER
[address] [place] [address]  Authorization for Invasive Procedures  1.  I hereby authorize  Dr. Mohit Flores  my physician and whomever may be designated as the doctor's assistant, to perform the following operation and/or procedure: Torsten Foster performed for the purposes of advancing medicine, science, and/or education, provided my identity is not revealed. If the procedure has been videotaped, the physician/surgeon will obtain the original videotape.  The hospital will not be responsible for stor My signature below affirms that prior to the time of the procedure, I have explained to the patient and/or her legal representative, the risks and benefits involved in the proposed treatment and any reasonable alternative to the proposed treatment.  I have

## (undated) NOTE — LETTER
Hospital Discharge Documentation  Please phone to schedule a hospital follow up appointment.    From: [facility] Hospitalist's Office  Phone: [phone]    Patient discharged time/date: 2023  1:22 PM  Patient discharge disposition:  Home or Self Care       Discharge Summary - D/C Summary        Discharge Summary signed by Héctor Tenorio MD at 2023 12:15 PM  Version 1 of 1      Author: Héctor Tenorio MD Service: Hospitalist Author Type: Physician    Filed: 2023 12:15 PM Date of Service: 2023 11:41 AM Status: Signed    : Héctor Tenorio MD (Physician)         [facility]    Discharge Summary    Scar Cline Patient Status:  Inpatient    1965 MRN [id number]   Location [facility] 1W Attending Héctor Tenorio MD   Hosp Day # 1 PCP Derian Benjamin MD     Date of Admission: 2023 Disposition: Home or Self Care     Date of Discharge: 23     Admitting Diagnosis: Hyponatremia [E87.1]    Hospital Discharge Diagnoses:  Hyponatremia    Lace+ Score: 57  59-90 High Risk  29-58 Medium Risk  0-28   Low Risk.    TCM Follow-Up Recommendation:  LACE < 29: Low Risk of readmission after discharge from the hospital. No TCM follow-up needed.      Problem List:   Patient Active Problem List   Diagnosis    Controlled type 2 diabetes mellitus without complication, without long-term current use of insulin (HCC)    Myopia    Viral warts    Chest pain, atypical    Hypokalemia    Metabolic alkalosis    Dyspnea on exertion    Perioral cyanosis    Acute chest pain    Elevated immune protein in blood    Muscular pain    Chronic bilateral thoracic back pain    Neutrophilic leukocytosis    Thrombocytosis    Abnormal mammogram of right breast    Malignant neoplasm of upper-outer quadrant of right breast in female, estrogen receptor positive  (HCC)    Essential hypertension    Insulin dependent type 2 diabetes mellitus (HCC)    Absence of breast, bilateral     Absence of both breasts    Breast abscess    Dehydration    Anorexia    COVID-19    Bleeding from wound    Breast hematoma    Hyponatremia    Constipation, unspecified constipation type    Abdominal pain, acute    Cervicalgia    Myofascial pain    Snoring    Insomnia       Reason for Admission:   Hyponatremia  Type 1 diabetes mellitus  HTN  History of breast CA  History of Hodgkin's lymphoma  Pancreatic insufficiency  Gastroparesis    Physical Exam:   General appearance: alert, appears stated age and cooperative  Pulmonary:  clear to auscultation bilaterally  Cardiovascular: S1, S2 normal, no murmur, click, rub or gallop, regular rate and rhythm  Abdominal: soft, non-tender; bowel sounds normal; no masses,  no organomegaly  Extremities: extremities normal, atraumatic, no cyanosis or edema  Psychiatric: calm      History of Present Illness:   Per Dr. Dumont  This is a 58 year oldfemale who is sent in by family after finding abnormal laboratory testing as outpatient and having concerns for continued decline and change in mental status.  Patient describes worse lower extremity swelling for the past 5 days or so.  She states she has been taking increased doses of hydrochlorothiazide for the past 3 days to combat this edema.  Patient had laboratory values drawn as outpatient which noted low sodium levels prompting visit to ED.  At time of interview, patient denied shortness of breath, chest pain, abdominal pain, nausea or vomiting.  Patient describes some generalized feeling of unwell, but unable to tolerate further.  Patient denies subjective fevers or chills or recent sick contacts.     Hospital Course:    Hyponatremia  -Patient presenting with mild confusion, lower extremity edema  -Noted to have sodium levels of 124.  -Patient reported recent use of hydrochlorothiazide and increased dosing of Depakote.  -stopping hydrochlorothiazide and Depakote  -Nephrology consulted, appreciate recommendations, sodium improved  to 130  -repeat labs in am and follow-up with Dr. Carlos in 1 week    Confusion  -improved  -likely related to elevated depakote level (106) and hyponatremia  -CT scan with ch. Microvascular disease and mild atrophy  -d/w family mental status improved, recomment, follow-up with psychiatry for depakote dosing, follow-up and monitor sodium, minimize episodes of hypoglycemia.  -if still with concern for confusion once above factors improved in ~3 months then would pursue further w/u which would include mri brain and      Type 1 diabetes mellitus  -Secondary to pancreatic insufficiency  - resume home insulin pump  -d/w dheanna, given hba1c 6, and episodes of hypoglycemia, would recommend liberalizing her diet  -Endocrinology on consult, appreciate recommendations     HTN  - controlled  -Avoiding hydrochlorothiazide due to hyponatremia as above  -Restart lisinopril  - Monitor and adjust as needed     Other problems  History of breast CA  History of Hodgkin's lymphoma  Pancreatic insufficiency  Gastroparesis     Consultations:   Cardiology  Nephrology  Endocrinology    Procedures: n/a    Complications: n/a    Discharge Condition: Good    Discharge Medications:      Discharge Medications        CONTINUE taking these medications        Instructions Prescription details   acetaminophen 500 MG Tabs  Commonly known as: Tylenol Extra Strength      Take 2 tablets (1,000 mg total) by mouth 2 (two) times daily as needed for Pain.   Refills: 0     Depakote  MG Tb24  Generic drug: divalproex ER      Take 1 tablet (500 mg total) by mouth nightly as needed.   Refills: 0     dilTIAZem HCl ER Coated Beads 180 MG Cp24  Commonly known as: Cartia XT      Take 1 capsule (180 mg total) by mouth daily.   Quantity: 60 capsule  Refills: 3     ibuprofen 800 MG Tabs  Commonly known as: Motrin      Take 1 tablet (800 mg total) by mouth 2 (two) times daily as needed for Pain.   Refills: 0     insulin glargine 100 UNIT/ML Soln  Commonly known  as: Lantus      Inject into the skin as needed.   Refills: 0     lisinopril 10 MG Tabs  Commonly known as: Zestril      Take 1 tablet (10 mg total) by mouth in the morning and 1 tablet (10 mg total) before bedtime.   Refills: 0     omeprazole 20 MG Cpdr  Commonly known as: PriLOSEC      Take 1 capsule (20 mg total) by mouth 2 (two) times daily before meals.   Refills: 0              Follow up Visits: Follow-up with PCP in 1 week    Follow up Labs: BMP     Other Discharge Instructions: FOLLOW-UP WITH DR. NEFTALI MADRID MD  12/31/2023  11:41 AM    > 35 min       Electronically signed by Héctor Madrid MD on 12/31/2023 12:15 PM

## (undated) NOTE — LETTER
Hospital Discharge Documentation  Please phone to schedule a hospital follow up appointment. No discharge summary available at this time, below is the most recent progress note  for your review .         From: [address] Hospitalist's Office  Phone: [phone] • Type 1 diabetes mellitus ([facility] [address]       Type 1-C            Past Surgical History:   Procedure Laterality Date   • APPENDECTOMY   1992   • CHEMOTHERAPY   1984     Hodgkin;s disease-3B   • COLONOSCOPY   3/21/2014   • HIP SURGERY   2013     hip pinning for st Facility-Administered Medications: None         Review of Systems:  Constitutional:  Weakness, Fatigue. Eye:  Negative. Ear/Nose/Mouth/Throat:  Negative. Respiratory:  Negative  Cardiovascular: Chest pain  Gastrointestinal:  Negative.   Genitourinary:  N Chest pain probable ACS  First set of troponin negative, EKG with no acute changes, if troponin remains negative will get stress test in a.m. to rule out angina.   Cardiology has been consulted.     Benign hypertension  Blood pressure well controlled at [facility]

## (undated) NOTE — LETTER
[address], [place], [place]     AUTHORIZATION FOR SURGICAL OPERATION OR PROCEDURE    I hereby authorize Dr. Jovno Morales MD, my Physician(s) and whomever may be designated as the doctor's Assistant, to perform the foll own blood, or a directed donor transfusion, I will discuss this with my Physician. 4. I consent to the photographing of procedure(s) to be performed for the purposes of advancing medicine, science and/or education, provided my identity is not revealed.  If _______________________________________________________________ ____________________________  (Witness signature)                                                                                                  (Date)                                (Time)

## (undated) NOTE — LETTER
3/30/2023    Scar Cline        [address] [facility] [address]        [place] [address]            Dear Shashi Arteaga,      Our records indicate that you are due for an appointment for a Colonoscopy with Kathleen Hernadez MD. Our doctors are booking out about 3-5 months in advance for procedures. Please call our office to schedule a phone screening appointment to plan for the procedure(s). Your medical well-being is important to us. If your insurance requires a referral, please call your primary care office to request one.       Thank you,      The Physicians and Staff at [facility]

## (undated) NOTE — LETTER
Hospital Discharge Documentation  Please phone to schedule a hospital follow up appointment. No discharge summary available at this time, below is the most recent progress note  for your review .       From: [id number] [facility] Office  Phone: [phone]   K 4.2 04/08/2019     K 4.2 04/08/2019      04/08/2019     CO2 28.0 04/08/2019      04/08/2019     CA 10.1 04/08/2019     TROP <0.045 04/08/2019      04/07/2019            Imaging: Reviewed     Xr Chest Ap Portable  (cpt=71045)     Resu Subcutaneous 2 times per day   acetaminophen (TYLENOL) tab 650 mg 650 mg Oral Q6H PRN   ondansetron HCl (ZOFRAN) injection 4 mg 4 mg Intravenous Q6H PRN   Metoclopramide HCl (REGLAN) injection 10 mg 10 mg Intravenous Q8H PRN   Metoprolol Tartrate (LOPRESSO Patient Active Problem List:     Controlled type 2 diabetes mellitus without complication, without long-term current use of insulin ([facility] [address]     Myopia     Viral warts     Chest pain, atypical     Hypokalemia     Metabolic alkalosis     Dyspnea on exertion